# Patient Record
Sex: FEMALE | Race: BLACK OR AFRICAN AMERICAN | NOT HISPANIC OR LATINO | Employment: OTHER | URBAN - METROPOLITAN AREA
[De-identification: names, ages, dates, MRNs, and addresses within clinical notes are randomized per-mention and may not be internally consistent; named-entity substitution may affect disease eponyms.]

---

## 2018-10-08 ENCOUNTER — TELEPHONE (OUTPATIENT)
Dept: CARDIOLOGY | Facility: CLINIC | Age: 63
End: 2018-10-08

## 2018-10-09 ENCOUNTER — TELEPHONE (OUTPATIENT)
Dept: CARDIOLOGY | Facility: CLINIC | Age: 63
End: 2018-10-09

## 2018-10-09 NOTE — TELEPHONE ENCOUNTER
Spoke with patient reference to referral jennifer with Dr Redding. Made appointment for 01/17/19 with Dr Redding. No other issues were discussed.

## 2018-10-18 ENCOUNTER — TELEPHONE (OUTPATIENT)
Dept: ORTHOPEDICS | Facility: CLINIC | Age: 63
End: 2018-10-18

## 2018-10-18 DIAGNOSIS — M25.552 PAIN OF BOTH HIP JOINTS: Primary | ICD-10-CM

## 2018-10-18 DIAGNOSIS — M25.551 PAIN OF BOTH HIP JOINTS: Primary | ICD-10-CM

## 2018-10-18 NOTE — TELEPHONE ENCOUNTER
Referral discussed with MD due to concerns January may be to long to wait. Per MD patient needs to be fit it sooner. Appointment made for 10/25 @10.15. Advised patient to come for about 9 to complete imaging prior. Asked patient if she could obtain  The MRI disk too. Patient agreeable and no other issues were discussed.

## 2018-10-25 ENCOUNTER — INITIAL CONSULT (OUTPATIENT)
Dept: ORTHOPEDICS | Facility: CLINIC | Age: 63
End: 2018-10-25
Payer: MEDICAID

## 2018-10-25 VITALS
HEIGHT: 62 IN | HEART RATE: 71 BPM | SYSTOLIC BLOOD PRESSURE: 123 MMHG | DIASTOLIC BLOOD PRESSURE: 70 MMHG | BODY MASS INDEX: 36.99 KG/M2 | WEIGHT: 201 LBS

## 2018-10-25 DIAGNOSIS — M87.051 AVASCULAR NECROSIS OF RIGHT FEMUR: Primary | ICD-10-CM

## 2018-10-25 PROCEDURE — 99204 OFFICE O/P NEW MOD 45 MIN: CPT | Mod: S$PBB,,, | Performed by: ORTHOPAEDIC SURGERY

## 2018-10-25 PROCEDURE — 99214 OFFICE O/P EST MOD 30 MIN: CPT | Mod: PBBFAC,PN | Performed by: ORTHOPAEDIC SURGERY

## 2018-10-25 PROCEDURE — 99999 PR PBB SHADOW E&M-EST. PATIENT-LVL IV: CPT | Mod: PBBFAC,,, | Performed by: ORTHOPAEDIC SURGERY

## 2018-10-25 RX ORDER — LEVOTHYROXINE SODIUM 112 UG/1
112 TABLET ORAL DAILY
Refills: 0 | COMMUNITY
Start: 2018-08-28 | End: 2021-10-26 | Stop reason: SDUPTHER

## 2018-10-25 NOTE — LETTER
October 25, 2018      Merna Lofton, JOSEPH  [address] [address]           [facility]  Orthopedics  [address], [facility] [address]  [facility] [address]  Phone: [phone]  Fax: [phone]          Patient: Carolee Bartlett   MR Number: [id number]   YOB: 1955   Date of Visit: 10/25/2018       Dear Merna Lofton:    Thank you for referring Carolee Bartlett to me for evaluation. Attached you will find relevant portions of my assessment and plan of care.    If you have questions, please do not hesitate to call me. I look forward to following Carolee Bartlett along with you.    Sincerely,    Olayinka Redding MD    Enclosure  CC:  No Recipients    If you would like to receive this communication electronically, please contact [email or web address] or [phone] to request more information on [employer] access.    For providers and/or their staff who would like to refer a patient to [facility], please contact us through our one-stop-shop provider referral line, [facility], at [phone].    If you feel you have received this communication in error or would no longer like to receive these types of communications, please e-mail [email or web address]

## 2018-10-25 NOTE — PROGRESS NOTES
Subjective:      Patient ID: Carolee Bartlett is a 63 y.o. female.    Chief Complaint: Pain and Other of the Right Hip    Carolee Bartlett is a 63 year old female here with a 1 year history of right hip pain. The patient is a  individual, not currently working. There was not a history of trauma.  The pain is severe The pain is located in the groin.  There is is radiation.  The pain radaites to the knee.  The pain is described as sharp. The patient has not had prior surgery. It is aggravated by sitting, standing, lying, at rest, walking, upon awakening and with moderate activity.  It  is not alleviated by rest. There is not numbness or tingling of the lower extremity.  There is not back pain.  She  has tried medication. They have not helped.  She does have difficulty getting in or out of a car, getting dressed, or going up or down stairs.  The patient does use an assistive device.          Review of Systems   Constitution: Negative for chills and fever.   Cardiovascular: Negative for chest pain and syncope.   Respiratory: Negative for cough and shortness of breath.    Musculoskeletal: Positive for joint pain.   Gastrointestinal: Negative for nausea and vomiting.   Neurological: Negative for brief paralysis and seizures.   Psychiatric/Behavioral: Negative for altered mental status and hallucinations.         Objective:            General    Constitutional: She is oriented to person, place, and time. She appears well-developed and well-nourished.   HENT:   Head: Normocephalic and atraumatic.   Eyes: Conjunctivae are normal.   Neck: Normal range of motion.   Cardiovascular: Intact distal pulses.    Pulmonary/Chest: Effort normal.   Neurological: She is alert and oriented to person, place, and time.   Psychiatric: She has a normal mood and affect. Her behavior is normal. Judgment and thought content normal.     General Musculoskeletal Exam   Gait: antalgic   Pelvic Obliquity: none        Right Hip Exam     Inspection    Scars: absent  Swelling: absent  Bruising: absent  No deformity of hip.  Quadriceps Atrophy:  Negative  Erythema: absent    Range of Motion   Abduction: 30   Adduction: 20   Extension: 0   Flexion: 90   External rotation: 30   Internal rotation: 10     Tests   Pain w/ forced internal rotation (ADILENE): present  Pain w/ forced external rotation (FADIR): present  Stinchfield test: positive  Log Roll: positive    Other   Sensation: normal  Back (L-Spine & T-Spine) / Neck (C-Spine) Exam     Back (L-Spine & T-Spine) Range of Motion   The patient has abnormal back ROM.      Muscle Strength   Right Lower Extremity   Hip Abduction: 5/5   Hip Adduction: 5/5   Hip Flexion: 5/5   Ankle Dorsiflexion:  5/5     Vascular Exam     Right Pulses  Dorsalis Pedis:      2+  Posterior Tibial:      2+        Capillary Refill  Right Hand: normal capillary refill    Radiographs of the Right hip demonstrate no fracture/dislocation. There is collapse of the femoral head consistent with AVN.               Assessment:       Encounter Diagnosis   Name Primary?    Avascular necrosis of right femur Yes          Plan:       Carolee was seen today for pain and other.    Diagnoses and all orders for this visit:    Avascular necrosis of right femur  -     Case Request Operating Room: ARTHROPLASTY, HIP, [place]iel, peg board, first assist      64yo F with RIGHT hip AVN    Will proceed to OR for RIGHT BILLY  Consent  Medical clearance

## 2018-11-19 ENCOUNTER — TELEPHONE (OUTPATIENT)
Dept: CARDIOLOGY | Facility: CLINIC | Age: 63
End: 2018-11-19

## 2018-11-19 NOTE — TELEPHONE ENCOUNTER
Placed return call to patient to answer question about surgery. Patient wanted to know what time is her surgery for. Patient was advised she will be getting a call from the surgery department to inform on the time and give Pre - Op  Instructions for surgery. No further issues were discussed.

## 2018-11-19 NOTE — TELEPHONE ENCOUNTER
----- Message from Jil Collins sent at 11/19/2018 11:28 AM CST -----  Contact: self   Patient has some questions regarding her upcoming surgery. Please call back at [phone] (home)

## 2018-11-26 PROBLEM — Z96.649 S/P TOTAL HIP ARTHROPLASTY: Status: ACTIVE | Noted: 2018-11-26

## 2018-11-26 PROBLEM — E03.9 HYPOTHYROIDISM: Status: ACTIVE | Noted: 2018-11-26

## 2018-11-26 PROBLEM — M87.051 AVASCULAR NECROSIS OF RIGHT FEMUR: Status: ACTIVE | Noted: 2018-11-26

## 2018-12-03 ENCOUNTER — TELEPHONE (OUTPATIENT)
Dept: ORTHOPEDICS | Facility: CLINIC | Age: 63
End: 2018-12-03

## 2018-12-03 NOTE — TELEPHONE ENCOUNTER
Spoke with patient regarding her symptoms. Patient stated she is experiencing swelling to both lower extremities. Denies redness, warmth, nausea/vomiting, fever, SOB. Informed patient that swelling is to be expected but she should be observant of the aforementioned symptoms and call if she should notice any changes. Patient indicated understanding of the information provided to her. No further issues discussed.

## 2018-12-03 NOTE — TELEPHONE ENCOUNTER
----- Message from Camden Lucero sent at 12/3/2018  8:58 AM CST -----  Contact: self   Patient want to inform office she have swelling on lower body need some medical advice please call back at [phone] (home)

## 2018-12-07 ENCOUNTER — TELEPHONE (OUTPATIENT)
Dept: SURGERY | Facility: CLINIC | Age: 63
End: 2018-12-07

## 2018-12-07 NOTE — TELEPHONE ENCOUNTER
Received call from patient inquiring as to which  is responsible is responsible for changing the post surgical dressing.Patient stated there is an appointment with the Primary Care Physician before the appointment with the Postop  physician.Patient Acknowledge the Postop dressings remains clean and intact.Patient was asked was Post-Op dressing change instructions give and discussed upon discharge. Patient acknowledges understanding of Postop discharge dressing change instructions. Patient was advised to follow Postop discharge dressing instructions as meticulously as reasonably possible. Patient was further advised to go the emergency room as expeditiously as possible, upon a problematic concern about the Postop dressings.Patient was further advised upon visitation with the Primary Care Physician,should there be concerns about the status of the Postop surgical dressing, it should be addressed with the Primary Care Physician. Patient acknowledges understanding of instructions, . No further issues were addressed.

## 2018-12-20 ENCOUNTER — OFFICE VISIT (OUTPATIENT)
Dept: ORTHOPEDICS | Facility: CLINIC | Age: 63
End: 2018-12-20
Payer: MEDICAID

## 2018-12-20 VITALS
SYSTOLIC BLOOD PRESSURE: 133 MMHG | DIASTOLIC BLOOD PRESSURE: 70 MMHG | HEART RATE: 98 BPM | WEIGHT: 206.38 LBS | HEIGHT: 62 IN | BODY MASS INDEX: 37.98 KG/M2

## 2018-12-20 DIAGNOSIS — Z51.89 AFTERCARE: Primary | ICD-10-CM

## 2018-12-20 PROCEDURE — 99213 OFFICE O/P EST LOW 20 MIN: CPT | Mod: PBBFAC,PN | Performed by: ORTHOPAEDIC SURGERY

## 2018-12-20 PROCEDURE — 99024 POSTOP FOLLOW-UP VISIT: CPT | Mod: ,,, | Performed by: ORTHOPAEDIC SURGERY

## 2018-12-20 PROCEDURE — 99999 PR PBB SHADOW E&M-EST. PATIENT-LVL III: CPT | Mod: PBBFAC,,, | Performed by: ORTHOPAEDIC SURGERY

## 2018-12-20 RX ORDER — OXYCODONE AND ACETAMINOPHEN 5; 325 MG/1; MG/1
1-2 TABLET ORAL EVERY 6 HOURS PRN
Qty: 91 TABLET | Refills: 0 | Status: SHIPPED | OUTPATIENT
Start: 2018-12-20 | End: 2019-03-14

## 2018-12-20 NOTE — PROGRESS NOTES
Patient presents 2 weeks s/p Right BILLY  Pain controlled on current regimen  No fever/chills  Compliant with restrictions      Sutures intact  Wound c/d/i  No si/sx of infection  NVI distally      2 weeks s/p Right BILLY  Pain rx  PT referral  Activity as tolerated  RTC 4 weeks

## 2019-01-09 ENCOUNTER — TELEPHONE (OUTPATIENT)
Dept: ORTHOPEDICS | Facility: CLINIC | Age: 64
End: 2019-01-09

## 2019-01-09 DIAGNOSIS — M25.551 HIP PAIN, RIGHT: Primary | ICD-10-CM

## 2019-01-09 NOTE — TELEPHONE ENCOUNTER
Patient coming for 6 week post-op appointment. Patient advised that new images would need to be completed prior. Patient verbalized understanding and no other issues were discussed.

## 2019-01-11 ENCOUNTER — APPOINTMENT (OUTPATIENT)
Dept: RADIOLOGY | Facility: HOSPITAL | Age: 64
End: 2019-01-11
Attending: ORTHOPAEDIC SURGERY
Payer: MEDICAID

## 2019-01-11 DIAGNOSIS — M25.551 HIP PAIN, RIGHT: ICD-10-CM

## 2019-01-11 PROCEDURE — 73502 X-RAY EXAM HIP UNI 2-3 VIEWS: CPT | Mod: 26,RT,, | Performed by: RADIOLOGY

## 2019-01-11 PROCEDURE — 73502 XR HIP 2 VIEW RIGHT: ICD-10-PCS | Mod: 26,RT,, | Performed by: RADIOLOGY

## 2019-01-11 PROCEDURE — 73502 X-RAY EXAM HIP UNI 2-3 VIEWS: CPT | Mod: TC,FY,PN,RT

## 2019-01-17 ENCOUNTER — OFFICE VISIT (OUTPATIENT)
Dept: ORTHOPEDICS | Facility: CLINIC | Age: 64
End: 2019-01-17
Payer: MEDICAID

## 2019-01-17 VITALS
BODY MASS INDEX: 36.78 KG/M2 | HEIGHT: 63 IN | DIASTOLIC BLOOD PRESSURE: 77 MMHG | SYSTOLIC BLOOD PRESSURE: 152 MMHG | HEART RATE: 105 BPM | WEIGHT: 207.56 LBS

## 2019-01-17 DIAGNOSIS — Z96.641 STATUS POST TOTAL REPLACEMENT OF RIGHT HIP: Primary | ICD-10-CM

## 2019-01-17 PROCEDURE — 99024 PR POST-OP FOLLOW-UP VISIT: ICD-10-PCS | Mod: ,,, | Performed by: ORTHOPAEDIC SURGERY

## 2019-01-17 PROCEDURE — 99213 OFFICE O/P EST LOW 20 MIN: CPT | Mod: PBBFAC,PN | Performed by: ORTHOPAEDIC SURGERY

## 2019-01-17 PROCEDURE — 99999 PR PBB SHADOW E&M-EST. PATIENT-LVL III: CPT | Mod: PBBFAC,,, | Performed by: ORTHOPAEDIC SURGERY

## 2019-01-17 PROCEDURE — 99999 PR PBB SHADOW E&M-EST. PATIENT-LVL III: ICD-10-PCS | Mod: PBBFAC,,, | Performed by: ORTHOPAEDIC SURGERY

## 2019-01-17 PROCEDURE — 99024 POSTOP FOLLOW-UP VISIT: CPT | Mod: ,,, | Performed by: ORTHOPAEDIC SURGERY

## 2019-01-17 RX ORDER — HYDROCODONE BITARTRATE AND ACETAMINOPHEN 5; 325 MG/1; MG/1
1-2 TABLET ORAL EVERY 8 HOURS PRN
Qty: 71 TABLET | Refills: 0 | Status: SHIPPED | OUTPATIENT
Start: 2019-01-17 | End: 2021-10-26

## 2019-01-17 RX ORDER — HYDROCODONE BITARTRATE AND ACETAMINOPHEN 5; 325 MG/1; MG/1
1-2 TABLET ORAL EVERY 8 HOURS PRN
Qty: 71 TABLET | Refills: 0 | Status: SHIPPED | OUTPATIENT
Start: 2019-01-17 | End: 2019-01-17 | Stop reason: CLARIF

## 2019-01-17 NOTE — PROGRESS NOTES
Patient presents 6 weeks s/p Right BILLY  Pain controlled on current regimen  No fever/chills  Compliant with physical therapy    Wound c/d/i  No si/sx of infection  NVI distally    Radiographs of the RIGHT hip demonstrate well-positioned prosthesis with no signs of loosening.     6 weeks s/p Right BILLY  Pain rx  PT until surgery  Activity as tolerated  Posterior hip precautions  RTC 6 weeks

## 2019-01-23 ENCOUNTER — TELEPHONE (OUTPATIENT)
Dept: ORTHOPEDICS | Facility: CLINIC | Age: 64
End: 2019-01-23

## 2019-01-23 NOTE — TELEPHONE ENCOUNTER
----- Message from Carolee Patti sent at 1/23/2019  9:41 AM CST -----  Contact: Patient  Type: Needs Medical Advice    Who Called:  Carolee, patient  Symptoms (please be specific):  N?A  How long has patient had these symptoms:  N/A  Pharmacy name and phone #:  N/A  Best Call Back Number: [phone]  Additional Information: Calling because she wants her physical therapy orders transferred to [facility]. Please advise. Thanks.

## 2019-01-23 NOTE — TELEPHONE ENCOUNTER
----- Message from Celeste Najera sent at 1/23/2019  9:39 AM CST -----  Good Morning can you all  have the Dr to place an internal order place for PT for this pt  Thanks

## 2019-02-28 ENCOUNTER — OFFICE VISIT (OUTPATIENT)
Dept: ORTHOPEDICS | Facility: CLINIC | Age: 64
End: 2019-02-28
Payer: MEDICAID

## 2019-02-28 VITALS
WEIGHT: 217.5 LBS | HEART RATE: 84 BPM | DIASTOLIC BLOOD PRESSURE: 69 MMHG | BODY MASS INDEX: 38.54 KG/M2 | SYSTOLIC BLOOD PRESSURE: 132 MMHG | HEIGHT: 63 IN

## 2019-02-28 DIAGNOSIS — G89.29 CHRONIC PAIN OF RIGHT KNEE: ICD-10-CM

## 2019-02-28 DIAGNOSIS — M25.561 CHRONIC PAIN OF RIGHT KNEE: ICD-10-CM

## 2019-02-28 DIAGNOSIS — Z96.641 STATUS POST TOTAL REPLACEMENT OF RIGHT HIP: Primary | ICD-10-CM

## 2019-02-28 PROCEDURE — 99999 PR PBB SHADOW E&M-EST. PATIENT-LVL III: ICD-10-PCS | Mod: PBBFAC,,, | Performed by: ORTHOPAEDIC SURGERY

## 2019-02-28 PROCEDURE — 99024 PR POST-OP FOLLOW-UP VISIT: ICD-10-PCS | Mod: ,,, | Performed by: ORTHOPAEDIC SURGERY

## 2019-02-28 PROCEDURE — 99999 PR PBB SHADOW E&M-EST. PATIENT-LVL III: CPT | Mod: PBBFAC,,, | Performed by: ORTHOPAEDIC SURGERY

## 2019-02-28 PROCEDURE — 99213 OFFICE O/P EST LOW 20 MIN: CPT | Mod: PBBFAC,PN | Performed by: ORTHOPAEDIC SURGERY

## 2019-02-28 PROCEDURE — 99024 POSTOP FOLLOW-UP VISIT: CPT | Mod: ,,, | Performed by: ORTHOPAEDIC SURGERY

## 2019-02-28 RX ORDER — MELOXICAM 15 MG/1
15 TABLET ORAL DAILY
Qty: 30 TABLET | Refills: 2 | Status: SHIPPED | OUTPATIENT
Start: 2019-02-28 | End: 2021-10-26

## 2019-03-09 ENCOUNTER — HOSPITAL ENCOUNTER (EMERGENCY)
Facility: HOSPITAL | Age: 64
Discharge: HOME | End: 2019-03-10
Attending: EMERGENCY MEDICINE
Payer: MEDICAID

## 2019-03-09 DIAGNOSIS — M79.89 LEG SWELLING: ICD-10-CM

## 2019-03-09 DIAGNOSIS — Z96.641 S/P HIP REPLACEMENT, RIGHT: ICD-10-CM

## 2019-03-09 DIAGNOSIS — R60.0 LEG EDEMA, RIGHT: ICD-10-CM

## 2019-03-09 DIAGNOSIS — M79.89 RIGHT LEG SWELLING: Primary | ICD-10-CM

## 2019-03-09 DIAGNOSIS — M79.604 RIGHT LEG PAIN: ICD-10-CM

## 2019-03-09 LAB
ALBUMIN SERPL-MCNC: 3.8 G/DL (ref 3.3–5.5)
ALP SERPL-CCNC: 130 U/L (ref 42–141)
BILIRUB SERPL-MCNC: 0.6 MG/DL (ref 0.2–1.6)
BUN SERPL-MCNC: 12 MG/DL (ref 7–22)
CALCIUM SERPL-MCNC: 9.4 MG/DL (ref 8–10.3)
CHLORIDE SERPL-SCNC: 105 MMOL/L (ref 98–108)
CREAT SERPL-MCNC: 0.4 MG/DL (ref 0.6–1.2)
GLUCOSE SERPL-MCNC: 104 MG/DL (ref 73–118)
POC ALT (SGPT): 27 U/L (ref 10–47)
POC AST (SGOT): 24 U/L (ref 11–38)
POC B-TYPE NATRIURETIC PEPTIDE: 11.7 PG/ML (ref 0–100)
POC CARDIAC TROPONIN I: 0 NG/ML
POC D-DI: 416 NG/ML (ref 0–450)
POC TCO2: 26 MMOL/L (ref 18–33)
POTASSIUM BLD-SCNC: 3.6 MMOL/L (ref 3.6–5.1)
PROTEIN, POC: 7.9 G/DL (ref 6.4–8.1)
SAMPLE: NORMAL
SODIUM BLD-SCNC: 144 MMOL/L (ref 128–145)

## 2019-03-09 PROCEDURE — 85379 FIBRIN DEGRADATION QUANT: CPT | Mod: ER

## 2019-03-09 PROCEDURE — 85025 COMPLETE CBC W/AUTO DIFF WBC: CPT | Mod: ER

## 2019-03-09 PROCEDURE — 84484 ASSAY OF TROPONIN QUANT: CPT | Mod: ER

## 2019-03-09 PROCEDURE — 83880 ASSAY OF NATRIURETIC PEPTIDE: CPT | Mod: ER

## 2019-03-09 PROCEDURE — 99284 EMERGENCY DEPT VISIT MOD MDM: CPT | Mod: ER

## 2019-03-09 PROCEDURE — 82803 BLOOD GASES ANY COMBINATION: CPT | Mod: ER

## 2019-03-09 PROCEDURE — 80053 COMPREHEN METABOLIC PANEL: CPT | Mod: ER

## 2019-03-09 PROCEDURE — 96374 THER/PROPH/DIAG INJ IV PUSH: CPT | Mod: ER

## 2019-03-10 VITALS
DIASTOLIC BLOOD PRESSURE: 67 MMHG | SYSTOLIC BLOOD PRESSURE: 144 MMHG | TEMPERATURE: 98 F | HEART RATE: 80 BPM | RESPIRATION RATE: 19 BRPM | OXYGEN SATURATION: 100 % | HEIGHT: 62 IN | BODY MASS INDEX: 36.8 KG/M2 | WEIGHT: 200 LBS

## 2019-03-10 PROCEDURE — S0077 INJECTION, CLINDAMYCIN PHOSP: HCPCS | Mod: ER | Performed by: EMERGENCY MEDICINE

## 2019-03-10 PROCEDURE — 25000003 PHARM REV CODE 250: Mod: ER | Performed by: EMERGENCY MEDICINE

## 2019-03-10 RX ORDER — CLINDAMYCIN PHOSPHATE 150 MG/ML
600 INJECTION, SOLUTION INTRAVENOUS
Status: COMPLETED | OUTPATIENT
Start: 2019-03-10 | End: 2019-03-10

## 2019-03-10 RX ORDER — CLINDAMYCIN HYDROCHLORIDE 150 MG/1
300 CAPSULE ORAL 4 TIMES DAILY
Qty: 80 CAPSULE | Refills: 0 | Status: SHIPPED | OUTPATIENT
Start: 2019-03-10 | End: 2019-03-20

## 2019-03-10 RX ORDER — CLINDAMYCIN PHOSPHATE 150 MG/ML
600 INJECTION, SOLUTION INTRAVENOUS
Status: DISCONTINUED | OUTPATIENT
Start: 2019-03-10 | End: 2019-03-10

## 2019-03-10 RX ADMIN — CLINDAMYCIN PHOSPHATE 600 MG: 150 INJECTION, SOLUTION INTRAVENOUS at 12:03

## 2019-03-10 NOTE — ED PROVIDER NOTES
Encounter Date: 3/9/2019    SCRIBE #1 NOTE: I, Jm Watson, am scribing for, and in the presence of,  Dr. Maria. I have scribed the following portions of the note - Other sections scribed: HPI, ROS, PE, CBC Results.       History     Chief Complaint   Patient presents with    Leg Problem     PT REPORTS RLE SWELLING SINCE HIP SURGERY MONTHS AGO AND LLE SWELLING STARTING TODAY     64 y.o. female, with Lupus, Grave's, and anemia, who presents to the ED with a complaint of pain and swelling of the RLE and LLE from the right foot to the knee with numbness x three months and SOB with walking  x one month.  Her swelling began one week after a left hip surgery at [facility] and stayed for three days and was dc 'd with 325 mg aspirin, nausea, and pain medication.  Her LLE swelling began one week after surgery, gradually improved, and worsened again yesterday.  Pt reports falling two weeks ago.  No CP, blood in stool, bloody emesis, dizziness, lightheadedness, and HA      The history is provided by the patient.     Review of patient's allergies indicates:  No Known Allergies  Past Medical History:   Diagnosis Date    Avascular necrosis     Encounter for blood transfusion     Graves disease     Other abnormal glucose     Pre-Diabetic    Sciatica      Past Surgical History:   Procedure Laterality Date    ARTHROPLASTY, HIP, [place]cock, peg board, first assist Right 11/26/2018    Performed by Olayinka Redding MD at [facility]    HIP ARTHROPLASTY Right 11/26/2018    TUBAL LIGATION       Family History   Problem Relation Age of Onset    Kidney failure Mother     Hypertension Mother         was on list to have kidney, lung, and heart transplant    Stomach cancer Father     Breast cancer Sister     Heart attack Brother      Social History     Tobacco Use    Smoking status: Current Some Day Smoker     Packs/day: 0.25     Types: Cigarettes   Substance Use Topics    Alcohol use: No     Frequency: Never     Drug use: No     Review of Systems   Respiratory: Positive for shortness of breath.    Cardiovascular: Positive for leg swelling. Negative for chest pain.   Gastrointestinal: Negative for blood in stool, nausea and vomiting.   Neurological: Positive for numbness. Negative for dizziness, light-headedness and headaches.   All other systems reviewed and are negative.      Physical Exam     Initial Vitals [03/09/19 2134]   BP Pulse Resp Temp SpO2   (!) 189/86 87 (!) 22 98 °F (36.7 °C) 100 %      MAP       --         Physical Exam    Nursing note and vitals reviewed.  Constitutional: She appears well-developed and well-nourished.   HENT:   Head: Normocephalic and atraumatic.   Eyes: Conjunctivae are normal.   Neck: Normal range of motion and phonation normal. Neck supple.   Cardiovascular: Normal rate, regular rhythm, normal heart sounds and intact distal pulses. Exam reveals no gallop and no friction rub.    No murmur heard.  Pulses:       Radial pulses are 2+ on the right side, and 2+ on the left side.        Dorsalis pedis pulses are 2+ on the right side, and 2+ on the left side.   Pulmonary/Chest: Effort normal and breath sounds normal. No stridor. No respiratory distress. She has no wheezes. She has no rhonchi. She has no rales. She exhibits no tenderness.   Musculoskeletal: Normal range of motion.        Right hip: She exhibits normal range of motion, normal strength, no tenderness, no bony tenderness, no swelling, no crepitus, no deformity and no laceration.        Left hip: She exhibits normal range of motion, normal strength, no tenderness, no bony tenderness, no swelling, no crepitus, no deformity and no laceration.        Right lower leg: She exhibits tenderness, swelling and edema. She exhibits no bony tenderness, no deformity and no laceration.        Left lower leg: She exhibits swelling and edema. She exhibits no tenderness, no bony tenderness, no deformity and no laceration.   1+ pitting edema to the  bilateral lower legs with TTP to the RLE.    Neurological: She is alert and oriented to person, place, and time.   Skin: Skin is warm, dry and intact. Capillary refill takes less than 2 seconds. No rash noted. No erythema.   Psychiatric: She has a normal mood and affect. Her behavior is normal.         ED Course   Procedures  Labs Reviewed   POCT CMP - Abnormal; Notable for the following components:       Result Value    POC Creatinine 0.4 (*)     All other components within normal limits   TROPONIN ISTAT   POCT CBC   POCT CMP   POCT B-TYPE NATRIURETIC PEPTIDE (BNP)   POCT TROPONIN   POCT D DIMER   POCT B-TYPE NATRIURETIC PEPTIDE (BNP)   POCT D DIMER          Imaging Results          US Lower Extremity Veins Bilateral (Final result)  Result time 03/10/19 00:01:11    Final result by Skye Franklin MD (03/10/19 00:01:11)                 Impression:      Limited examination.  No evidence of deep venous thrombosis in either lower extremity.      Electronically signed by: Skye Franklin  Date:    03/10/2019  Time:    00:01             Narrative:    EXAMINATION:  ULTRASOUND LOWER EXTREMITY VEINS BILATERAL    CLINICAL HISTORY:  Other specified soft tissue disorders    TECHNIQUE:  Duplex and color flow Doppler and dynamic compression was performed of the bilateral lower extremity veins was performed.    COMPARISON:  None    FINDINGS:  Examination is suboptimal secondary to body habitus and tissue attenuation.    Right thigh veins: The common femoral, femoral, popliteal, upper greater saphenous, and deep femoral veins are patent and free of thrombus. The veins are normally compressible and have normal phasic flow and augmentation response.    Right calf veins: The visualized calf veins are patent.    Left thigh veins: The common femoral, femoral, popliteal, upper greater saphenous, and deep femoral veins are patent and free of thrombus. The veins are normally compressible and have normal phasic flow and augmentation  response.    Left calf veins: The visualized calf veins are patent.    Miscellaneous: None                                 Medical Decision Making:   Clinical Tests:   Lab Tests: Ordered and Reviewed  The following lab test(s) were unremarkable: CBC       <> Summary of Lab: WBC-7.4  H&H- 11.1, 34.8  MCV-77.0  RDW%-15.2  PLT-290  Radiological Study: Ordered and Reviewed  ED Management:  Since ultrasound negative, treatment empirically for cellulitis of RLE.     Labs Reviewed  Admission on 03/09/2019, Discharged on 03/10/2019   Component Date Value Ref Range Status    Albumin, POC 03/09/2019 3.8  3.3 - 5.5 g/dL Final    Alkaline Phosphatase, POC 03/09/2019 130  42 - 141 U/L Final    ALT (SGPT), POC 03/09/2019 27  10 - 47 U/L Final    AST (SGOT), POC 03/09/2019 24  11 - 38 U/L Final    POC BUN 03/09/2019 12  7 - 22 mg/dL Final    Calcium, POC 03/09/2019 9.4  8.0 - 10.3 mg/dL Final    POC Chloride 03/09/2019 105  98 - 108 mmol/L Final    POC Creatinine 03/09/2019 0.4* 0.6 - 1.2 mg/dL Final    POC Glucose 03/09/2019 104  73 - 118 mg/dL Final    POC Potassium 03/09/2019 3.6  3.6 - 5.1 mmol/L Final    POC Sodium 03/09/2019 144  128 - 145 mmol/L Final    Bilirubin 03/09/2019 0.6  0.2 - 1.6 mg/dL Final    POC TCO2 03/09/2019 26  18 - 33 mmol/L Final    Protein 03/09/2019 7.9  6.4 - 8.1 g/dL Final    POC Cardiac Troponin I 03/09/2019 0.00  <0.09 ng/mL Final    Sample 03/09/2019 UNK   Final    POC B-Type Natriuretic Peptide 03/09/2019 11.7  0.0 - 100.0 pg/mL Final    POC D-DI 03/09/2019 416  0.0 - 450.0 ng/mL Final        Imaging Reviewed    Imaging Results          US Lower Extremity Veins Bilateral (Final result)  Result time 03/10/19 00:01:11    Final result by Skye Franklin MD (03/10/19 00:01:11)                 Impression:      Limited examination.  No evidence of deep venous thrombosis in either lower extremity.      Electronically signed by: Skye Franklin  Date:    03/10/2019  Time:    00:01              Narrative:    EXAMINATION:  ULTRASOUND LOWER EXTREMITY VEINS BILATERAL    CLINICAL HISTORY:  Other specified soft tissue disorders    TECHNIQUE:  Duplex and color flow Doppler and dynamic compression was performed of the bilateral lower extremity veins was performed.    COMPARISON:  None    FINDINGS:  Examination is suboptimal secondary to body habitus and tissue attenuation.    Right thigh veins: The common femoral, femoral, popliteal, upper greater saphenous, and deep femoral veins are patent and free of thrombus. The veins are normally compressible and have normal phasic flow and augmentation response.    Right calf veins: The visualized calf veins are patent.    Left thigh veins: The common femoral, femoral, popliteal, upper greater saphenous, and deep femoral veins are patent and free of thrombus. The veins are normally compressible and have normal phasic flow and augmentation response.    Left calf veins: The visualized calf veins are patent.    Miscellaneous: None                                Medications given in ED    Medications   clindamycin injection 600 mg (600 mg Intravenous Given 3/10/19 0057)       This document was produced by a scribe under my direction and in my presence. I agree with the content of the note and have made any necessary edits.     Piedad Maria MD         Note was created using voice recognition software. Note may have occasional typographical errors that may not have been identified and edited despite good rudi initial review prior to signing.             Scribe Attestation:   Scribe #1: I performed the above scribed service and the documentation accurately describes the services I performed. I attest to the accuracy of the note.      Discharge Medications     Discharge Medication List as of 3/10/2019  1:27 AM      START taking these medications    Details   clindamycin (CLEOCIN) 150 MG capsule Take 2 capsules (300 mg total) by mouth 4 (four) times daily. for 10 days,  Starting Sun 3/10/2019, Until Wed 3/20/2019, Print         CONTINUE these medications which have NOT CHANGED    Details   HYDROcodone-acetaminophen (NORCO) 5-325 mg per tablet Take 1-2 tablets by mouth every 8 (eight) hours as needed for Pain., Starting Thu 1/17/2019, Print      levothyroxine (SYNTHROID) 112 MCG tablet Take 112 mcg by mouth once daily., Starting Tue 8/28/2018, Historical Med      meloxicam (MOBIC) 15 MG tablet Take 1 tablet (15 mg total) by mouth once daily. Do not take with any other NSAIDs  Take with a meal, Starting Thu 2/28/2019, Print      aspirin (ECOTRIN) 325 MG EC tablet Take 1 tablet (325 mg total) by mouth 2 (two) times daily., Starting Tue 11/27/2018, Until Thu 12/27/2018, Print      !! oxyCODONE-acetaminophen (PERCOCET) 5-325 mg per tablet Take 1-2 tablets by mouth every 6 (six) hours as needed for Pain., Starting Tue 11/27/2018, Print      !! oxyCODONE-acetaminophen (PERCOCET) 5-325 mg per tablet Take 1-2 tablets by mouth every 6 (six) hours as needed for Pain., Starting Thu 12/20/2018, Print      promethazine (PHENERGAN) 25 MG tablet Take 1 tablet (25 mg total) by mouth every 12 (twelve) hours as needed for Nausea., Starting Tue 11/27/2018, Print       !! - Potential duplicate medications found. Please discuss with provider.                Patient discharged to home in stable condition with instructions to:   1. Please take all meds as prescribed.  2. Follow-up with your primary care doctor   3. Return precautions discussed and patient and/or family/caretaker understands to return to the emergency room for any concerns including worsening of your current symptoms, fever, chills, night sweats, worsening pain, chest pain, shortness of breath, nausea, vomiting, diarrhea, bleeding, headache, difficulty talking, visual disturbances, weakness, numbness or any other acute concerns             Clinical Impression:     1. Right leg swelling    2. Leg swelling    3. Right leg pain    4. Leg  edema, right    5. S/P hip replacement, right           Disposition:   Disposition: Discharged  Condition: Stable                        Piedad Maria MD  03/19/19 5927

## 2019-03-11 ENCOUNTER — TELEPHONE (OUTPATIENT)
Dept: SURGERY | Facility: CLINIC | Age: 64
End: 2019-03-11

## 2019-03-11 ENCOUNTER — TELEPHONE (OUTPATIENT)
Dept: ORTHOPEDICS | Facility: CLINIC | Age: 64
End: 2019-03-11

## 2019-03-11 DIAGNOSIS — M25.551 PAIN OF RIGHT HIP JOINT: Primary | ICD-10-CM

## 2019-03-11 NOTE — TELEPHONE ENCOUNTER
----- Message from Yanni Arellano sent at 3/11/2019 10:23 AM CDT -----  Contact: sofiya [phone]  Patient called she went to the ER this weekend she had a US done on her Right Leg the leg she had surgery on her big toe is cold to touch and tingling and feels numb. She was told by the ER she had Colitis they gave her some antibiotics Clindamycin 500 mg takes 2 q 4 hrs . She is asking if she can still continue her Therapy she was given a fax number to say yes she can continue therapy the fax is  [phone] [facility] Patient is asking for a call back as soon as possible to make sure she can keep her appt today for 1 p.m

## 2019-03-13 ENCOUNTER — APPOINTMENT (OUTPATIENT)
Dept: RADIOLOGY | Facility: HOSPITAL | Age: 64
End: 2019-03-13
Attending: ORTHOPAEDIC SURGERY
Payer: MEDICAID

## 2019-03-13 DIAGNOSIS — G89.29 CHRONIC PAIN OF RIGHT KNEE: ICD-10-CM

## 2019-03-13 DIAGNOSIS — M25.551 PAIN OF RIGHT HIP JOINT: ICD-10-CM

## 2019-03-13 DIAGNOSIS — M25.561 CHRONIC PAIN OF RIGHT KNEE: ICD-10-CM

## 2019-03-13 PROCEDURE — 73562 X-RAY EXAM OF KNEE 3: CPT | Mod: 26,RT,, | Performed by: RADIOLOGY

## 2019-03-13 PROCEDURE — 73502 XR HIP 2 VIEW RIGHT: ICD-10-PCS | Mod: 26,RT,, | Performed by: RADIOLOGY

## 2019-03-13 PROCEDURE — 73562 XR KNEE 3 VIEW RIGHT: ICD-10-PCS | Mod: 26,RT,, | Performed by: RADIOLOGY

## 2019-03-13 PROCEDURE — 73562 X-RAY EXAM OF KNEE 3: CPT | Mod: TC,FY,PN,RT

## 2019-03-13 PROCEDURE — 73502 X-RAY EXAM HIP UNI 2-3 VIEWS: CPT | Mod: TC,FY,PN,RT

## 2019-03-13 PROCEDURE — 73502 X-RAY EXAM HIP UNI 2-3 VIEWS: CPT | Mod: 26,RT,, | Performed by: RADIOLOGY

## 2019-03-14 ENCOUNTER — OFFICE VISIT (OUTPATIENT)
Dept: ORTHOPEDICS | Facility: CLINIC | Age: 64
End: 2019-03-14
Payer: MEDICAID

## 2019-03-14 VITALS
WEIGHT: 218.5 LBS | HEIGHT: 62 IN | HEART RATE: 75 BPM | SYSTOLIC BLOOD PRESSURE: 169 MMHG | DIASTOLIC BLOOD PRESSURE: 76 MMHG | BODY MASS INDEX: 40.21 KG/M2

## 2019-03-14 DIAGNOSIS — Z96.641 STATUS POST TOTAL REPLACEMENT OF RIGHT HIP: Primary | ICD-10-CM

## 2019-03-14 PROCEDURE — 99999 PR PBB SHADOW E&M-EST. PATIENT-LVL III: CPT | Mod: PBBFAC,,, | Performed by: ORTHOPAEDIC SURGERY

## 2019-03-14 PROCEDURE — 99999 PR PBB SHADOW E&M-EST. PATIENT-LVL III: ICD-10-PCS | Mod: PBBFAC,,, | Performed by: ORTHOPAEDIC SURGERY

## 2019-03-14 PROCEDURE — 99214 PR OFFICE/OUTPT VISIT, EST, LEVL IV, 30-39 MIN: ICD-10-PCS | Mod: S$PBB,,, | Performed by: ORTHOPAEDIC SURGERY

## 2019-03-14 PROCEDURE — 99214 OFFICE O/P EST MOD 30 MIN: CPT | Mod: S$PBB,,, | Performed by: ORTHOPAEDIC SURGERY

## 2019-03-14 PROCEDURE — 99213 OFFICE O/P EST LOW 20 MIN: CPT | Mod: PBBFAC,PN | Performed by: ORTHOPAEDIC SURGERY

## 2019-03-15 ENCOUNTER — TELEPHONE (OUTPATIENT)
Dept: ORTHOPEDICS | Facility: CLINIC | Age: 64
End: 2019-03-15

## 2019-03-15 NOTE — TELEPHONE ENCOUNTER
----- Message from Hanna Winters sent at 3/14/2019  4:03 PM CDT -----  Type: Needs Medical Advice    Who Called:  Patient  Best Call Back Number: [phone]  Additional Information: Wants to know if or when doctor wants her to return to physical therapy. Please call to advise. If so, office needs to send an order.

## 2019-06-06 NOTE — PROGRESS NOTES
"Orthopaedic Surgery History and Physical     History of present illness:   Carolee Bartlett is a 64 y.o. female who presents 6 months s/p RIGHT BILLY    Allergies:   Review of patient's allergies indicates:  No Known Allergies    Past medical history:   Past Medical History:   Diagnosis Date    Avascular necrosis     Encounter for blood transfusion     Graves disease     Other abnormal glucose     Pre-Diabetic    Sciatica        Past surgical history:  Past Surgical History:   Procedure Laterality Date    ARTHROPLASTY, HIP, [place], peg board, first assist Right 11/26/2018    Performed by Olayinka Redding MD at [facility] OR    HIP ARTHROPLASTY Right 11/26/2018    TUBAL LIGATION         Social history:   Reviewed per EPIC history for tobacco or alcohol use     Medications:    Current Outpatient Medications:     HYDROcodone-acetaminophen (NORCO) 5-325 mg per tablet, Take 1-2 tablets by mouth every 8 (eight) hours as needed for Pain., Disp: 71 tablet, Rfl: 0    levothyroxine (SYNTHROID) 112 MCG tablet, Take 112 mcg by mouth once daily., Disp: , Rfl: 0    meloxicam (MOBIC) 15 MG tablet, Take 1 tablet (15 mg total) by mouth once daily. Do not take with any other NSAIDs Take with a meal, Disp: 30 tablet, Rfl: 2    Review of systems:  Denies chest pain, palpitations, shortness of breath.   Denies excessive thirst, urination or heat or cold intolerance.   Denies nausea, vomiting, melena or hematochezia.    Denies fever, chills, night sweats, weight loss.    Denies dysuria or hematuria.   Denies history of anxiety or depression.   Denies any skin abnormalities or rash.   Denies upper or lower extremity paresthesias or lightheadedness.    Denies cough, shortness of breath or hemoptysis.     Physical Exam:   Vitals:    03/14/19 1339   BP: (!) 169/76   Pulse: 75   Weight: 99.1 kg (218 lb 7.6 oz)   Height: 5' 2" (1.575 m)     Awake/alert/oriented x3, No acute distress, Afebrile, Vital signs stable  Normocephalic, " Atraumatic  Heart is beating at normal rate  Good inspiratory effort with unlaboured breathing  Abdomen soft/nondistended/nontender    Right lower extremity  Motor intact L2-S1  Sensation intact L2-S2  2+ popliteal/dorsalis pedis/posterior tibial pulses  <2s CR refill to digits        Assessment:   64 y.o. female 6 months s/p RIGHT BILLY    Plan:   Activity as tolerated  RTC prn    Olayinka Redding MD  [facility]

## 2019-06-20 ENCOUNTER — OFFICE VISIT (OUTPATIENT)
Dept: ORTHOPEDICS | Facility: CLINIC | Age: 64
End: 2019-06-20
Payer: MEDICAID

## 2019-06-20 VITALS
HEART RATE: 74 BPM | DIASTOLIC BLOOD PRESSURE: 61 MMHG | SYSTOLIC BLOOD PRESSURE: 118 MMHG | BODY MASS INDEX: 40.26 KG/M2 | WEIGHT: 220.13 LBS

## 2019-06-20 DIAGNOSIS — Z96.641 STATUS POST TOTAL REPLACEMENT OF RIGHT HIP: Primary | ICD-10-CM

## 2019-06-20 PROCEDURE — 99214 OFFICE O/P EST MOD 30 MIN: CPT | Mod: S$PBB,,, | Performed by: ORTHOPAEDIC SURGERY

## 2019-06-20 PROCEDURE — 99214 PR OFFICE/OUTPT VISIT, EST, LEVL IV, 30-39 MIN: ICD-10-PCS | Mod: S$PBB,,, | Performed by: ORTHOPAEDIC SURGERY

## 2019-06-20 PROCEDURE — 99999 PR PBB SHADOW E&M-EST. PATIENT-LVL III: ICD-10-PCS | Mod: PBBFAC,,, | Performed by: ORTHOPAEDIC SURGERY

## 2019-06-20 PROCEDURE — 99213 OFFICE O/P EST LOW 20 MIN: CPT | Mod: PBBFAC,PN | Performed by: ORTHOPAEDIC SURGERY

## 2019-06-20 PROCEDURE — 99999 PR PBB SHADOW E&M-EST. PATIENT-LVL III: CPT | Mod: PBBFAC,,, | Performed by: ORTHOPAEDIC SURGERY

## 2019-06-20 NOTE — PROGRESS NOTES
Orthopaedic Surgery History and Physical     History of present illness:   Carolee Bartlett is a 64 y.o. female who presents 6 months s/p RIGHT BILLY    Allergies:   Review of patient's allergies indicates:  No Known Allergies    Past medical history:   Past Medical History:   Diagnosis Date    Avascular necrosis     Encounter for blood transfusion     Graves disease     Other abnormal glucose     Pre-Diabetic    Sciatica        Past surgical history:  Past Surgical History:   Procedure Laterality Date    ARTHROPLASTY, HIP, [place], peg board, first assist Right 11/26/2018    Performed by Olayinka Redding MD at [facility] OR    HIP ARTHROPLASTY Right 11/26/2018    TUBAL LIGATION         Social history:   Reviewed per EPIC history for tobacco or alcohol use     Medications:    Current Outpatient Medications:     HYDROcodone-acetaminophen (NORCO) 5-325 mg per tablet, Take 1-2 tablets by mouth every 8 (eight) hours as needed for Pain., Disp: 71 tablet, Rfl: 0    levothyroxine (SYNTHROID) 112 MCG tablet, Take 112 mcg by mouth once daily., Disp: , Rfl: 0    meloxicam (MOBIC) 15 MG tablet, Take 1 tablet (15 mg total) by mouth once daily. Do not take with any other NSAIDs Take with a meal, Disp: 30 tablet, Rfl: 2    Review of systems:  Denies chest pain, palpitations, shortness of breath.   Denies excessive thirst, urination or heat or cold intolerance.   Denies nausea, vomiting, melena or hematochezia.    Denies fever, chills, night sweats, weight loss.    Denies dysuria or hematuria.   Denies history of anxiety or depression.   Denies any skin abnormalities or rash.   Denies upper or lower extremity paresthesias or lightheadedness.    Denies cough, shortness of breath or hemoptysis.     Physical Exam:   Vitals:    06/20/19 0923   BP: 118/61   Pulse: 74   Weight: 99.9 kg (220 lb 2.1 oz)     Awake/alert/oriented x3, No acute distress, Afebrile, Vital signs stable  Normocephalic, Atraumatic  Heart is beating at  normal rate  Good inspiratory effort with unlaboured breathing  Abdomen soft/nondistended/nontender    Right lower extremity  Motor intact L2-S1  Sensation intact L2-S2  2+ popliteal/dorsalis pedis/posterior tibial pulses  <2s CR refill to digits        Assessment:   64 y.o. female s/p RIGHT BILLY    Plan:   Activity as tolerated  RTC prn    Olayinka Redding MD  [facility]

## 2020-08-05 ENCOUNTER — TELEPHONE (OUTPATIENT)
Dept: ORTHOPEDICS | Facility: CLINIC | Age: 65
End: 2020-08-05

## 2020-08-05 NOTE — TELEPHONE ENCOUNTER
Spoke with pt. Advised pt that Dr Redding no longer practices here in [place]. Pt wanted to know where he does practice. Office name, number and address given. Pt verbalized understanding.

## 2020-08-05 NOTE — TELEPHONE ENCOUNTER
----- Message from Ruth Hope sent at 8/5/2020  1:50 PM CDT -----  Regarding: Dr Redding  Contact: pt  Pt would like to be called back regarding  Dr Redding    She would like very much to find out where he went to practice.   He did her surgery  Is familiar with her case       Please call and advise if you do or don't know he's whereabouts    Pt can be reached at [phone]  Thanks

## 2021-10-12 ENCOUNTER — TELEPHONE (OUTPATIENT)
Dept: FAMILY MEDICINE | Facility: CLINIC | Age: 66
End: 2021-10-12

## 2021-10-26 ENCOUNTER — OFFICE VISIT (OUTPATIENT)
Dept: FAMILY MEDICINE | Facility: CLINIC | Age: 66
End: 2021-10-26
Payer: MEDICARE

## 2021-10-26 ENCOUNTER — IMMUNIZATION (OUTPATIENT)
Dept: OBSTETRICS AND GYNECOLOGY | Facility: CLINIC | Age: 66
End: 2021-10-26
Payer: MEDICARE

## 2021-10-26 VITALS
BODY MASS INDEX: 39.8 KG/M2 | OXYGEN SATURATION: 98 % | DIASTOLIC BLOOD PRESSURE: 62 MMHG | TEMPERATURE: 99 F | HEIGHT: 63 IN | HEART RATE: 70 BPM | WEIGHT: 224.63 LBS | SYSTOLIC BLOOD PRESSURE: 138 MMHG

## 2021-10-26 DIAGNOSIS — E03.9 HYPOTHYROIDISM, UNSPECIFIED TYPE: Primary | ICD-10-CM

## 2021-10-26 DIAGNOSIS — Z12.31 ENCOUNTER FOR SCREENING MAMMOGRAM FOR BREAST CANCER: ICD-10-CM

## 2021-10-26 DIAGNOSIS — Z23 NEED FOR VACCINATION: Primary | ICD-10-CM

## 2021-10-26 DIAGNOSIS — E78.5 HYPERLIPIDEMIA, UNSPECIFIED HYPERLIPIDEMIA TYPE: ICD-10-CM

## 2021-10-26 DIAGNOSIS — H10.89 OTHER CONJUNCTIVITIS OF RIGHT EYE: ICD-10-CM

## 2021-10-26 PROCEDURE — 3075F PR MOST RECENT SYSTOLIC BLOOD PRESS GE 130-139MM HG: ICD-10-PCS | Mod: CPTII,S$GLB,, | Performed by: FAMILY MEDICINE

## 2021-10-26 PROCEDURE — 1101F PR PT FALLS ASSESS DOC 0-1 FALLS W/OUT INJ PAST YR: ICD-10-PCS | Mod: CPTII,S$GLB,, | Performed by: FAMILY MEDICINE

## 2021-10-26 PROCEDURE — 3078F DIAST BP <80 MM HG: CPT | Mod: CPTII,S$GLB,, | Performed by: FAMILY MEDICINE

## 2021-10-26 PROCEDURE — 0003A COVID-19, MRNA, LNP-S, PF, 30 MCG/0.3 ML DOSE VACCINE: CPT | Mod: PBBFAC | Performed by: FAMILY MEDICINE

## 2021-10-26 PROCEDURE — 3008F PR BODY MASS INDEX (BMI) DOCUMENTED: ICD-10-PCS | Mod: CPTII,S$GLB,, | Performed by: FAMILY MEDICINE

## 2021-10-26 PROCEDURE — 3008F BODY MASS INDEX DOCD: CPT | Mod: CPTII,S$GLB,, | Performed by: FAMILY MEDICINE

## 2021-10-26 PROCEDURE — 99999 PR PBB SHADOW E&M-EST. PATIENT-LVL III: ICD-10-PCS | Mod: PBBFAC,,, | Performed by: FAMILY MEDICINE

## 2021-10-26 PROCEDURE — 1159F PR MEDICATION LIST DOCUMENTED IN MEDICAL RECORD: ICD-10-PCS | Mod: CPTII,S$GLB,, | Performed by: FAMILY MEDICINE

## 2021-10-26 PROCEDURE — 3078F PR MOST RECENT DIASTOLIC BLOOD PRESSURE < 80 MM HG: ICD-10-PCS | Mod: CPTII,S$GLB,, | Performed by: FAMILY MEDICINE

## 2021-10-26 PROCEDURE — 99999 PR PBB SHADOW E&M-EST. PATIENT-LVL III: CPT | Mod: PBBFAC,,, | Performed by: FAMILY MEDICINE

## 2021-10-26 PROCEDURE — 99499 RISK ADDL DX/OHS AUDIT: ICD-10-PCS | Mod: S$GLB,,, | Performed by: FAMILY MEDICINE

## 2021-10-26 PROCEDURE — 3075F SYST BP GE 130 - 139MM HG: CPT | Mod: CPTII,S$GLB,, | Performed by: FAMILY MEDICINE

## 2021-10-26 PROCEDURE — 3288F PR FALLS RISK ASSESSMENT DOCUMENTED: ICD-10-PCS | Mod: CPTII,S$GLB,, | Performed by: FAMILY MEDICINE

## 2021-10-26 PROCEDURE — 99499 UNLISTED E&M SERVICE: CPT | Mod: S$GLB,,, | Performed by: FAMILY MEDICINE

## 2021-10-26 PROCEDURE — 91300 COVID-19, MRNA, LNP-S, PF, 30 MCG/0.3 ML DOSE VACCINE: CPT | Mod: PBBFAC | Performed by: FAMILY MEDICINE

## 2021-10-26 PROCEDURE — 1160F PR REVIEW ALL MEDS BY PRESCRIBER/CLIN PHARMACIST DOCUMENTED: ICD-10-PCS | Mod: CPTII,S$GLB,, | Performed by: FAMILY MEDICINE

## 2021-10-26 PROCEDURE — 3288F FALL RISK ASSESSMENT DOCD: CPT | Mod: CPTII,S$GLB,, | Performed by: FAMILY MEDICINE

## 2021-10-26 PROCEDURE — 1101F PT FALLS ASSESS-DOCD LE1/YR: CPT | Mod: CPTII,S$GLB,, | Performed by: FAMILY MEDICINE

## 2021-10-26 PROCEDURE — 99214 PR OFFICE/OUTPT VISIT, EST, LEVL IV, 30-39 MIN: ICD-10-PCS | Mod: S$GLB,,, | Performed by: FAMILY MEDICINE

## 2021-10-26 PROCEDURE — 1159F MED LIST DOCD IN RCRD: CPT | Mod: CPTII,S$GLB,, | Performed by: FAMILY MEDICINE

## 2021-10-26 PROCEDURE — 1160F RVW MEDS BY RX/DR IN RCRD: CPT | Mod: CPTII,S$GLB,, | Performed by: FAMILY MEDICINE

## 2021-10-26 PROCEDURE — 99214 OFFICE O/P EST MOD 30 MIN: CPT | Mod: S$GLB,,, | Performed by: FAMILY MEDICINE

## 2021-10-26 RX ORDER — LEVOTHYROXINE SODIUM 112 UG/1
112 TABLET ORAL DAILY
Qty: 90 TABLET | Refills: 1 | Status: SHIPPED | OUTPATIENT
Start: 2021-10-26 | End: 2022-01-05

## 2021-10-26 RX ORDER — CIPROFLOXACIN HYDROCHLORIDE 3 MG/ML
1 SOLUTION/ DROPS OPHTHALMIC
Qty: 2.5 ML | Refills: 0 | Status: SHIPPED | OUTPATIENT
Start: 2021-10-26 | End: 2022-03-02

## 2021-10-26 RX ORDER — ATORVASTATIN CALCIUM 10 MG/1
10 TABLET, FILM COATED ORAL DAILY
Qty: 90 TABLET | Refills: 3 | Status: SHIPPED | OUTPATIENT
Start: 2021-10-26 | End: 2022-01-20 | Stop reason: SDUPTHER

## 2021-11-03 ENCOUNTER — HOSPITAL ENCOUNTER (OUTPATIENT)
Dept: RADIOLOGY | Facility: OTHER | Age: 66
Discharge: HOME | End: 2021-11-03
Attending: FAMILY MEDICINE
Payer: MEDICARE

## 2021-11-03 DIAGNOSIS — Z12.31 ENCOUNTER FOR SCREENING MAMMOGRAM FOR BREAST CANCER: ICD-10-CM

## 2021-11-03 PROCEDURE — 77067 SCR MAMMO BI INCL CAD: CPT | Mod: 26,,, | Performed by: RADIOLOGY

## 2021-11-03 PROCEDURE — 77063 BREAST TOMOSYNTHESIS BI: CPT | Mod: 26,,, | Performed by: RADIOLOGY

## 2021-11-03 PROCEDURE — 77067 MAMMO DIGITAL SCREENING BILAT WITH TOMO: ICD-10-PCS | Mod: 26,,, | Performed by: RADIOLOGY

## 2021-11-03 PROCEDURE — 77063 MAMMO DIGITAL SCREENING BILAT WITH TOMO: ICD-10-PCS | Mod: 26,,, | Performed by: RADIOLOGY

## 2021-11-03 PROCEDURE — 77067 SCR MAMMO BI INCL CAD: CPT | Mod: TC

## 2021-11-11 ENCOUNTER — TELEPHONE (OUTPATIENT)
Dept: FAMILY MEDICINE | Facility: CLINIC | Age: 66
End: 2021-11-11
Payer: MEDICARE

## 2021-11-11 DIAGNOSIS — H57.11 EYE PAIN, RIGHT: Primary | ICD-10-CM

## 2021-12-27 ENCOUNTER — LAB VISIT (OUTPATIENT)
Dept: LAB | Facility: HOSPITAL | Age: 66
End: 2021-12-27
Attending: FAMILY MEDICINE
Payer: MEDICARE

## 2021-12-27 DIAGNOSIS — E03.9 HYPOTHYROIDISM, UNSPECIFIED TYPE: ICD-10-CM

## 2021-12-27 LAB
ALBUMIN SERPL BCP-MCNC: 3.9 G/DL (ref 3.5–5.2)
ALP SERPL-CCNC: 149 U/L (ref 55–135)
ALT SERPL W/O P-5'-P-CCNC: 26 U/L (ref 10–44)
ANION GAP SERPL CALC-SCNC: 8 MMOL/L (ref 8–16)
AST SERPL-CCNC: 20 U/L (ref 10–40)
BASOPHILS # BLD AUTO: 0.04 K/UL (ref 0–0.2)
BASOPHILS NFR BLD: 0.5 % (ref 0–1.9)
BILIRUB SERPL-MCNC: 0.4 MG/DL (ref 0.1–1)
BUN SERPL-MCNC: 17 MG/DL (ref 8–23)
CALCIUM SERPL-MCNC: 10.1 MG/DL (ref 8.7–10.5)
CHLORIDE SERPL-SCNC: 103 MMOL/L (ref 95–110)
CHOLEST SERPL-MCNC: 163 MG/DL (ref 120–199)
CHOLEST/HDLC SERPL: 3.4 {RATIO} (ref 2–5)
CO2 SERPL-SCNC: 25 MMOL/L (ref 23–29)
CREAT SERPL-MCNC: 0.8 MG/DL (ref 0.5–1.4)
DIFFERENTIAL METHOD: ABNORMAL
EOSINOPHIL # BLD AUTO: 0.2 K/UL (ref 0–0.5)
EOSINOPHIL NFR BLD: 2.9 % (ref 0–8)
ERYTHROCYTE [DISTWIDTH] IN BLOOD BY AUTOMATED COUNT: 15.2 % (ref 11.5–14.5)
EST. GFR  (AFRICAN AMERICAN): >60 ML/MIN/1.73 M^2
EST. GFR  (NON AFRICAN AMERICAN): >60 ML/MIN/1.73 M^2
GLUCOSE SERPL-MCNC: 132 MG/DL (ref 70–110)
HCT VFR BLD AUTO: 40.4 % (ref 37–48.5)
HDLC SERPL-MCNC: 48 MG/DL (ref 40–75)
HDLC SERPL: 29.4 % (ref 20–50)
HGB BLD-MCNC: 12.1 G/DL (ref 12–16)
IMM GRANULOCYTES # BLD AUTO: 0.02 K/UL (ref 0–0.04)
IMM GRANULOCYTES NFR BLD AUTO: 0.3 % (ref 0–0.5)
LDLC SERPL CALC-MCNC: 97 MG/DL (ref 63–159)
LYMPHOCYTES # BLD AUTO: 2.7 K/UL (ref 1–4.8)
LYMPHOCYTES NFR BLD: 36 % (ref 18–48)
MCH RBC QN AUTO: 24.8 PG (ref 27–31)
MCHC RBC AUTO-ENTMCNC: 30 G/DL (ref 32–36)
MCV RBC AUTO: 83 FL (ref 82–98)
MONOCYTES # BLD AUTO: 0.9 K/UL (ref 0.3–1)
MONOCYTES NFR BLD: 12.2 % (ref 4–15)
NEUTROPHILS # BLD AUTO: 3.7 K/UL (ref 1.8–7.7)
NEUTROPHILS NFR BLD: 48.1 % (ref 38–73)
NONHDLC SERPL-MCNC: 115 MG/DL
NRBC BLD-RTO: 0 /100 WBC
PLATELET # BLD AUTO: 415 K/UL (ref 150–450)
PMV BLD AUTO: 10.6 FL (ref 9.2–12.9)
POTASSIUM SERPL-SCNC: 4.3 MMOL/L (ref 3.5–5.1)
PROT SERPL-MCNC: 7.8 G/DL (ref 6–8.4)
RBC # BLD AUTO: 4.88 M/UL (ref 4–5.4)
SODIUM SERPL-SCNC: 136 MMOL/L (ref 136–145)
T4 FREE SERPL-MCNC: 1.19 NG/DL (ref 0.71–1.51)
TRIGL SERPL-MCNC: 90 MG/DL (ref 30–150)
TSH SERPL DL<=0.005 MIU/L-ACNC: 0.03 UIU/ML (ref 0.4–4)
WBC # BLD AUTO: 7.62 K/UL (ref 3.9–12.7)

## 2021-12-27 PROCEDURE — 85025 COMPLETE CBC W/AUTO DIFF WBC: CPT | Performed by: FAMILY MEDICINE

## 2021-12-27 PROCEDURE — 36415 COLL VENOUS BLD VENIPUNCTURE: CPT | Mod: PO | Performed by: FAMILY MEDICINE

## 2021-12-27 PROCEDURE — 84443 ASSAY THYROID STIM HORMONE: CPT | Performed by: FAMILY MEDICINE

## 2021-12-27 PROCEDURE — 84439 ASSAY OF FREE THYROXINE: CPT | Performed by: FAMILY MEDICINE

## 2021-12-27 PROCEDURE — 80053 COMPREHEN METABOLIC PANEL: CPT | Performed by: FAMILY MEDICINE

## 2021-12-27 PROCEDURE — 80061 LIPID PANEL: CPT | Performed by: FAMILY MEDICINE

## 2022-01-03 ENCOUNTER — TELEPHONE (OUTPATIENT)
Dept: OPTOMETRY | Facility: CLINIC | Age: 67
End: 2022-01-03
Payer: MEDICARE

## 2022-01-03 ENCOUNTER — TELEPHONE (OUTPATIENT)
Dept: OPHTHALMOLOGY | Facility: CLINIC | Age: 67
End: 2022-01-03
Payer: MEDICARE

## 2022-01-03 ENCOUNTER — OFFICE VISIT (OUTPATIENT)
Dept: OPTOMETRY | Facility: CLINIC | Age: 67
End: 2022-01-03
Payer: MEDICARE

## 2022-01-03 DIAGNOSIS — H01.001 BLEPHARITIS OF UPPER EYELIDS OF BOTH EYES, UNSPECIFIED TYPE: ICD-10-CM

## 2022-01-03 DIAGNOSIS — H02.401 PTOSIS OF EYELID, RIGHT: ICD-10-CM

## 2022-01-03 DIAGNOSIS — H04.123 DRY EYE SYNDROME OF BOTH EYES: Primary | ICD-10-CM

## 2022-01-03 DIAGNOSIS — H01.004 BLEPHARITIS OF UPPER EYELIDS OF BOTH EYES, UNSPECIFIED TYPE: ICD-10-CM

## 2022-01-03 PROCEDURE — 3288F PR FALLS RISK ASSESSMENT DOCUMENTED: ICD-10-PCS | Mod: CPTII,S$GLB,, | Performed by: OPTOMETRIST

## 2022-01-03 PROCEDURE — 1160F PR REVIEW ALL MEDS BY PRESCRIBER/CLIN PHARMACIST DOCUMENTED: ICD-10-PCS | Mod: CPTII,S$GLB,, | Performed by: OPTOMETRIST

## 2022-01-03 PROCEDURE — 1160F RVW MEDS BY RX/DR IN RCRD: CPT | Mod: CPTII,S$GLB,, | Performed by: OPTOMETRIST

## 2022-01-03 PROCEDURE — 1101F PT FALLS ASSESS-DOCD LE1/YR: CPT | Mod: CPTII,S$GLB,, | Performed by: OPTOMETRIST

## 2022-01-03 PROCEDURE — 1125F AMNT PAIN NOTED PAIN PRSNT: CPT | Mod: CPTII,S$GLB,, | Performed by: OPTOMETRIST

## 2022-01-03 PROCEDURE — 92004 COMPRE OPH EXAM NEW PT 1/>: CPT | Mod: S$GLB,,, | Performed by: OPTOMETRIST

## 2022-01-03 PROCEDURE — 92004 PR EYE EXAM, NEW PATIENT,COMPREHESV: ICD-10-PCS | Mod: S$GLB,,, | Performed by: OPTOMETRIST

## 2022-01-03 PROCEDURE — 99999 PR PBB SHADOW E&M-EST. PATIENT-LVL II: CPT | Mod: PBBFAC,,, | Performed by: OPTOMETRIST

## 2022-01-03 PROCEDURE — 1159F PR MEDICATION LIST DOCUMENTED IN MEDICAL RECORD: ICD-10-PCS | Mod: CPTII,S$GLB,, | Performed by: OPTOMETRIST

## 2022-01-03 PROCEDURE — 1125F PR PAIN SEVERITY QUANTIFIED, PAIN PRESENT: ICD-10-PCS | Mod: CPTII,S$GLB,, | Performed by: OPTOMETRIST

## 2022-01-03 PROCEDURE — 99999 PR PBB SHADOW E&M-EST. PATIENT-LVL II: ICD-10-PCS | Mod: PBBFAC,,, | Performed by: OPTOMETRIST

## 2022-01-03 PROCEDURE — 1159F MED LIST DOCD IN RCRD: CPT | Mod: CPTII,S$GLB,, | Performed by: OPTOMETRIST

## 2022-01-03 PROCEDURE — 3288F FALL RISK ASSESSMENT DOCD: CPT | Mod: CPTII,S$GLB,, | Performed by: OPTOMETRIST

## 2022-01-03 PROCEDURE — 1101F PR PT FALLS ASSESS DOC 0-1 FALLS W/OUT INJ PAST YR: ICD-10-PCS | Mod: CPTII,S$GLB,, | Performed by: OPTOMETRIST

## 2022-01-03 NOTE — TELEPHONE ENCOUNTER
Spoke to pt to schedule 1 month PRASHANTH follow up + DFE per Dr. Martinez. Pt scheduled 02/21/2022 at 9:30 am (pt requested late A.M. appt time). Confirmed appt date and time with pt.     Msg sent to Dr. Burden's team per Dr. Martinez to schedule appt for ptosis ema LUN[place]ORD.

## 2022-01-03 NOTE — PROGRESS NOTES
HPI     CC: Pt here for new patient urgent visit. Pt states that she went to her   PCP Dr. Portillo 2021 and was diagnosed with pinkeye with FBS,   redness, and eye pain OD>OS. Pt was told to use OTC Systane gtts and   Ciprofloxacin Rx gtts. Pt states that she was told to use Ciprofloaxin   gtts BID OU until gone; pt states that she ran out of gtts a while ago. Pt   states that she has discharge in eyes OU every morning. Pt was diagnosed   with Graves Disease and has low levels of thyroid hormone. Pt states that   she is light sensitive and is concerned about OD not opening as much as   OS.     NAT: 4 years    Which eye: OD>OS  How lon month  Improvement: minimal  (+)redness  (-)itching   (-)pain  (+)irritation  (+)light sensitivity   (+)changes in vision   (+)discharge, AM only   (-)tearing   (-)CL wear   (+)gtt use, systane     PMHx: (+)Graves Disease (+)HTN (+)cholesterol             Last edited by Juju Martinez, OD on 1/3/2022  1:15 PM. (History)            Assessment /Plan     For exam results, see Encounter Report.    Dry eye syndrome of both eyes    Blepharitis of upper eyelids of both eyes, unspecified type    Ptosis of eyelid, right  -     Ambulatory referral/consult to Ophthalmology; Future; Expected date: 2022      1. Educated pt on findings. Recommend ATs QID + clive/gel QHS for added lubrication and comfort.  If symptoms worsen or dont improve, RTC. Monitor 1-2 months.     2. Educated pt on findings. Recommend J&J baby shampoo or Ocusoft lid scrubs along eyelid margin Qday. Monitor.     3. Educated pt on findings. Ptosis OD. Patient symptomatic. Will refer to Dr. Burden for further eval. Message sent to his team in order to get patient scheduled for eval. Monitor.       RTC in 1-2 months for PRASHANTH f/u + annual DFE or sooner if needed.

## 2022-01-03 NOTE — TELEPHONE ENCOUNTER
----- Message from Macie Javed sent at 1/3/2022  1:17 PM CST -----  Regarding: Appointment Request - Ptosis Eval RUL  Good Afternoon,    Patient Ms. Carolee Bartlett saw Dr. Martinez today for an urgent care visit. During her visit today, Dr. Martinez stated that she wanted the patient to be seen by Dr. Burden for a ptosis evaluation RUL. Ms. Bartlett also stated that she has Graves disease and wanted to know if her condition is a contributing factor to her eye trouble. Can you please reach out to Ms. Bartlett to schedule an appointment? Her contact number is [phone].    Thanks,    Macie

## 2022-01-05 ENCOUNTER — PATIENT MESSAGE (OUTPATIENT)
Dept: FAMILY MEDICINE | Facility: CLINIC | Age: 67
End: 2022-01-05
Payer: MEDICARE

## 2022-01-05 DIAGNOSIS — E03.9 HYPOTHYROIDISM, UNSPECIFIED TYPE: Primary | ICD-10-CM

## 2022-01-05 DIAGNOSIS — R73.01 IMPAIRED FASTING GLUCOSE: ICD-10-CM

## 2022-01-05 RX ORDER — LEVOTHYROXINE SODIUM 100 UG/1
100 TABLET ORAL
Qty: 30 TABLET | Refills: 11 | Status: SHIPPED | OUTPATIENT
Start: 2022-01-05 | End: 2022-01-20

## 2022-01-20 ENCOUNTER — OFFICE VISIT (OUTPATIENT)
Dept: INTERNAL MEDICINE | Facility: CLINIC | Age: 67
End: 2022-01-20
Payer: MEDICARE

## 2022-01-20 ENCOUNTER — LAB VISIT (OUTPATIENT)
Dept: LAB | Facility: HOSPITAL | Age: 67
End: 2022-01-20
Attending: NURSE PRACTITIONER
Payer: MEDICARE

## 2022-01-20 VITALS
WEIGHT: 224.13 LBS | HEIGHT: 63 IN | SYSTOLIC BLOOD PRESSURE: 136 MMHG | RESPIRATION RATE: 16 BRPM | DIASTOLIC BLOOD PRESSURE: 60 MMHG | HEART RATE: 80 BPM | OXYGEN SATURATION: 99 % | BODY MASS INDEX: 39.71 KG/M2

## 2022-01-20 DIAGNOSIS — E78.5 HYPERLIPIDEMIA, UNSPECIFIED HYPERLIPIDEMIA TYPE: ICD-10-CM

## 2022-01-20 DIAGNOSIS — R73.01 IFG (IMPAIRED FASTING GLUCOSE): ICD-10-CM

## 2022-01-20 DIAGNOSIS — E03.9 HYPOTHYROIDISM, UNSPECIFIED TYPE: Primary | ICD-10-CM

## 2022-01-20 DIAGNOSIS — I10 PRIMARY HYPERTENSION: ICD-10-CM

## 2022-01-20 DIAGNOSIS — Z76.0 MEDICATION REFILL: ICD-10-CM

## 2022-01-20 LAB
ESTIMATED AVG GLUCOSE: 157 MG/DL (ref 68–131)
HBA1C MFR BLD: 7.1 % (ref 4–5.6)

## 2022-01-20 PROCEDURE — 99499 UNLISTED E&M SERVICE: CPT | Mod: S$GLB,,, | Performed by: NURSE PRACTITIONER

## 2022-01-20 PROCEDURE — 3051F PR MOST RECENT HEMOGLOBIN A1C LEVEL 7.0 - < 8.0%: ICD-10-PCS | Mod: CPTII,S$GLB,, | Performed by: NURSE PRACTITIONER

## 2022-01-20 PROCEDURE — 99214 PR OFFICE/OUTPT VISIT, EST, LEVL IV, 30-39 MIN: ICD-10-PCS | Mod: S$GLB,,, | Performed by: NURSE PRACTITIONER

## 2022-01-20 PROCEDURE — 3075F PR MOST RECENT SYSTOLIC BLOOD PRESS GE 130-139MM HG: ICD-10-PCS | Mod: CPTII,S$GLB,, | Performed by: NURSE PRACTITIONER

## 2022-01-20 PROCEDURE — 3051F HG A1C>EQUAL 7.0%<8.0%: CPT | Mod: CPTII,S$GLB,, | Performed by: NURSE PRACTITIONER

## 2022-01-20 PROCEDURE — 3008F BODY MASS INDEX DOCD: CPT | Mod: CPTII,S$GLB,, | Performed by: NURSE PRACTITIONER

## 2022-01-20 PROCEDURE — 36415 COLL VENOUS BLD VENIPUNCTURE: CPT | Performed by: NURSE PRACTITIONER

## 2022-01-20 PROCEDURE — 83036 HEMOGLOBIN GLYCOSYLATED A1C: CPT | Performed by: NURSE PRACTITIONER

## 2022-01-20 PROCEDURE — 3078F PR MOST RECENT DIASTOLIC BLOOD PRESSURE < 80 MM HG: ICD-10-PCS | Mod: CPTII,S$GLB,, | Performed by: NURSE PRACTITIONER

## 2022-01-20 PROCEDURE — 3078F DIAST BP <80 MM HG: CPT | Mod: CPTII,S$GLB,, | Performed by: NURSE PRACTITIONER

## 2022-01-20 PROCEDURE — 99214 OFFICE O/P EST MOD 30 MIN: CPT | Mod: S$GLB,,, | Performed by: NURSE PRACTITIONER

## 2022-01-20 PROCEDURE — 99999 PR PBB SHADOW E&M-EST. PATIENT-LVL III: CPT | Mod: PBBFAC,,, | Performed by: NURSE PRACTITIONER

## 2022-01-20 PROCEDURE — 99499 RISK ADDL DX/OHS AUDIT: ICD-10-PCS | Mod: S$GLB,,, | Performed by: NURSE PRACTITIONER

## 2022-01-20 PROCEDURE — 3075F SYST BP GE 130 - 139MM HG: CPT | Mod: CPTII,S$GLB,, | Performed by: NURSE PRACTITIONER

## 2022-01-20 PROCEDURE — 3008F PR BODY MASS INDEX (BMI) DOCUMENTED: ICD-10-PCS | Mod: CPTII,S$GLB,, | Performed by: NURSE PRACTITIONER

## 2022-01-20 PROCEDURE — 1159F PR MEDICATION LIST DOCUMENTED IN MEDICAL RECORD: ICD-10-PCS | Mod: CPTII,S$GLB,, | Performed by: NURSE PRACTITIONER

## 2022-01-20 PROCEDURE — 99999 PR PBB SHADOW E&M-EST. PATIENT-LVL III: ICD-10-PCS | Mod: PBBFAC,,, | Performed by: NURSE PRACTITIONER

## 2022-01-20 PROCEDURE — 1159F MED LIST DOCD IN RCRD: CPT | Mod: CPTII,S$GLB,, | Performed by: NURSE PRACTITIONER

## 2022-01-20 RX ORDER — LOSARTAN POTASSIUM AND HYDROCHLOROTHIAZIDE 12.5; 5 MG/1; MG/1
1 TABLET ORAL DAILY
COMMUNITY
Start: 2021-12-15 | End: 2022-01-20 | Stop reason: SDUPTHER

## 2022-01-20 RX ORDER — LOSARTAN POTASSIUM AND HYDROCHLOROTHIAZIDE 12.5; 5 MG/1; MG/1
1 TABLET ORAL DAILY
Qty: 90 TABLET | Refills: 3 | Status: SHIPPED | OUTPATIENT
Start: 2022-01-20 | End: 2023-04-09 | Stop reason: SDUPTHER

## 2022-01-20 RX ORDER — ATORVASTATIN CALCIUM 10 MG/1
10 TABLET, FILM COATED ORAL DAILY
Qty: 90 TABLET | Refills: 3 | Status: SHIPPED | OUTPATIENT
Start: 2022-01-20 | End: 2023-01-22

## 2022-01-20 RX ORDER — LEVOTHYROXINE SODIUM 100 UG/1
100 TABLET ORAL
Qty: 90 TABLET | Refills: 3 | Status: SHIPPED | OUTPATIENT
Start: 2022-01-20 | End: 2023-07-02

## 2022-01-20 NOTE — PROGRESS NOTES
[facility] Note    Chief Complaint      Chief Complaint   Patient presents with    Medication Refill     History of Present Illness      Carolee Bartlett is a 66 y.o. female patient of Dr. Ibarra who is new to me and presents today for medication refills.  Pt has new insurance and has to get a new PCP, she has also been out of her meds.   A1c ordered-pt has been prediabetic in past  meds refilled    Health Maintenance   Topic Date Due    Hepatitis C Screening  Never done    TETANUS VACCINE  Never done    DEXA SCAN  Never done    Mammogram  11/03/2022    Lipid Panel  12/27/2026       Past Medical History:   Diagnosis Date    Avascular necrosis     Encounter for blood transfusion     Graves disease     Other abnormal glucose     Pre-Diabetic    Sciatica        Past Surgical History:   Procedure Laterality Date    HIP ARTHROPLASTY Right 11/26/2018    Procedure: ARTHROPLASTY, HIP, [place], peg board, first assist;  Surgeon: Olayinka Redding MD;  [facility];  Service: Orthopedics;  Laterality: Right;  NOE ORTHO CONFIRMED 11/20/DME FIRST ASSISTANT CONFIRMED 11/21/DME    TUBAL LIGATION         family history includes Breast cancer (age of onset: 33) in her sister; Heart attack (age of onset: 40) in her brother; Hypertension in her mother; Kidney failure in her mother; Stomach cancer in her father.    Social History     Tobacco Use    Smoking status: Current Some Day Smoker     Packs/day: 0.25     Types: Cigarettes    Smokeless tobacco: Never Used   Substance Use Topics    Alcohol use: No    Drug use: No       Review of Systems   Constitutional: Negative for chills, fever and malaise/fatigue.   Eyes: Negative for blurred vision.   Respiratory: Negative for shortness of breath.    Cardiovascular: Negative for chest pain.   Gastrointestinal: Negative for nausea.   Neurological: Negative for dizziness and headaches.        Outpatient Encounter Medications as of 1/20/2022  "  Medication Sig Dispense Refill    [DISCONTINUED] atorvastatin (LIPITOR) 10 MG tablet Take 1 tablet (10 mg total) by mouth once daily. 90 tablet 3    [DISCONTINUED] levothyroxine (SYNTHROID) 100 MCG tablet Take 1 tablet (100 mcg total) by mouth before breakfast. 30 tablet 11    [DISCONTINUED] losartan-hydrochlorothiazide 50-12.5 mg (HYZAAR) 50-12.5 mg per tablet Take 1 tablet by mouth once daily.      atorvastatin (LIPITOR) 10 MG tablet Take 1 tablet (10 mg total) by mouth once daily. 90 tablet 3    ciprofloxacin HCl (CILOXAN) 0.3 % ophthalmic solution Place 1 drop into the left eye every 2 (two) hours. (Patient not taking: No sig reported) 2.5 mL 0    levothyroxine (SYNTHROID) 100 MCG tablet Take 1 tablet (100 mcg total) by mouth before breakfast. 90 tablet 3    losartan-hydrochlorothiazide 50-12.5 mg (HYZAAR) 50-12.5 mg per tablet Take 1 tablet by mouth once daily. 90 tablet 3     No facility-administered encounter medications on file as of 1/20/2022.        Review of patient's allergies indicates:  No Known Allergies    Physical Exam      Vital Signs  Pulse: 80  Resp: 16  SpO2: 99 %  BP: 136/60  BP Location: Right arm  Patient Position: Sitting  Height and Weight  Height: 5' 3" (160 cm)  Weight: 101.6 kg (224 lb 1.6 oz)  BSA (Calculated - sq m): 2.13 sq meters  BMI (Calculated): 39.7  Weight in (lb) to have BMI = 25: 140.8    Physical Exam  Vitals and nursing note reviewed.   Constitutional:       General: She is not in acute distress.     Appearance: Normal appearance. She is not ill-appearing.   HENT:      Head: Normocephalic and atraumatic.   Cardiovascular:      Rate and Rhythm: Normal rate and regular rhythm.      Heart sounds: Normal heart sounds.   Pulmonary:      Effort: Pulmonary effort is normal. No respiratory distress.   Skin:     General: Skin is warm and dry.   Neurological:      Mental Status: She is alert and oriented to person, place, and time.   Psychiatric:         Mood and Affect: Mood " normal.         Behavior: Behavior normal.         Thought Content: Thought content normal.         Judgment: Judgment normal.          Laboratory:  CBC:  Lab Results   Component Value Date    WBC 7.62 12/27/2021    RBC 4.88 12/27/2021    HGB 12.1 12/27/2021    HCT 40.4 12/27/2021     12/27/2021    MCV 83 12/27/2021    MCH 24.8 (L) 12/27/2021    MCHC 30.0 (L) 12/27/2021    MCHC 32.0 11/28/2018    MCHC 32.8 11/26/2018     CMP:  Lab Results   Component Value Date     (H) 12/27/2021    CALCIUM 10.1 12/27/2021    ALBUMIN 3.9 12/27/2021    PROT 7.8 12/27/2021     12/27/2021    K 4.3 12/27/2021    CO2 25 12/27/2021     12/27/2021    BUN 17 12/27/2021    ALKPHOS 149 (H) 12/27/2021    ALT 26 12/27/2021    AST 20 12/27/2021    BILITOT 0.4 12/27/2021     URINALYSIS:  No results found for: COLORU, CLARITYU, SPECGRAV, PHUR, PROTEINUA, GLUCOSEU, BILIRUBINCON, BLOODU, WBCU, RBCU, BACTERIA, MUCUS, NITRITE, LEUKOCYTESUR, UROBILINOGEN, HYALINECASTS   LIPIDS:  Lab Results   Component Value Date    TSH 0.025 (L) 12/27/2021    TSH 1.6 03/15/2008    TSH 32.1 (H) 01/08/2008    HDL 48 12/27/2021    CHOL 163 12/27/2021    TRIG 90 12/27/2021    LDLCALC 97.0 12/27/2021    CHOLHDL 29.4 12/27/2021    NONHDLCHOL 115 12/27/2021    TOTALCHOLEST 3.4 12/27/2021     TSH:  Lab Results   Component Value Date    TSH 0.025 (L) 12/27/2021    TSH 1.6 03/15/2008    TSH 32.1 (H) 01/08/2008     A1C:  Lab Results   Component Value Date    HGBA1C 7.1 (H) 01/20/2022         Assessment/Plan     Carolee Bartlett is a 66 y.o.female with:    Hypothyroidism, unspecified type  -     levothyroxine (SYNTHROID) 100 MCG tablet; Take 1 tablet (100 mcg total) by mouth before breakfast.  Dispense: 90 tablet; Refill: 3    Hyperlipidemia, unspecified hyperlipidemia type  -     atorvastatin (LIPITOR) 10 MG tablet; Take 1 tablet (10 mg total) by mouth once daily.  Dispense: 90 tablet; Refill: 3    Primary hypertension  -      losartan-hydrochlorothiazide 50-12.5 mg (HYZAAR) 50-12.5 mg per tablet; Take 1 tablet by mouth once daily.  Dispense: 90 tablet; Refill: 3    IFG (impaired fasting glucose)  -     Hemoglobin A1C; Future; Expected date: 01/20/2022    Medication refill          I spent 30 minutes on the day of this encounter for preparing for, evaluating, treating, and managing this patient.      -Continue current medications and maintain follow up with specialists.  Return to clinic in 3 months or sooner for any concerns   No follow-ups on file.       Erin Jiminez, NP-C [facility]

## 2022-01-21 ENCOUNTER — PATIENT MESSAGE (OUTPATIENT)
Dept: INTERNAL MEDICINE | Facility: CLINIC | Age: 67
End: 2022-01-21
Payer: MEDICARE

## 2022-01-21 DIAGNOSIS — E11.65 TYPE 2 DIABETES MELLITUS WITH HYPERGLYCEMIA, WITHOUT LONG-TERM CURRENT USE OF INSULIN: Primary | ICD-10-CM

## 2022-01-21 RX ORDER — METFORMIN HYDROCHLORIDE 500 MG/1
TABLET ORAL
Qty: 180 TABLET | Refills: 0 | Status: SHIPPED | OUTPATIENT
Start: 2022-01-21 | End: 2022-04-20 | Stop reason: SDUPTHER

## 2022-01-27 NOTE — PROGRESS NOTES
Patient, Carolee Bartlett (MRN #[id number]), presented with a recorded BMI of 39.7 kg/m^2 and a documented comorbidity(s):  - Hypertension  - Hyperlipidemia  to which the severe obesity is a contributing factor. This is consistent with the definition of severe obesity (BMI 35.0-39.9) with comorbidity (ICD-10 E66.01, Z68.35). The patient's severe obesity was monitored, evaluated, addressed and/or treated. This addendum to the medical record is made on 01/27/2022.

## 2022-02-21 ENCOUNTER — OFFICE VISIT (OUTPATIENT)
Dept: OPTOMETRY | Facility: CLINIC | Age: 67
End: 2022-02-21
Payer: MEDICARE

## 2022-02-21 ENCOUNTER — CLINICAL SUPPORT (OUTPATIENT)
Dept: OPHTHALMOLOGY | Facility: CLINIC | Age: 67
End: 2022-02-21
Payer: MEDICARE

## 2022-02-21 DIAGNOSIS — H25.813 COMBINED FORM OF AGE-RELATED CATARACT, BOTH EYES: ICD-10-CM

## 2022-02-21 DIAGNOSIS — H52.7 REFRACTIVE ERROR: ICD-10-CM

## 2022-02-21 DIAGNOSIS — H40.1111 PRIMARY OPEN ANGLE GLAUCOMA (POAG) OF RIGHT EYE, MILD STAGE: ICD-10-CM

## 2022-02-21 DIAGNOSIS — H16.223 KERATOCONJUNCTIVITIS SICCA NOT SPECIFIED AS SJOGREN'S, BILATERAL: Primary | ICD-10-CM

## 2022-02-21 DIAGNOSIS — H02.401 PTOSIS OF EYELID, RIGHT: ICD-10-CM

## 2022-02-21 DIAGNOSIS — H40.1122 PRIMARY OPEN ANGLE GLAUCOMA (POAG) OF LEFT EYE, MODERATE STAGE: ICD-10-CM

## 2022-02-21 DIAGNOSIS — E11.9 TYPE 2 DIABETES MELLITUS WITHOUT RETINOPATHY: ICD-10-CM

## 2022-02-21 PROCEDURE — 92014 PR EYE EXAM, EST PATIENT,COMPREHESV: ICD-10-PCS | Mod: S$GLB,,, | Performed by: OPTOMETRIST

## 2022-02-21 PROCEDURE — 92015 PR REFRACTION: ICD-10-PCS | Mod: S$GLB,,, | Performed by: OPTOMETRIST

## 2022-02-21 PROCEDURE — 92014 COMPRE OPH EXAM EST PT 1/>: CPT | Mod: S$GLB,,, | Performed by: OPTOMETRIST

## 2022-02-21 PROCEDURE — 1101F PT FALLS ASSESS-DOCD LE1/YR: CPT | Mod: CPTII,S$GLB,, | Performed by: OPTOMETRIST

## 2022-02-21 PROCEDURE — 1159F MED LIST DOCD IN RCRD: CPT | Mod: CPTII,S$GLB,, | Performed by: OPTOMETRIST

## 2022-02-21 PROCEDURE — 3288F FALL RISK ASSESSMENT DOCD: CPT | Mod: CPTII,S$GLB,, | Performed by: OPTOMETRIST

## 2022-02-21 PROCEDURE — 1126F PR PAIN SEVERITY QUANTIFIED, NO PAIN PRESENT: ICD-10-PCS | Mod: CPTII,S$GLB,, | Performed by: OPTOMETRIST

## 2022-02-21 PROCEDURE — 1159F PR MEDICATION LIST DOCUMENTED IN MEDICAL RECORD: ICD-10-PCS | Mod: CPTII,S$GLB,, | Performed by: OPTOMETRIST

## 2022-02-21 PROCEDURE — 3051F HG A1C>EQUAL 7.0%<8.0%: CPT | Mod: CPTII,S$GLB,, | Performed by: OPTOMETRIST

## 2022-02-21 PROCEDURE — 99999 PR PBB SHADOW E&M-EST. PATIENT-LVL II: ICD-10-PCS | Mod: PBBFAC,,, | Performed by: OPTOMETRIST

## 2022-02-21 PROCEDURE — 3288F PR FALLS RISK ASSESSMENT DOCUMENTED: ICD-10-PCS | Mod: CPTII,S$GLB,, | Performed by: OPTOMETRIST

## 2022-02-21 PROCEDURE — 1101F PR PT FALLS ASSESS DOC 0-1 FALLS W/OUT INJ PAST YR: ICD-10-PCS | Mod: CPTII,S$GLB,, | Performed by: OPTOMETRIST

## 2022-02-21 PROCEDURE — 99999 PR PBB SHADOW E&M-EST. PATIENT-LVL II: CPT | Mod: PBBFAC,,, | Performed by: OPTOMETRIST

## 2022-02-21 PROCEDURE — 3051F PR MOST RECENT HEMOGLOBIN A1C LEVEL 7.0 - < 8.0%: ICD-10-PCS | Mod: CPTII,S$GLB,, | Performed by: OPTOMETRIST

## 2022-02-21 PROCEDURE — 92015 DETERMINE REFRACTIVE STATE: CPT | Mod: S$GLB,,, | Performed by: OPTOMETRIST

## 2022-02-21 PROCEDURE — 1126F AMNT PAIN NOTED NONE PRSNT: CPT | Mod: CPTII,S$GLB,, | Performed by: OPTOMETRIST

## 2022-02-21 RX ORDER — LATANOPROST 50 UG/ML
1 SOLUTION/ DROPS OPHTHALMIC NIGHTLY
Qty: 2.5 ML | Refills: 3 | Status: SHIPPED | OUTPATIENT
Start: 2022-02-21 | End: 2022-06-06 | Stop reason: SDUPTHER

## 2022-02-21 NOTE — PROGRESS NOTES
"HPI     Concerns About Ocular Health      Additional comments: Patient Carolee Bartlett is a 67 year old female.              Comments     CC: Pt here for 1 month PRASHANTH follow up + diabetic DFE. Pt states that dry   eyes have improved but she still has some discomfort. Pt states that she   has blurry VA OD>OS for both distance and near. Pt denies any eye pain. Pt   states light sensitivity, irritation, and red eyes OU; pt believes due to   Graves disease. Pt states that she was told that she had glaucoma 7-8   years ago. Pt states that she may have been using Timolol qhs OU but has   not used the medication in a while. Pt states that she was followed yearly   for glaucoma. Pt states that she was contacted by Dr. Burden's team for   possible July 2022 appointment regarding eyelid ptosis.    DLS: 01/03/2022 with Dr. Martinez    (+) Changes in vision   (-) Pain  (+) Irritation   (-) Itching   (-) Flashes  (-) Floaters  (+) Glasses wearer  (-) CL wearer  (+) Uses eye gtts :(+)Systane AT's gtts 8-9 times daily - "constantly"   (+)Systane nighttime gel qhs OU    Does patient want a refraction today? Yes.     (-) Eye injury  (-) Eye surgery   (+)POHx: (+)PRASHANTH OU (+)ptosis RUL (+)cataracts OU (+)blepharitis OU  (+)FOHx: (+)glaucoma - brother (+)cataracts - mother, sister. brother    (+)HTN  (+)Graves Disease  (+) this morning  Hemoglobin A1C       Date                     Value               Ref Range             Status                01/20/2022               7.1 (H)             4.0 - 5.6 %           Final                            Last edited by Juju Martinez, OD on 2/21/2022 10:55 AM. (History)            Assessment /Plan     For exam results, see Encounter Report.    Keratoconjunctivitis sicca not specified as Sjogren's, bilateral  -     lifitegrast (XIIDRA) 5 % Dpet; Place 1 drop into both eyes 2 (two) times a day.  Dispense: 60 each; Refill: 11    Primary open angle glaucoma (POAG) of right eye, mild stage  -     George " Visual Field - OU - Extended - Both Eyes  -     OCT - Optic Nerve  -     latanoprost 0.005 % ophthalmic solution; Place 1 drop into both eyes every evening.  Dispense: 2.5 mL; Refill: 3    Primary open angle glaucoma (POAG) of left eye, moderate stage  -     latanoprost 0.005 % ophthalmic solution; Place 1 drop into both eyes every evening.  Dispense: 2.5 mL; Refill: 3    Type 2 diabetes mellitus without retinopathy    Combined form of age-related cataract, both eyes    Ptosis of eyelid, right    Refractive error      1. Educated pt on findings. Recommend switching to preservative free ATs and using at least 4x a day. Continue clive QHS. Will also Rx xiidra 1 gtt OU BID --- discussed if xiidra has poor insurance coverage, we will switch to restasis. Discussed delay in drop effectiveness. Pt notes understanding. If symptoms worsen or dont improve, RTC. Monitor.     2-3. Educated pt on findings. H/o POAG in the past, but patient d/c gtt use and was lost to f/u. Cupping OS>OD. IOP raised OU. (+)FHx, brother. Will refer to Dr. Macias due to significant changes noted OS. Able to get patient scheduled for testing this week and f/u next week. Will go ahead and call in latanoprost 1 gtt OU QHS until patient can be seen by Dr. Macias. Defer future treatment/managment to Dr. Macias. Monitor.     4. No retinopathy noted, OU. Continue proper BS control and annual diabetic eye exams. Monitor yearly.     5. Educated pt on findings. Mildly visually significant. Discussed with patient that Dr. Macias can also remove cataracts. Patient would like to talk with specalist. May be a MIGs candidate. Ronald.     6. Patient has appointment summer 2022 with Dr. Burden for eval. Monitor.     7. Updated SRx. Mild change from habitual. Monitor yearly.       RTC with Dr. Macias and Dr. Burden as directed, me prn.

## 2022-02-22 NOTE — PROGRESS NOTES
Hvf done ou. Rel/fix coop. Good ou. Chart checked for latex allergy./plano/od plano/os-[facility]

## 2022-02-23 NOTE — PROGRESS NOTES
Subjective:       Patient ID: Carolee Bartlett is a 67 y.o. female.    Chief Complaint: Glaucoma     HPI     DLS: 2/21/2022 With Dr. Martinez    PT here for Glaucoma Eval per Dr. Martinez; Pt reports previous glaucoma   diagnosis 4-5 years ago. Brother and mother both with glaucoma. Dry eye   bothering her the most. Notices dark blind spots in her vision.     Meds;  Latanoprost QHS OU  Xiidra BID OU    Last edited by Soniya Macias MD on 3/2/2022  9:31 AM. (History)            Assessment & Plan   Chronic angle-closure glaucoma of both eyes, severe stage    Keratoconjunctivitis sicca of both eyes    Combined forms of age-related cataract of both eyes       Referral from Dr. Martinez    CAC severe OU  Phacomorphic narrowing - angle quite narrow OU  -Fhx (+Mother, +Brother), Steroids (),Trauma()  -Drops: Latanoprost qHS OU   -Drop intolerance/contraindication: -  -Laser:-  -Surgeries:-  -CCT: 605 // 640 thick  -Gonio: TM OU    Tm 21//23    Performed independent review of outside records including notes and tests  Discussed imaging and interpretation with patient.   Discussed eyedrops and if more than one, recommend 5 minutes between all drops.     Dr. Martinez started Latanoprost 2/21/22 --> IOP improved 15//18 --> OK for now    2/22 HVF SAD/IAD OU with high FN   2/22 RNFL Dec TI OD // Dec G/TS/TI B T OS     Continue present management with drops for now    Discussed may need cataract extraction to relieve angle closure and decrease further glaucoma risk      Combined forms of age-related cataract OU  Visually significant  Causing angle closure  Cataract eval next visit      PRASHANTH OU  Just started Xiidra 2/21/22 --> continue, advised will take some time to start working  Change AT to PFAT --> using full-preserved AT many times per day  Provided handout and education    Graves disease  No overt E/o ELMER on exam   Monitor     Eyelid ptosis  Monitor    PLAN  Cont Latanoprost  Change AT to PFAT - use ad zaki    RTC 3 months with IOL  calculations, BAT, cataract eval     Soniya Macias M.D., M.S.  Department of Ophthalmology   Division of Glaucoma Surgery  [facility]

## 2022-03-02 ENCOUNTER — OFFICE VISIT (OUTPATIENT)
Dept: OPHTHALMOLOGY | Facility: CLINIC | Age: 67
End: 2022-03-02
Payer: MEDICARE

## 2022-03-02 DIAGNOSIS — H25.813 COMBINED FORMS OF AGE-RELATED CATARACT OF BOTH EYES: ICD-10-CM

## 2022-03-02 DIAGNOSIS — H40.2233 CHRONIC ANGLE-CLOSURE GLAUCOMA OF BOTH EYES, SEVERE STAGE: Primary | ICD-10-CM

## 2022-03-02 DIAGNOSIS — M35.01 KERATOCONJUNCTIVITIS SICCA OF BOTH EYES: ICD-10-CM

## 2022-03-02 PROCEDURE — 1126F AMNT PAIN NOTED NONE PRSNT: CPT | Mod: CPTII,S$GLB,, | Performed by: STUDENT IN AN ORGANIZED HEALTH CARE EDUCATION/TRAINING PROGRAM

## 2022-03-02 PROCEDURE — 99204 OFFICE O/P NEW MOD 45 MIN: CPT | Mod: S$GLB,,, | Performed by: STUDENT IN AN ORGANIZED HEALTH CARE EDUCATION/TRAINING PROGRAM

## 2022-03-02 PROCEDURE — 99999 PR PBB SHADOW E&M-EST. PATIENT-LVL II: ICD-10-PCS | Mod: PBBFAC,,, | Performed by: STUDENT IN AN ORGANIZED HEALTH CARE EDUCATION/TRAINING PROGRAM

## 2022-03-02 PROCEDURE — 1126F PR PAIN SEVERITY QUANTIFIED, NO PAIN PRESENT: ICD-10-PCS | Mod: CPTII,S$GLB,, | Performed by: STUDENT IN AN ORGANIZED HEALTH CARE EDUCATION/TRAINING PROGRAM

## 2022-03-02 PROCEDURE — 92020 GONIOSCOPY: CPT | Mod: S$GLB,,, | Performed by: STUDENT IN AN ORGANIZED HEALTH CARE EDUCATION/TRAINING PROGRAM

## 2022-03-02 PROCEDURE — 1159F PR MEDICATION LIST DOCUMENTED IN MEDICAL RECORD: ICD-10-PCS | Mod: CPTII,S$GLB,, | Performed by: STUDENT IN AN ORGANIZED HEALTH CARE EDUCATION/TRAINING PROGRAM

## 2022-03-02 PROCEDURE — 76514 ECHO EXAM OF EYE THICKNESS: CPT | Mod: S$GLB,,, | Performed by: STUDENT IN AN ORGANIZED HEALTH CARE EDUCATION/TRAINING PROGRAM

## 2022-03-02 PROCEDURE — 1101F PR PT FALLS ASSESS DOC 0-1 FALLS W/OUT INJ PAST YR: ICD-10-PCS | Mod: CPTII,S$GLB,, | Performed by: STUDENT IN AN ORGANIZED HEALTH CARE EDUCATION/TRAINING PROGRAM

## 2022-03-02 PROCEDURE — 76514 PR  US, EYE, FOR CORNEAL THICKNESS: ICD-10-PCS | Mod: S$GLB,,, | Performed by: STUDENT IN AN ORGANIZED HEALTH CARE EDUCATION/TRAINING PROGRAM

## 2022-03-02 PROCEDURE — 3288F PR FALLS RISK ASSESSMENT DOCUMENTED: ICD-10-PCS | Mod: CPTII,S$GLB,, | Performed by: STUDENT IN AN ORGANIZED HEALTH CARE EDUCATION/TRAINING PROGRAM

## 2022-03-02 PROCEDURE — 92020 PR SPECIAL EYE EVAL,GONIOSCOPY: ICD-10-PCS | Mod: S$GLB,,, | Performed by: STUDENT IN AN ORGANIZED HEALTH CARE EDUCATION/TRAINING PROGRAM

## 2022-03-02 PROCEDURE — 99204 PR OFFICE/OUTPT VISIT, NEW, LEVL IV, 45-59 MIN: ICD-10-PCS | Mod: S$GLB,,, | Performed by: STUDENT IN AN ORGANIZED HEALTH CARE EDUCATION/TRAINING PROGRAM

## 2022-03-02 PROCEDURE — 1160F RVW MEDS BY RX/DR IN RCRD: CPT | Mod: CPTII,S$GLB,, | Performed by: STUDENT IN AN ORGANIZED HEALTH CARE EDUCATION/TRAINING PROGRAM

## 2022-03-02 PROCEDURE — 1159F MED LIST DOCD IN RCRD: CPT | Mod: CPTII,S$GLB,, | Performed by: STUDENT IN AN ORGANIZED HEALTH CARE EDUCATION/TRAINING PROGRAM

## 2022-03-02 PROCEDURE — 1160F PR REVIEW ALL MEDS BY PRESCRIBER/CLIN PHARMACIST DOCUMENTED: ICD-10-PCS | Mod: CPTII,S$GLB,, | Performed by: STUDENT IN AN ORGANIZED HEALTH CARE EDUCATION/TRAINING PROGRAM

## 2022-03-02 PROCEDURE — 3051F PR MOST RECENT HEMOGLOBIN A1C LEVEL 7.0 - < 8.0%: ICD-10-PCS | Mod: CPTII,S$GLB,, | Performed by: STUDENT IN AN ORGANIZED HEALTH CARE EDUCATION/TRAINING PROGRAM

## 2022-03-02 PROCEDURE — 3051F HG A1C>EQUAL 7.0%<8.0%: CPT | Mod: CPTII,S$GLB,, | Performed by: STUDENT IN AN ORGANIZED HEALTH CARE EDUCATION/TRAINING PROGRAM

## 2022-03-02 PROCEDURE — 3288F FALL RISK ASSESSMENT DOCD: CPT | Mod: CPTII,S$GLB,, | Performed by: STUDENT IN AN ORGANIZED HEALTH CARE EDUCATION/TRAINING PROGRAM

## 2022-03-02 PROCEDURE — 1101F PT FALLS ASSESS-DOCD LE1/YR: CPT | Mod: CPTII,S$GLB,, | Performed by: STUDENT IN AN ORGANIZED HEALTH CARE EDUCATION/TRAINING PROGRAM

## 2022-03-02 PROCEDURE — 99999 PR PBB SHADOW E&M-EST. PATIENT-LVL II: CPT | Mod: PBBFAC,,, | Performed by: STUDENT IN AN ORGANIZED HEALTH CARE EDUCATION/TRAINING PROGRAM

## 2022-03-21 ENCOUNTER — PATIENT MESSAGE (OUTPATIENT)
Dept: ADMINISTRATIVE | Facility: HOSPITAL | Age: 67
End: 2022-03-21
Payer: MEDICARE

## 2022-03-21 DIAGNOSIS — Z12.11 SCREENING FOR COLON CANCER: ICD-10-CM

## 2022-04-13 DIAGNOSIS — Z12.11 COLON CANCER SCREENING: ICD-10-CM

## 2022-04-20 ENCOUNTER — LAB VISIT (OUTPATIENT)
Dept: LAB | Facility: HOSPITAL | Age: 67
End: 2022-04-20
Attending: INTERNAL MEDICINE
Payer: MEDICARE

## 2022-04-20 ENCOUNTER — OFFICE VISIT (OUTPATIENT)
Dept: INTERNAL MEDICINE | Facility: CLINIC | Age: 67
End: 2022-04-20
Payer: MEDICARE

## 2022-04-20 VITALS
WEIGHT: 222.69 LBS | OXYGEN SATURATION: 99 % | DIASTOLIC BLOOD PRESSURE: 70 MMHG | TEMPERATURE: 99 F | SYSTOLIC BLOOD PRESSURE: 132 MMHG | BODY MASS INDEX: 39.46 KG/M2 | HEART RATE: 70 BPM | HEIGHT: 63 IN

## 2022-04-20 DIAGNOSIS — E89.0 POSTABLATIVE HYPOTHYROIDISM: ICD-10-CM

## 2022-04-20 DIAGNOSIS — R73.02 IMPAIRED GLUCOSE TOLERANCE: Primary | ICD-10-CM

## 2022-04-20 DIAGNOSIS — Z12.12 SCREENING FOR COLORECTAL CANCER: ICD-10-CM

## 2022-04-20 DIAGNOSIS — E78.5 HYPERLIPIDEMIA, UNSPECIFIED HYPERLIPIDEMIA TYPE: ICD-10-CM

## 2022-04-20 DIAGNOSIS — Z11.59 ENCOUNTER FOR HEPATITIS C SCREENING TEST FOR LOW RISK PATIENT: ICD-10-CM

## 2022-04-20 DIAGNOSIS — R73.02 IMPAIRED GLUCOSE TOLERANCE: ICD-10-CM

## 2022-04-20 DIAGNOSIS — Z12.11 SCREENING FOR COLORECTAL CANCER: ICD-10-CM

## 2022-04-20 DIAGNOSIS — I10 HYPERTENSION, UNSPECIFIED TYPE: ICD-10-CM

## 2022-04-20 DIAGNOSIS — E11.65 TYPE 2 DIABETES MELLITUS WITH HYPERGLYCEMIA, WITHOUT LONG-TERM CURRENT USE OF INSULIN: ICD-10-CM

## 2022-04-20 DIAGNOSIS — R25.2 MUSCLE CRAMPS: ICD-10-CM

## 2022-04-20 LAB
ESTIMATED AVG GLUCOSE: 148 MG/DL (ref 68–131)
HBA1C MFR BLD: 6.8 % (ref 4–5.6)
T4 FREE SERPL-MCNC: 1.08 NG/DL (ref 0.71–1.51)
TSH SERPL DL<=0.005 MIU/L-ACNC: 0.01 UIU/ML (ref 0.4–4)

## 2022-04-20 PROCEDURE — 3008F BODY MASS INDEX DOCD: CPT | Mod: CPTII,S$GLB,, | Performed by: INTERNAL MEDICINE

## 2022-04-20 PROCEDURE — 1125F AMNT PAIN NOTED PAIN PRSNT: CPT | Mod: CPTII,S$GLB,, | Performed by: INTERNAL MEDICINE

## 2022-04-20 PROCEDURE — 3075F SYST BP GE 130 - 139MM HG: CPT | Mod: CPTII,S$GLB,, | Performed by: INTERNAL MEDICINE

## 2022-04-20 PROCEDURE — 1160F PR REVIEW ALL MEDS BY PRESCRIBER/CLIN PHARMACIST DOCUMENTED: ICD-10-PCS | Mod: CPTII,S$GLB,, | Performed by: INTERNAL MEDICINE

## 2022-04-20 PROCEDURE — 36415 COLL VENOUS BLD VENIPUNCTURE: CPT | Performed by: INTERNAL MEDICINE

## 2022-04-20 PROCEDURE — 99999 PR PBB SHADOW E&M-EST. PATIENT-LVL IV: CPT | Mod: PBBFAC,,, | Performed by: INTERNAL MEDICINE

## 2022-04-20 PROCEDURE — 3078F DIAST BP <80 MM HG: CPT | Mod: CPTII,S$GLB,, | Performed by: INTERNAL MEDICINE

## 2022-04-20 PROCEDURE — 83036 HEMOGLOBIN GLYCOSYLATED A1C: CPT | Performed by: INTERNAL MEDICINE

## 2022-04-20 PROCEDURE — 3008F PR BODY MASS INDEX (BMI) DOCUMENTED: ICD-10-PCS | Mod: CPTII,S$GLB,, | Performed by: INTERNAL MEDICINE

## 2022-04-20 PROCEDURE — 3051F PR MOST RECENT HEMOGLOBIN A1C LEVEL 7.0 - < 8.0%: ICD-10-PCS | Mod: CPTII,S$GLB,, | Performed by: INTERNAL MEDICINE

## 2022-04-20 PROCEDURE — 3075F PR MOST RECENT SYSTOLIC BLOOD PRESS GE 130-139MM HG: ICD-10-PCS | Mod: CPTII,S$GLB,, | Performed by: INTERNAL MEDICINE

## 2022-04-20 PROCEDURE — 1101F PR PT FALLS ASSESS DOC 0-1 FALLS W/OUT INJ PAST YR: ICD-10-PCS | Mod: CPTII,S$GLB,, | Performed by: INTERNAL MEDICINE

## 2022-04-20 PROCEDURE — 3288F PR FALLS RISK ASSESSMENT DOCUMENTED: ICD-10-PCS | Mod: CPTII,S$GLB,, | Performed by: INTERNAL MEDICINE

## 2022-04-20 PROCEDURE — 3288F FALL RISK ASSESSMENT DOCD: CPT | Mod: CPTII,S$GLB,, | Performed by: INTERNAL MEDICINE

## 2022-04-20 PROCEDURE — 99214 PR OFFICE/OUTPT VISIT, EST, LEVL IV, 30-39 MIN: ICD-10-PCS | Mod: S$GLB,,, | Performed by: INTERNAL MEDICINE

## 2022-04-20 PROCEDURE — 3078F PR MOST RECENT DIASTOLIC BLOOD PRESSURE < 80 MM HG: ICD-10-PCS | Mod: CPTII,S$GLB,, | Performed by: INTERNAL MEDICINE

## 2022-04-20 PROCEDURE — 1125F PR PAIN SEVERITY QUANTIFIED, PAIN PRESENT: ICD-10-PCS | Mod: CPTII,S$GLB,, | Performed by: INTERNAL MEDICINE

## 2022-04-20 PROCEDURE — 1160F RVW MEDS BY RX/DR IN RCRD: CPT | Mod: CPTII,S$GLB,, | Performed by: INTERNAL MEDICINE

## 2022-04-20 PROCEDURE — 1101F PT FALLS ASSESS-DOCD LE1/YR: CPT | Mod: CPTII,S$GLB,, | Performed by: INTERNAL MEDICINE

## 2022-04-20 PROCEDURE — 84439 ASSAY OF FREE THYROXINE: CPT | Performed by: INTERNAL MEDICINE

## 2022-04-20 PROCEDURE — 84443 ASSAY THYROID STIM HORMONE: CPT | Performed by: INTERNAL MEDICINE

## 2022-04-20 PROCEDURE — 99214 OFFICE O/P EST MOD 30 MIN: CPT | Mod: S$GLB,,, | Performed by: INTERNAL MEDICINE

## 2022-04-20 PROCEDURE — 3051F HG A1C>EQUAL 7.0%<8.0%: CPT | Mod: CPTII,S$GLB,, | Performed by: INTERNAL MEDICINE

## 2022-04-20 PROCEDURE — 1159F MED LIST DOCD IN RCRD: CPT | Mod: CPTII,S$GLB,, | Performed by: INTERNAL MEDICINE

## 2022-04-20 PROCEDURE — 99999 PR PBB SHADOW E&M-EST. PATIENT-LVL IV: ICD-10-PCS | Mod: PBBFAC,,, | Performed by: INTERNAL MEDICINE

## 2022-04-20 PROCEDURE — 1159F PR MEDICATION LIST DOCUMENTED IN MEDICAL RECORD: ICD-10-PCS | Mod: CPTII,S$GLB,, | Performed by: INTERNAL MEDICINE

## 2022-04-20 RX ORDER — SOFT LENS ADJUNCTIVE SOLUTIONS
DROPS MISCELLANEOUS
COMMUNITY
End: 2022-06-13 | Stop reason: CLARIF

## 2022-04-20 RX ORDER — METFORMIN HYDROCHLORIDE 500 MG/1
500 TABLET ORAL
Qty: 90 TABLET | Refills: 1
Start: 2022-04-20 | End: 2022-04-22

## 2022-04-20 NOTE — PATIENT INSTRUCTIONS
Normal A1c <5.6  Prediabetes 5.7-6.4  Diabetes = 6.5 and above  You are 7.1    Disregard Fit Kit, will send amee from [employer] in the mail for colon cancer screening    Labs today      Magnesium supplememnt over the counter for muscle pain

## 2022-04-20 NOTE — PROGRESS NOTES
[facility] Note    Chief Complaint      Chief Complaint   Patient presents with    Establish Care    Follow-up     3 mth    Hand Pain     Bilateral hand pain for last few months    Hip Pain     Hx of total hip replacement 3 years ago     History of Present Illness      Carolee Bartlett is a 67 y.o. female who presents today for chief complaint follow up chronic issues . Patient is new to me.  Former PCP Naya Portillo MD  PCP: Renata Gomez MD  Patient comes to appointment alone.     HPI   preDM on metformin once daily was started at visit with NP in Jan, no prior a1c, is on arb and statin, tolerating  well, mild gi discomfort   Sees ophthalomology DrGilberto, no LE neuropathy  Lab Results   Component Value Date    HGBA1C 7.1 (H) 01/20/2022     htn at goal on losaratn hctz  hld doing well on lipitor   Lab Results   Component Value Date    LDLCALC 97.0 12/27/2021     Hypothyroid on levothyroxine 100, normal reflex t4, has gland, had radioactiver iodine ablation at [facility], has been on synthroid nearly 15 years, dose recently changed from 112 to 100   Lab Results   Component Value Date    TSH 0.025 (L) 12/27/2021       mmg 11.21, fit ordered 4.22  dexa not had   Gyn does not have  Needs hcv screen      Endorses muscle cramps in hands in feet, not nocturnal, conner horse, this has been going on a few months    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   Active Problem List with Overview Notes    Diagnosis Date Noted    Impaired glucose tolerance 04/20/2022    Hyperlipidemia 04/20/2022    Hypertension 04/20/2022    Muscle cramps 04/20/2022    Avascular necrosis of right femur 11/26/2018    S/P total hip arthroplasty 11/26/2018    Postablative hypothyroidism 11/26/2018       Health Maintenance   Topic Date Due    Hepatitis C Screening  Never done    TETANUS VACCINE  Never done    DEXA Scan  Never done    Mammogram  11/03/2022    Hemoglobin A1c  01/20/2023    Lipid Panel   12/27/2026       Past Medical History:   Diagnosis Date    Avascular necrosis     Encounter for blood transfusion     Graves disease     Other abnormal glucose     Pre-Diabetic    Sciatica        Past Surgical History:   Procedure Laterality Date    HIP ARTHROPLASTY Right 11/26/2018    Procedure: ARTHROPLASTY, HIP, [place], peg board, first assist;  Surgeon: Olayinka Reddign MD;  Location: [facility];  Service: Orthopedics;  Laterality: Right;  NOE ORTHO CONFIRMED 11/20/DME FIRST ASSISTANT CONFIRMED 11/21/DME    TUBAL LIGATION         family history includes Breast cancer (age of onset: 33) in her sister; Heart attack (age of onset: 40) in her brother; Hypertension in her mother; Kidney failure in her mother; Stomach cancer in her father.    Social History     Tobacco Use    Smoking status: Current Some Day Smoker     Packs/day: 0.25     Types: Cigarettes    Smokeless tobacco: Never Used   Substance Use Topics    Alcohol use: No    Drug use: No       ROS     Outpatient Encounter Medications as of 4/20/2022   Medication Sig Dispense Refill    atorvastatin (LIPITOR) 10 MG tablet Take 1 tablet (10 mg total) by mouth once daily. 90 tablet 3    hard/soft/gas permeable prods (SYSTANE CONTACTS) Drop Apply to eye.      latanoprost 0.005 % ophthalmic solution Place 1 drop into both eyes every evening. 2.5 mL 3    levothyroxine (SYNTHROID) 100 MCG tablet Take 1 tablet (100 mcg total) by mouth before breakfast. 90 tablet 3    lifitegrast (XIIDRA) 5 % Dpet Place 1 drop into both eyes 2 (two) times a day. 60 each 11    losartan-hydrochlorothiazide 50-12.5 mg (HYZAAR) 50-12.5 mg per tablet Take 1 tablet by mouth once daily. 90 tablet 3    [DISCONTINUED] metFORMIN (GLUCOPHAGE) 500 MG tablet Take 1 tab by mouth with breakfast daily x 5 days, then increase to 1 tab by mouth twice daily with meals (Patient taking differently: Take 500 mg by mouth daily with breakfast.) 180 tablet 0    metFORMIN (GLUCOPHAGE)  "500 MG tablet Take 1 tablet (500 mg total) by mouth daily with breakfast. 90 tablet 1     No facility-administered encounter medications on file as of 4/20/2022.        Review of patient's allergies indicates:  No Known Allergies        Physical Exam      Vital Signs  Temp: 98.6 °F (37 °C)  Temp src: Oral  Pulse: 70  SpO2: 99 %  BP: 132/70  BP Location: Left arm  Patient Position: Sitting  Pain Score:   2  Height and Weight  Height: 5' 3" (160 cm)  Weight: 101 kg (222 lb 10.6 oz)  BSA (Calculated - sq m): 2.12 sq meters  BMI (Calculated): 39.5  Weight in (lb) to have BMI = 25: 140.8]    Physical Exam  Vitals reviewed.   Constitutional:       General: She is not in acute distress.     Appearance: She is well-developed. She is not diaphoretic.   HENT:      Head: Normocephalic and atraumatic.      Right Ear: External ear normal.      Left Ear: External ear normal.      Nose: Nose normal.   Eyes:      General:         Right eye: No discharge.         Left eye: No discharge.      Conjunctiva/sclera: Conjunctivae normal.   Cardiovascular:      Rate and Rhythm: Normal rate and regular rhythm.      Heart sounds: Normal heart sounds.   Pulmonary:      Effort: Pulmonary effort is normal. No respiratory distress.      Breath sounds: Normal breath sounds. No wheezing.   Musculoskeletal:         General: Normal range of motion.      Cervical back: Normal range of motion.   Skin:     Coloration: Skin is not pale.      Findings: No rash.   Neurological:      Mental Status: She is alert and oriented to person, place, and time.   Psychiatric:         Behavior: Behavior normal.         Thought Content: Thought content normal.         Judgment: Judgment normal.          Laboratory:  CBC:  No results for input(s): WBC, RBC, HGB, HCT, PLT, MCV, MCH, MCHC in the last 2160 hours.  CMP:  No results for input(s): GLU, CALCIUM, ALBUMIN, PROT, NA, K, CO2, CL, BUN, ALKPHOS, ALT, AST, BILITOT in the last 2160 hours.    Invalid input(s): " CREATININ  URINALYSIS:  No results for input(s): COLORU, CLARITYU, SPECGRAV, PHUR, PROTEINUA, GLUCOSEU, BILIRUBINCON, BLOODU, WBCU, RBCU, BACTERIA, MUCUS, NITRITE, LEUKOCYTESUR, UROBILINOGEN, HYALINECASTS in the last 2160 hours.   LIPIDS:  No results for input(s): TSH, HDL, CHOL, TRIG, LDLCALC, CHOLHDL, NONHDLCHOL, TOTALCHOLEST in the last 2160 hours.  TSH:  No results for input(s): TSH in the last 2160 hours.  A1C:  Recent Labs   Lab Result Units 01/20/22  1352   Hemoglobin A1C % 7.1*       Radiology:      Assessment/Plan     Carolee Bartlett is a 67 y.o.female with:    Postablative hypothyroidism  meds adjusted 12 weeks bal, juventino TSH today, refills not needed    Impaired glucose tolerance  Juventino a1c on metformin     Hypertension  At goal no change     Hyperlipidemia  LDL<100    Muscle cramps  Electrolyte and magnesium supplementation discussed     A1c, TFTs today  cologuard order  Follow up in 6 mos for annual w labs prior   order dexa with mmg at follow up   Orders Placed This Encounter   Procedures    Cologuard Screening (Multitarget Stool DNA)     Standing Status:   Future     Number of Occurrences:   1     Standing Expiration Date:   6/19/2023    T4, Free     Standing Status:   Future     Standing Expiration Date:   6/20/2023    TSH     Standing Status:   Future     Standing Expiration Date:   6/20/2023    Hemoglobin A1C     Standing Status:   Future     Standing Expiration Date:   6/19/2023    Comprehensive Metabolic Panel     Standing Status:   Future     Standing Expiration Date:   6/19/2023    Lipid Panel     Standing Status:   Future     Standing Expiration Date:   6/19/2023    Microalbumin/Creatinine Ratio, Urine     Standing Status:   Future     Standing Expiration Date:   4/20/2023     Order Specific Question:   Specimen Source     Answer:   Urine    Hemoglobin A1C     Standing Status:   Future     Standing Expiration Date:   6/19/2023    CBC Without Differential     Standing Status:   Future      Standing Expiration Date:   6/19/2023    Hepatitis C Antibody     Standing Status:   Future     Standing Expiration Date:   6/19/2023     Order Specific Question:   Release to patient     Answer:   Immediate       Use of the [employer] Patient Portal discussed and encouraged during today's visit  -Continue current medications and maintain follow up with specialists.  Return to clinic in 6 months.for annual   Future Appointments   Date Time Provider Department Center   6/22/2022  9:15 AM Soniya Macias MD [facility]   7/14/2022  9:15 AM CARMEN PLAZA [facility]   7/14/2022 10:00 AM Cheyanne Burden MD [facility]   10/20/2022  8:00 AM [facility], [facility] [place]   10/20/2022  8:15 AM LAB, [facility] [place]       Renata Gomez MD  4/20/2022 11:39 AM    Primary Care Internal Medicine

## 2022-04-21 ENCOUNTER — PATIENT MESSAGE (OUTPATIENT)
Dept: INTERNAL MEDICINE | Facility: CLINIC | Age: 67
End: 2022-04-21
Payer: MEDICARE

## 2022-04-28 ENCOUNTER — TELEPHONE (OUTPATIENT)
Dept: ADMINISTRATIVE | Facility: HOSPITAL | Age: 67
End: 2022-04-28
Payer: MEDICARE

## 2022-04-28 ENCOUNTER — PATIENT OUTREACH (OUTPATIENT)
Dept: ADMINISTRATIVE | Facility: HOSPITAL | Age: 67
End: 2022-04-28
Payer: MEDICARE

## 2022-04-28 DIAGNOSIS — E89.0 POSTABLATIVE HYPOTHYROIDISM: Primary | ICD-10-CM

## 2022-04-28 DIAGNOSIS — R73.02 IMPAIRED GLUCOSE TOLERANCE: ICD-10-CM

## 2022-05-17 ENCOUNTER — PES CALL (OUTPATIENT)
Dept: ADMINISTRATIVE | Facility: CLINIC | Age: 67
End: 2022-05-17
Payer: MEDICARE

## 2022-05-27 ENCOUNTER — PATIENT MESSAGE (OUTPATIENT)
Dept: ADMINISTRATIVE | Facility: HOSPITAL | Age: 67
End: 2022-05-27
Payer: MEDICARE

## 2022-05-30 ENCOUNTER — PATIENT MESSAGE (OUTPATIENT)
Dept: ADMINISTRATIVE | Facility: HOSPITAL | Age: 67
End: 2022-05-30
Payer: MEDICARE

## 2022-06-09 ENCOUNTER — HOSPITAL ENCOUNTER (EMERGENCY)
Facility: HOSPITAL | Age: 67
Discharge: HOME | End: 2022-06-09
Attending: INTERNAL MEDICINE
Payer: MEDICARE

## 2022-06-09 ENCOUNTER — NURSE TRIAGE (OUTPATIENT)
Dept: ADMINISTRATIVE | Facility: CLINIC | Age: 67
End: 2022-06-09
Payer: MEDICARE

## 2022-06-09 VITALS
DIASTOLIC BLOOD PRESSURE: 54 MMHG | WEIGHT: 217 LBS | OXYGEN SATURATION: 98 % | HEIGHT: 62 IN | RESPIRATION RATE: 20 BRPM | HEART RATE: 69 BPM | BODY MASS INDEX: 39.93 KG/M2 | SYSTOLIC BLOOD PRESSURE: 152 MMHG | TEMPERATURE: 99 F

## 2022-06-09 DIAGNOSIS — H10.9 CONJUNCTIVITIS OF BOTH EYES, UNSPECIFIED CONJUNCTIVITIS TYPE: ICD-10-CM

## 2022-06-09 DIAGNOSIS — H40.9 GLAUCOMA, UNSPECIFIED GLAUCOMA TYPE, UNSPECIFIED LATERALITY: Primary | ICD-10-CM

## 2022-06-09 LAB — POCT GLUCOSE: 93 MG/DL (ref 70–110)

## 2022-06-09 PROCEDURE — 82962 GLUCOSE BLOOD TEST: CPT | Mod: ER

## 2022-06-09 PROCEDURE — 99283 EMERGENCY DEPT VISIT LOW MDM: CPT | Mod: 25,ER

## 2022-06-09 PROCEDURE — 25000003 PHARM REV CODE 250: Mod: ER | Performed by: NURSE PRACTITIONER

## 2022-06-09 RX ORDER — PROPARACAINE HYDROCHLORIDE 5 MG/ML
1 SOLUTION/ DROPS OPHTHALMIC
Status: COMPLETED | OUTPATIENT
Start: 2022-06-09 | End: 2022-06-09

## 2022-06-09 RX ADMIN — PROPARACAINE HYDROCHLORIDE 1 DROP: 5 SOLUTION/ DROPS OPHTHALMIC at 08:06

## 2022-06-09 RX ADMIN — FLUORESCEIN SODIUM 1 EACH: 1 STRIP OPHTHALMIC at 08:06

## 2022-06-09 NOTE — TELEPHONE ENCOUNTER
[place]    PCP:  Dr. Renata Gomez    C/O redness, itchy, burning, watery, drainage, swollen, blurry vision, and dry eyes.  H/O glaucoma, Thyroid disease.  Denies fever, runny nose, and cough.  Per protocol, care advised is go to the ED now.  Instructed to call for questions/concerns.  VU.    Reason for Disposition   SEVERE eye pain (e.g., excruciating)    Protocols used: EYE - PUS OR [id number]

## 2022-06-10 ENCOUNTER — TELEPHONE (OUTPATIENT)
Dept: OPHTHALMOLOGY | Facility: CLINIC | Age: 67
End: 2022-06-10
Payer: MEDICARE

## 2022-06-10 NOTE — ED PROVIDER NOTES
"Encounter Date: 6/9/2022    SCRIBE #1 NOTE: I, Oral George, am scribing for, and in the presence of,  Dr. Gay. I have scribed the following portions of the note - Other sections scribed: HPI, ROS, PE.       History     Chief Complaint   Patient presents with    Eye Problem     PT C/O BILAT EYE REDNESS, DRAINAGE, ITCHING AND PHOTOPHOBIA SINCE THIS AM, HX OF GLAUCOMA     Carolee Bartlett is a 67 y.o. female with Hx of glaucoma and dry eye disease who presents to the ED for evaluation of bilateral eye itching, redness, drainage, and photophobia onset this morning. Patient states, "it feels like rocks are in my eyes." She attempted to treat symptoms with her prescribed ophthalmic solutions with no relief. Denies all other complaints at the present time.    The history is provided by the patient. No  was used.     Review of patient's allergies indicates:  No Known Allergies  Past Medical History:   Diagnosis Date    Avascular necrosis     Encounter for blood transfusion     Graves disease     Other abnormal glucose     Pre-Diabetic    Sciatica      Past Surgical History:   Procedure Laterality Date    HIP ARTHROPLASTY Right 11/26/2018    Procedure: ARTHROPLASTY, HIP, [place], peg board, first assist;  Surgeon: Olayinka Redding MD;  Location: [facility] OR;  Service: Orthopedics;  Laterality: Right;  NOE ORTHO CONFIRMED 11/20/DME FIRST ASSISTANT CONFIRMED 11/21/DME    TUBAL LIGATION       Family History   Problem Relation Age of Onset    Kidney failure Mother     Hypertension Mother         was on list to have kidney, lung, and heart transplant    Stomach cancer Father     Breast cancer Sister 33        double mastectomy    Heart attack Brother 40     Social History     Tobacco Use    Smoking status: Current Some Day Smoker     Packs/day: 0.25     Types: Cigarettes    Smokeless tobacco: Never Used   Substance Use Topics    Alcohol use: No    Drug use: No     Review of " Systems   Constitutional: Negative for fever.   HENT: Negative for sore throat.    Eyes: Positive for photophobia, discharge, redness and itching.   Respiratory: Negative for shortness of breath.    Cardiovascular: Negative for chest pain.   Gastrointestinal: Negative for nausea.   Genitourinary: Negative for dysuria.   Musculoskeletal: Negative for back pain.   Skin: Negative for rash.   Neurological: Negative for weakness.   Hematological: Does not bruise/bleed easily.   All other systems reviewed and are negative.      Physical Exam     Initial Vitals [06/09/22 1911]   BP Pulse Resp Temp SpO2   (!) 167/79 90 20 98.7 °F (37.1 °C) 97 %      MAP       --         Physical Exam    Nursing note and vitals reviewed.  Constitutional: She appears well-developed and well-nourished.   HENT:   Head: Normocephalic and atraumatic.   Eyes:   (+) Bilateral conjunctival erythema. No discharge   Neck: Neck supple.   Normal range of motion.  Cardiovascular: Normal rate, regular rhythm and normal heart sounds. Exam reveals no gallop and no friction rub.    No murmur heard.  Pulmonary/Chest: Breath sounds normal. No respiratory distress. She has no wheezes. She has no rhonchi. She has no rales.   Abdominal: Abdomen is soft. There is no abdominal tenderness.   Musculoskeletal:         General: No edema. Normal range of motion.      Cervical back: Normal range of motion and neck supple.     Neurological: She is alert and oriented to person, place, and time. GCS score is 15. GCS eye subscore is 4. GCS verbal subscore is 5. GCS motor subscore is 6.   Skin: Skin is warm and dry.   Psychiatric: She has a normal mood and affect.         ED Course   Procedures  Labs Reviewed   POCT GLUCOSE          Imaging Results    None          Medications   fluorescein ophthalmic strip 1 each (1 each Both Eyes Given 6/9/22 2043)   proparacaine 0.5 % ophthalmic solution 1 drop (1 drop Both Eyes Given 6/9/22 2043)     Medical Decision Making:   History:  "  Old Medical Records: I decided to obtain old medical records.  Initial Assessment:   Carolee Bartlett is a 67 y.o. female with Hx of glaucoma and dry eye disease who presents to the ED for evaluation of bilateral eye itching, redness, drainage, and photophobia onset this morning. Patient states, "it feels like rocks are in my eyes." She attempted to treat symptoms with her prescribed ophthalmic solutions with no relief. Denies all other complaints at the present time.  Clinical Tests:   Lab Tests: Ordered and Reviewed  ED Management:  Douglas-Pen measurement for left eye was 47. Patient refused attempts for right eye measurements and stated that she has to get home right now.  She states she  will follow up with Ophthalmology tomorrow for further evaluation/treatment.  She was counseled the need to see Ophthalmology as soon as possible/return to the emergency department if condition worsens.  Instructions for glaucoma were given prior to discharge.          Scribe Attestation:   Scribe #1: I performed the above scribed service and the documentation accurately describes the services I performed. I attest to the accuracy of the note.               This document was produced by a scribe under my direction and in my presence. I agree with the content of the note and have made any necessary edits.     Dr. Gay    06/10/2022 9:50 PM    Clinical Impression:   Final diagnoses:  [H10.9] Conjunctivitis of both eyes, unspecified conjunctivitis type  [H40.9] Glaucoma, unspecified glaucoma type, unspecified laterality (Primary)          ED Disposition Condition    Discharge Stable        ED Prescriptions     None        Follow-up Information     Follow up With Specialties Details Why Contact Info    Renata Gomez MD Internal Medicine Schedule an appointment as soon as possible for a visit in 1 week For reevaluation [address]  [address], [address]  [facility] [address]  [phone]             Trent Gay, " MD  06/10/22 0179

## 2022-06-10 NOTE — ED NOTES
Assumed care of pt at this time with c/o eyes feeling hot and blurry since this morning, called physician and was told to come here, states light bothers eyes, light dimmed in room

## 2022-06-10 NOTE — TELEPHONE ENCOUNTER
----- Message from Blanca Hair sent at 6/10/2022  8:07 AM CDT -----  Regarding: Schedule  Patient called having to go to Er yesterday due having swollen eyes and sensitive to light. Pt also has questions about her eye drops.       Requesting Call back number:[phone]

## 2022-06-10 NOTE — FIRST PROVIDER EVALUATION
"Medical screening exam completed.  I have conducted a focused provider triage encounter, findings are as follows:    Brief history of present illness:  Eye pain (burning), photophobia, change in vision, itching    Vitals:    06/09/22 1911   BP: (!) 167/79   BP Location: Right arm   Patient Position: Sitting   Pulse: 90   Resp: 20   Temp: 98.7 °F (37.1 °C)   TempSrc: Oral   SpO2: 97%   Weight: 98.4 kg (217 lb)   Height: 5' 2" (1.575 m)       Pertinent physical exam:  Bilateral conjunctival erythema    Brief workup plan:  VA, staining    Preliminary workup initiated; this workup will be continued and followed by the physician or advanced practice provider that is assigned to the patient when roomed.  "

## 2022-06-13 ENCOUNTER — OFFICE VISIT (OUTPATIENT)
Dept: OPHTHALMOLOGY | Facility: CLINIC | Age: 67
End: 2022-06-13
Payer: MEDICARE

## 2022-06-13 DIAGNOSIS — H40.2233 CHRONIC ANGLE-CLOSURE GLAUCOMA OF BOTH EYES, SEVERE STAGE: ICD-10-CM

## 2022-06-13 DIAGNOSIS — M35.01 KERATOCONJUNCTIVITIS SICCA OF BOTH EYES: Primary | ICD-10-CM

## 2022-06-13 DIAGNOSIS — H25.813 COMBINED FORMS OF AGE-RELATED CATARACT OF BOTH EYES: ICD-10-CM

## 2022-06-13 PROCEDURE — 1160F PR REVIEW ALL MEDS BY PRESCRIBER/CLIN PHARMACIST DOCUMENTED: ICD-10-PCS | Mod: CPTII,S$GLB,, | Performed by: OPHTHALMOLOGY

## 2022-06-13 PROCEDURE — 1160F RVW MEDS BY RX/DR IN RCRD: CPT | Mod: CPTII,S$GLB,, | Performed by: OPHTHALMOLOGY

## 2022-06-13 PROCEDURE — 3288F PR FALLS RISK ASSESSMENT DOCUMENTED: ICD-10-PCS | Mod: CPTII,S$GLB,, | Performed by: OPHTHALMOLOGY

## 2022-06-13 PROCEDURE — 1101F PR PT FALLS ASSESS DOC 0-1 FALLS W/OUT INJ PAST YR: ICD-10-PCS | Mod: CPTII,S$GLB,, | Performed by: OPHTHALMOLOGY

## 2022-06-13 PROCEDURE — 99999 PR PBB SHADOW E&M-EST. PATIENT-LVL II: ICD-10-PCS | Mod: PBBFAC,,, | Performed by: OPHTHALMOLOGY

## 2022-06-13 PROCEDURE — 3044F PR MOST RECENT HEMOGLOBIN A1C LEVEL <7.0%: ICD-10-PCS | Mod: CPTII,S$GLB,, | Performed by: OPHTHALMOLOGY

## 2022-06-13 PROCEDURE — 92012 INTRM OPH EXAM EST PATIENT: CPT | Mod: S$GLB,,, | Performed by: OPHTHALMOLOGY

## 2022-06-13 PROCEDURE — 1125F PR PAIN SEVERITY QUANTIFIED, PAIN PRESENT: ICD-10-PCS | Mod: CPTII,S$GLB,, | Performed by: OPHTHALMOLOGY

## 2022-06-13 PROCEDURE — 92012 PR EYE EXAM, EST PATIENT,INTERMED: ICD-10-PCS | Mod: S$GLB,,, | Performed by: OPHTHALMOLOGY

## 2022-06-13 PROCEDURE — 1125F AMNT PAIN NOTED PAIN PRSNT: CPT | Mod: CPTII,S$GLB,, | Performed by: OPHTHALMOLOGY

## 2022-06-13 PROCEDURE — 1159F MED LIST DOCD IN RCRD: CPT | Mod: CPTII,S$GLB,, | Performed by: OPHTHALMOLOGY

## 2022-06-13 PROCEDURE — 1101F PT FALLS ASSESS-DOCD LE1/YR: CPT | Mod: CPTII,S$GLB,, | Performed by: OPHTHALMOLOGY

## 2022-06-13 PROCEDURE — 3044F HG A1C LEVEL LT 7.0%: CPT | Mod: CPTII,S$GLB,, | Performed by: OPHTHALMOLOGY

## 2022-06-13 PROCEDURE — 1159F PR MEDICATION LIST DOCUMENTED IN MEDICAL RECORD: ICD-10-PCS | Mod: CPTII,S$GLB,, | Performed by: OPHTHALMOLOGY

## 2022-06-13 PROCEDURE — 3288F FALL RISK ASSESSMENT DOCD: CPT | Mod: CPTII,S$GLB,, | Performed by: OPHTHALMOLOGY

## 2022-06-13 PROCEDURE — 99999 PR PBB SHADOW E&M-EST. PATIENT-LVL II: CPT | Mod: PBBFAC,,, | Performed by: OPHTHALMOLOGY

## 2022-06-13 NOTE — PROGRESS NOTES
"HPI     Eye Problem      Additional comments: Pt here for ED follow up for red itchy eyes with   light sensitivity              Comments     URGENT TODAY  -Pt here for ED follow up. Pt went to the ED on 6/9 for red itchy eyes   with FB sensation, tearing and discharge, and light sensitivity. Pt states   it felt like she had "rocks" in her eyes and also has to wear sunglasses    due to light. Pt states she uses AT's all day long with no relief.    1. Severe CACG OU  2. Phacomorphic Narrow Angles OU  3. NS OU  4. PRASHANTH  5. Graves Dz  6. Ptosis    MEDS:  Latanoprost QHS OU  Xiidra BID OU  Systane PRN OU          Last edited by Arabella Hirsch MA on 6/13/2022  1:13 PM. (History)            Assessment /Plan     For exam results, see Encounter Report.    Keratoconjunctivitis sicca of both eyes    Chronic angle-closure glaucoma of both eyes, severe stage    Combined forms of age-related cataract of both eyes      UCARE    PT C/O EYE PAIN - WENT TO ED OR URGENT CARE  They found high IOP at the urgent care - but this was likely an erroneous reading.    On exam today   Pt with extensive SPK OS > OD   IOP low 20's    rec   Pres free AT's about 6 x day   Gel drops or ointment at night   Cont latanoprost q hs (she does NOT think this is affecting her eye discomfort)     Consider punctal plugs   Consider doxy   Consider cornea consult     Has adv. ON changes OS > OD     Keep F/U in 2 weeks with Dr Macias - for consideration of phaco/IOL - should help open up angles //          "

## 2022-06-14 ENCOUNTER — TELEPHONE (OUTPATIENT)
Dept: ADMINISTRATIVE | Facility: CLINIC | Age: 67
End: 2022-06-14
Payer: MEDICARE

## 2022-06-15 ENCOUNTER — PATIENT OUTREACH (OUTPATIENT)
Dept: ADMINISTRATIVE | Facility: HOSPITAL | Age: 67
End: 2022-06-15
Payer: MEDICARE

## 2022-06-15 ENCOUNTER — TELEPHONE (OUTPATIENT)
Dept: ADMINISTRATIVE | Facility: HOSPITAL | Age: 67
End: 2022-06-15
Payer: MEDICARE

## 2022-07-14 ENCOUNTER — OFFICE VISIT (OUTPATIENT)
Dept: OPHTHALMOLOGY | Facility: CLINIC | Age: 67
End: 2022-07-14
Payer: MEDICARE

## 2022-07-14 ENCOUNTER — TELEPHONE (OUTPATIENT)
Dept: OPHTHALMOLOGY | Facility: CLINIC | Age: 67
End: 2022-07-14
Payer: MEDICARE

## 2022-07-14 DIAGNOSIS — G51.31 HEMIFACIAL SPASM OF RIGHT SIDE OF FACE: Primary | ICD-10-CM

## 2022-07-14 DIAGNOSIS — H40.2233 CHRONIC ANGLE-CLOSURE GLAUCOMA OF BOTH EYES, SEVERE STAGE: ICD-10-CM

## 2022-07-14 DIAGNOSIS — R25.8 SYNKINESIS OF RIGHT UPPER EYELID: ICD-10-CM

## 2022-07-14 DIAGNOSIS — R25.8: ICD-10-CM

## 2022-07-14 DIAGNOSIS — M35.01 KERATOCONJUNCTIVITIS SICCA OF BOTH EYES: ICD-10-CM

## 2022-07-14 DIAGNOSIS — E89.0 POSTABLATIVE HYPOTHYROIDISM: ICD-10-CM

## 2022-07-14 DIAGNOSIS — H02.403 PTOSIS, BILATERAL: ICD-10-CM

## 2022-07-14 PROCEDURE — 92285 EXTERNAL PHOTOGRAPHY - OU - BOTH EYES: ICD-10-PCS | Mod: S$GLB,,, | Performed by: OPHTHALMOLOGY

## 2022-07-14 PROCEDURE — 92083 EXTENDED VISUAL FIELD XM: CPT | Mod: S$GLB,,, | Performed by: OPHTHALMOLOGY

## 2022-07-14 PROCEDURE — 3288F FALL RISK ASSESSMENT DOCD: CPT | Mod: CPTII,S$GLB,, | Performed by: OPHTHALMOLOGY

## 2022-07-14 PROCEDURE — 3044F HG A1C LEVEL LT 7.0%: CPT | Mod: CPTII,S$GLB,, | Performed by: OPHTHALMOLOGY

## 2022-07-14 PROCEDURE — 1159F PR MEDICATION LIST DOCUMENTED IN MEDICAL RECORD: ICD-10-PCS | Mod: CPTII,S$GLB,, | Performed by: OPHTHALMOLOGY

## 2022-07-14 PROCEDURE — 99214 OFFICE O/P EST MOD 30 MIN: CPT | Mod: S$GLB,,, | Performed by: OPHTHALMOLOGY

## 2022-07-14 PROCEDURE — 1101F PR PT FALLS ASSESS DOC 0-1 FALLS W/OUT INJ PAST YR: ICD-10-PCS | Mod: CPTII,S$GLB,, | Performed by: OPHTHALMOLOGY

## 2022-07-14 PROCEDURE — 92083 GOLDMANN PERIMETRY - OU - EXTENDED - BOTH EYES: ICD-10-PCS | Mod: S$GLB,,, | Performed by: OPHTHALMOLOGY

## 2022-07-14 PROCEDURE — 99999 PR PBB SHADOW E&M-EST. PATIENT-LVL II: ICD-10-PCS | Mod: PBBFAC,,, | Performed by: OPHTHALMOLOGY

## 2022-07-14 PROCEDURE — 3044F PR MOST RECENT HEMOGLOBIN A1C LEVEL <7.0%: ICD-10-PCS | Mod: CPTII,S$GLB,, | Performed by: OPHTHALMOLOGY

## 2022-07-14 PROCEDURE — 2023F DILAT RTA XM W/O RTNOPTHY: CPT | Mod: CPTII,S$GLB,, | Performed by: OPHTHALMOLOGY

## 2022-07-14 PROCEDURE — 1159F MED LIST DOCD IN RCRD: CPT | Mod: CPTII,S$GLB,, | Performed by: OPHTHALMOLOGY

## 2022-07-14 PROCEDURE — 99999 PR PBB SHADOW E&M-EST. PATIENT-LVL II: CPT | Mod: PBBFAC,,, | Performed by: OPHTHALMOLOGY

## 2022-07-14 PROCEDURE — 2023F PR DILATED RETINAL EXAM W/O EVID OF RETINOPATHY: ICD-10-PCS | Mod: CPTII,S$GLB,, | Performed by: OPHTHALMOLOGY

## 2022-07-14 PROCEDURE — 1126F PR PAIN SEVERITY QUANTIFIED, NO PAIN PRESENT: ICD-10-PCS | Mod: CPTII,S$GLB,, | Performed by: OPHTHALMOLOGY

## 2022-07-14 PROCEDURE — 1160F PR REVIEW ALL MEDS BY PRESCRIBER/CLIN PHARMACIST DOCUMENTED: ICD-10-PCS | Mod: CPTII,S$GLB,, | Performed by: OPHTHALMOLOGY

## 2022-07-14 PROCEDURE — 1101F PT FALLS ASSESS-DOCD LE1/YR: CPT | Mod: CPTII,S$GLB,, | Performed by: OPHTHALMOLOGY

## 2022-07-14 PROCEDURE — 99214 PR OFFICE/OUTPT VISIT, EST, LEVL IV, 30-39 MIN: ICD-10-PCS | Mod: S$GLB,,, | Performed by: OPHTHALMOLOGY

## 2022-07-14 PROCEDURE — 92285 EXTERNAL OCULAR PHOTOGRAPHY: CPT | Mod: S$GLB,,, | Performed by: OPHTHALMOLOGY

## 2022-07-14 PROCEDURE — 1126F AMNT PAIN NOTED NONE PRSNT: CPT | Mod: CPTII,S$GLB,, | Performed by: OPHTHALMOLOGY

## 2022-07-14 PROCEDURE — 1160F RVW MEDS BY RX/DR IN RCRD: CPT | Mod: CPTII,S$GLB,, | Performed by: OPHTHALMOLOGY

## 2022-07-14 PROCEDURE — 3288F PR FALLS RISK ASSESSMENT DOCUMENTED: ICD-10-PCS | Mod: CPTII,S$GLB,, | Performed by: OPHTHALMOLOGY

## 2022-07-14 NOTE — PROGRESS NOTES
CRUZITO Bartlett is a/an 67 y.o. female here for ptosis.  Referred by: Cleo   How long have eyelid(s) been droopy? 2 years   Any h/o wearing hard CLs? no  Do eyelids feel heavy? no  Does the height of the eyelid(s) fluctuate throughout the day? Right eye   Do eyelid(s) interfere with daily activities such as driving, reading,   working? yes  Spontaneous orbital pain? yes  Orbital pain w eye movement? yes  Red eyes? yes  Red eyelids? no  Eyelid swelling? yes            Last edited by COSME Cameron on 7/14/2022  9:46 AM. (History)            Assessment /Plan     For exam results, see Encounter Report.    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  The patient has a history of Graves disease with a history of radioactive iodine and subsequent hypothyroidism.  She does have a history of bilateral chronic angle closure glaucoma.  Of note, the patient has noticed that the right eye has been more closed and has been spasming for approximately 2 years.  The patient remembers that she had history of right avascular necrosis and underwent hip replacement at that time.  Since that time, the patient has had this right-sided spasm along with right-sided ptosis.  She denies any history of Bell's palsy or cerebrovascular accident.    On exam, the patient has mild right brow descent compared to the left.  She has good frontalis elevation bilaterally.  She has right upper eyelid ptosis and right lower eyelid reverse ptosis with spasm.  She has mild left upper eyelid ptosis.  She has mild bilateral lower eyelid dermatochalasis and mild to moderate lateral canthal laxity.    Pt. With  significant improvement of superior visual fields with lids and brows taped vs. untaped.    These findings were discussed with the patient.    The patient has findings are consistent with right synkinesis of the right upper eyelid and right lower eyelid which are causing the decreased palpebral fissure of the right eye.    Recommend MRI Brain w and w/o contrast to rule out CPA tumor prior to proceeding with botulinum toxin.     Discussed option of botulinum toxin to the right upper eyelid and right lower eyelid and then re-evaluate.  The other option would be ptosis repair of the right upper eyelid to open up the fissure, but this would only work for a period of time as the spasm would continue.

## 2022-08-02 NOTE — PROGRESS NOTES
Subjective:       Patient ID: Carolee Bartlett is a 67 y.o. female.    Chief Complaint: Glaucoma       HPI     PT here for cataract eval     Taking Xiidra and PFAT and PRASHANTH symptoms have improved.     Meds;  Latanoprost QHS OU  Xiidra BID OU    Last edited by Soniya Macias MD on 8/3/2022  9:31 AM. 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 Michelle SENIORG severe OU  Phacomorphic narrowing - angle quite narrow OU  -Fhx (+Mother, +Brother), Steroids (),Trauma()  -Drops: Latanoprost qHS OU   -Drop intolerance/contraindication: -  -Laser:-  -Surgeries:-  -CCT: 605 // 640 thick  -Gonio: TM OU    Tm 21//23    Performed independent review of outside records including notes and tests  Discussed imaging and interpretation with patient.   Discussed eyedrops and if more than one, recommend 5 minutes between all drops.     Dr. Martinez started Latanoprost 2/21/22 --> IOP improved 15//18 --> OK for now    2/22 HVF SAD/IAD OU with high FN   2/22 RNFL Dec TI OD // Dec G/TS/TI B T OS     Continue present management with drops for now    Discussed may need cataract extraction to relieve angle closure and decrease further glaucoma risk      Combined forms of age-related cataract OU  CHRONIC ANGLE CLOSURE  Visually significant and interfering with activities of daily living including driving/reading  Patient desires cataract surgery for visual rehabilitation.     R/B/A discussed and patient agrees to proceed with surgery.   IOL options discussed according to patient's goals and concomitant ocular pathology; and patient content with monofocal lens.  Refractive target discussed at length. Patient opts for distance    CHRONIC ANGLE CLOSURE    LEFT EYE then RIGHT EYE    LEFT EYE DIBOO +21.00 target  -0.13  RIGHT EYE DIBOO +21.50 target -0.05    Consent signed    PRASHANTH OU  Started Xiidra 2/21/22  Pres free AT's about 6 x day   Gel drops or ointment at night   Consider punctal plugs / doxy       Graves disease  No overt E/o ELMER on exam   Monitor     Eyelid ptosis  Hemifacial spasm / synkinesis  Dr. Burden    PLAN  Cont Latanoprost  Cont AT and Xiidra      LEFT EYE then RIGHT EYE    LEFT EYE DIBOO +21.00 target -0.13  RIGHT EYE DIBOO +21.50 target -0.05    Soniya Macias M.D., M.S.  Department of Ophthalmology   Division of Glaucoma Surgery  [facility]

## 2022-08-03 ENCOUNTER — LAB VISIT (OUTPATIENT)
Dept: LAB | Facility: HOSPITAL | Age: 67
End: 2022-08-03
Attending: OPHTHALMOLOGY
Payer: MEDICARE

## 2022-08-03 ENCOUNTER — OFFICE VISIT (OUTPATIENT)
Dept: OPHTHALMOLOGY | Facility: CLINIC | Age: 67
End: 2022-08-03
Payer: MEDICARE

## 2022-08-03 DIAGNOSIS — H40.2233 CHRONIC ANGLE-CLOSURE GLAUCOMA OF BOTH EYES, SEVERE STAGE: Primary | ICD-10-CM

## 2022-08-03 DIAGNOSIS — M35.01 KERATOCONJUNCTIVITIS SICCA OF BOTH EYES: ICD-10-CM

## 2022-08-03 DIAGNOSIS — H25.813 COMBINED FORMS OF AGE-RELATED CATARACT OF BOTH EYES: ICD-10-CM

## 2022-08-03 DIAGNOSIS — H02.403 PTOSIS, BILATERAL: ICD-10-CM

## 2022-08-03 DIAGNOSIS — G51.31 HEMIFACIAL SPASM OF RIGHT SIDE OF FACE: ICD-10-CM

## 2022-08-03 LAB
CREAT SERPL-MCNC: 0.8 MG/DL (ref 0.5–1.4)
EST. GFR  (NO RACE VARIABLE): >60 ML/MIN/1.73 M^2

## 2022-08-03 PROCEDURE — 3044F HG A1C LEVEL LT 7.0%: CPT | Mod: CPTII,S$GLB,, | Performed by: STUDENT IN AN ORGANIZED HEALTH CARE EDUCATION/TRAINING PROGRAM

## 2022-08-03 PROCEDURE — 99999 PR PBB SHADOW E&M-EST. PATIENT-LVL II: CPT | Mod: PBBFAC,,, | Performed by: STUDENT IN AN ORGANIZED HEALTH CARE EDUCATION/TRAINING PROGRAM

## 2022-08-03 PROCEDURE — 1101F PT FALLS ASSESS-DOCD LE1/YR: CPT | Mod: CPTII,S$GLB,, | Performed by: STUDENT IN AN ORGANIZED HEALTH CARE EDUCATION/TRAINING PROGRAM

## 2022-08-03 PROCEDURE — 1159F MED LIST DOCD IN RCRD: CPT | Mod: CPTII,S$GLB,, | Performed by: STUDENT IN AN ORGANIZED HEALTH CARE EDUCATION/TRAINING PROGRAM

## 2022-08-03 PROCEDURE — 82565 ASSAY OF CREATININE: CPT | Performed by: OPHTHALMOLOGY

## 2022-08-03 PROCEDURE — 99499 UNLISTED E&M SERVICE: CPT | Mod: S$GLB,,, | Performed by: STUDENT IN AN ORGANIZED HEALTH CARE EDUCATION/TRAINING PROGRAM

## 2022-08-03 PROCEDURE — 1160F PR REVIEW ALL MEDS BY PRESCRIBER/CLIN PHARMACIST DOCUMENTED: ICD-10-PCS | Mod: CPTII,S$GLB,, | Performed by: STUDENT IN AN ORGANIZED HEALTH CARE EDUCATION/TRAINING PROGRAM

## 2022-08-03 PROCEDURE — 36415 COLL VENOUS BLD VENIPUNCTURE: CPT | Performed by: OPHTHALMOLOGY

## 2022-08-03 PROCEDURE — 1160F RVW MEDS BY RX/DR IN RCRD: CPT | Mod: CPTII,S$GLB,, | Performed by: STUDENT IN AN ORGANIZED HEALTH CARE EDUCATION/TRAINING PROGRAM

## 2022-08-03 PROCEDURE — 99499 RISK ADDL DX/OHS AUDIT: ICD-10-PCS | Mod: S$GLB,,, | Performed by: STUDENT IN AN ORGANIZED HEALTH CARE EDUCATION/TRAINING PROGRAM

## 2022-08-03 PROCEDURE — 99214 OFFICE O/P EST MOD 30 MIN: CPT | Mod: S$GLB,,, | Performed by: STUDENT IN AN ORGANIZED HEALTH CARE EDUCATION/TRAINING PROGRAM

## 2022-08-03 PROCEDURE — 3288F PR FALLS RISK ASSESSMENT DOCUMENTED: ICD-10-PCS | Mod: CPTII,S$GLB,, | Performed by: STUDENT IN AN ORGANIZED HEALTH CARE EDUCATION/TRAINING PROGRAM

## 2022-08-03 PROCEDURE — 92136 IOL MASTER - OS - LEFT EYE: ICD-10-PCS | Mod: LT,S$GLB,, | Performed by: STUDENT IN AN ORGANIZED HEALTH CARE EDUCATION/TRAINING PROGRAM

## 2022-08-03 PROCEDURE — 92136 OPHTHALMIC BIOMETRY: CPT | Mod: LT,S$GLB,, | Performed by: STUDENT IN AN ORGANIZED HEALTH CARE EDUCATION/TRAINING PROGRAM

## 2022-08-03 PROCEDURE — 1159F PR MEDICATION LIST DOCUMENTED IN MEDICAL RECORD: ICD-10-PCS | Mod: CPTII,S$GLB,, | Performed by: STUDENT IN AN ORGANIZED HEALTH CARE EDUCATION/TRAINING PROGRAM

## 2022-08-03 PROCEDURE — 99214 PR OFFICE/OUTPT VISIT, EST, LEVL IV, 30-39 MIN: ICD-10-PCS | Mod: S$GLB,,, | Performed by: STUDENT IN AN ORGANIZED HEALTH CARE EDUCATION/TRAINING PROGRAM

## 2022-08-03 PROCEDURE — 1101F PR PT FALLS ASSESS DOC 0-1 FALLS W/OUT INJ PAST YR: ICD-10-PCS | Mod: CPTII,S$GLB,, | Performed by: STUDENT IN AN ORGANIZED HEALTH CARE EDUCATION/TRAINING PROGRAM

## 2022-08-03 PROCEDURE — 1125F PR PAIN SEVERITY QUANTIFIED, PAIN PRESENT: ICD-10-PCS | Mod: CPTII,S$GLB,, | Performed by: STUDENT IN AN ORGANIZED HEALTH CARE EDUCATION/TRAINING PROGRAM

## 2022-08-03 PROCEDURE — 3044F PR MOST RECENT HEMOGLOBIN A1C LEVEL <7.0%: ICD-10-PCS | Mod: CPTII,S$GLB,, | Performed by: STUDENT IN AN ORGANIZED HEALTH CARE EDUCATION/TRAINING PROGRAM

## 2022-08-03 PROCEDURE — 3288F FALL RISK ASSESSMENT DOCD: CPT | Mod: CPTII,S$GLB,, | Performed by: STUDENT IN AN ORGANIZED HEALTH CARE EDUCATION/TRAINING PROGRAM

## 2022-08-03 PROCEDURE — 99999 PR PBB SHADOW E&M-EST. PATIENT-LVL II: ICD-10-PCS | Mod: PBBFAC,,, | Performed by: STUDENT IN AN ORGANIZED HEALTH CARE EDUCATION/TRAINING PROGRAM

## 2022-08-03 PROCEDURE — 1125F AMNT PAIN NOTED PAIN PRSNT: CPT | Mod: CPTII,S$GLB,, | Performed by: STUDENT IN AN ORGANIZED HEALTH CARE EDUCATION/TRAINING PROGRAM

## 2022-08-05 ENCOUNTER — TELEPHONE (OUTPATIENT)
Dept: OPHTHALMOLOGY | Facility: CLINIC | Age: 67
End: 2022-08-05
Payer: MEDICARE

## 2022-08-05 DIAGNOSIS — H25.813 COMBINED FORMS OF AGE-RELATED CATARACT OF BOTH EYES: Primary | ICD-10-CM

## 2022-08-11 ENCOUNTER — HOSPITAL ENCOUNTER (OUTPATIENT)
Dept: RADIOLOGY | Facility: HOSPITAL | Age: 67
Discharge: HOME | End: 2022-08-11
Attending: OPHTHALMOLOGY
Payer: MEDICARE

## 2022-08-11 DIAGNOSIS — G51.31 HEMIFACIAL SPASM OF RIGHT SIDE OF FACE: ICD-10-CM

## 2022-08-11 PROCEDURE — 70553 MRI BRAIN STEM W/O & W/DYE: CPT | Mod: 26,,, | Performed by: RADIOLOGY

## 2022-08-11 PROCEDURE — 70553 MRI BRAIN W WO CONTRAST: ICD-10-PCS | Mod: 26,,, | Performed by: RADIOLOGY

## 2022-08-11 PROCEDURE — 25500020 PHARM REV CODE 255: Performed by: OPHTHALMOLOGY

## 2022-08-11 PROCEDURE — A9585 GADOBUTROL INJECTION: HCPCS | Performed by: OPHTHALMOLOGY

## 2022-08-11 PROCEDURE — 70553 MRI BRAIN STEM W/O & W/DYE: CPT | Mod: TC

## 2022-08-11 RX ORDER — GADOBUTROL 604.72 MG/ML
10 INJECTION INTRAVENOUS
Status: COMPLETED | OUTPATIENT
Start: 2022-08-11 | End: 2022-08-11

## 2022-08-11 RX ADMIN — GADOBUTROL 10 ML: 604.72 INJECTION INTRAVENOUS at 01:08

## 2022-08-18 ENCOUNTER — TELEPHONE (OUTPATIENT)
Dept: OPHTHALMOLOGY | Facility: CLINIC | Age: 67
End: 2022-08-18
Payer: MEDICARE

## 2022-08-31 DIAGNOSIS — Z78.0 MENOPAUSE: ICD-10-CM

## 2022-09-06 NOTE — H&P
Ophthalmology Preoperative History and Physical Exam     CC/Reason for Surgery: Visually significant combined forms of age-related cataract left eye    HPI:   Pt presents c/o progressive decrease in vision, blurred vision, difficulty reading in dim light, trouble driving at night and significant glare and halos around lights at night.      Past Medical History:  Past Medical History:   Diagnosis Date    Avascular necrosis     Cataract     Diabetes mellitus     Encounter for blood transfusion     Glaucoma     Graves disease     Hypertension     Other abnormal glucose     Pre-Diabetic    Sciatica         Past Surgical History:  Past Surgical History:   Procedure Laterality Date    HIP ARTHROPLASTY Right 11/26/2018    Procedure: ARTHROPLASTY, HIP, Noe, peg board, first assist;  Surgeon: Olayinka Redding MD;  Location: [facility];  Service: Orthopedics;  Laterality: Right;  NOE ORTHO CONFIRMED 11/20/DME FIRST ASSISTANT CONFIRMED 11/21/DME    TUBAL LIGATION          Past Ocular History:  CATARACTS OU    Allergies:  Review of patient's allergies indicates:  No Known Allergies     Social History:  Social History     Socioeconomic History    Marital status:     Number of children: 1   Occupational History     Comment: volunteer at the school    Tobacco Use    Smoking status: Some Days     Packs/day: 0.25     Types: Cigarettes    Smokeless tobacco: Never   Substance and Sexual Activity    Alcohol use: No    Drug use: No        Medications:  No current facility-administered medications for this encounter.    Current Outpatient Medications:     atorvastatin (LIPITOR) 10 MG tablet, Take 1 tablet (10 mg total) by mouth once daily., Disp: 90 tablet, Rfl: 3    latanoprost 0.005 % ophthalmic solution, INSTILL 1 DROP INTO BOTH EYES EVERY EVENING, Disp: 2.5 mL, Rfl: 3    levothyroxine (SYNTHROID) 100 MCG tablet, Take 1 tablet (100 mcg total) by mouth before breakfast., Disp: 90 tablet, Rfl: 3    lifitegrast  (XIIDRA) 5 % Dpet, Place 1 drop into both eyes 2 (two) times a day., Disp: 60 each, Rfl: 11    losartan-hydrochlorothiazide 50-12.5 mg (HYZAAR) 50-12.5 mg per tablet, Take 1 tablet by mouth once daily., Disp: 90 tablet, Rfl: 3    metFORMIN (GLUCOPHAGE) 500 MG tablet, PLEASE SEE ATTACHED FOR DETAILED DIRECTIONS, Disp: 180 tablet, Rfl: 0     Family History:  Family History   Problem Relation Age of Onset    Kidney failure Mother     Hypertension Mother         was on list to have kidney, lung, and heart transplant    Glaucoma Mother     Blindness Mother     Diabetes Mother     Stomach cancer Father     Breast cancer Sister 33        double mastectomy    Diabetes Sister     Heart attack Brother 40    Glaucoma Brother     Amblyopia Neg Hx     Cataracts Neg Hx     Macular degeneration Neg Hx     Retinal detachment Neg Hx     Strabismus Neg Hx         ROS:   Constitutional: WNL   Eyes: See HPI   Ears: WNL   CV: WNL   Resp: WNL   Gastro: WNL    Musculo: WNL   Skin: WNL   Neuro: WNL     Physical Exam:  See nursing intake for vitals    General: No acute distress  HEENT: NC/AT  CV: Pulses strong and equal bilaterally  Resp: Breathing comfortably on room air  Musculoskeletal: WNL, able to lay flat    Lab Results:  CBC w/Diff   Lab Results   Component Value Date/Time    WBC 7.62 12/27/2021 08:37 AM    RBC 4.88 12/27/2021 08:37 AM    HGB 12.1 12/27/2021 08:37 AM    HCT 40.4 12/27/2021 08:37 AM    MCV 83 12/27/2021 08:37 AM    MCH 24.8 (L) 12/27/2021 08:37 AM    MCHC 30.0 (L) 12/27/2021 08:37 AM    RDW 15.2 (H) 12/27/2021 08:37 AM    MPV 10.6 12/27/2021 08:37 AM    No components found for: NEUT, ANC, LYMA, ALC, VINEET, AMC, EOSA, AEC, BASA, ABC     Imaging:  None    Assessment:  1: Visually significant combined forms of age-related cataract LEFT eye    Plan:  To operating room for Cataract Extraction with Intraocular Lens Placement LEFT Eye    An extensive discussion took place with the patient concerning the risks, benefits and  alternatives to the above procedure. The patient was given the opportunity to have all questions answered. At the conclusion of our discussion, signed informed consent was obtained.     Soniya Macias M.D., M.S.  Department of Ophthalmology   Division of Glaucoma Surgery  [facility]

## 2022-09-07 ENCOUNTER — TELEPHONE (OUTPATIENT)
Dept: OPHTHALMOLOGY | Facility: CLINIC | Age: 67
End: 2022-09-07
Payer: MEDICARE

## 2022-09-07 NOTE — PRE-PROCEDURE INSTRUCTIONS
PreOp Instructions given:   - Verbal medication information (what to hold and what to take)   - NPO guidelines   - Arrival place directions given; time to be given the day before procedure by the   Surgeon's Office 0900  - Bathing with antibacterial soap   - Don't wear any jewelry or bring any valuables AM of surgery   - No makeup or moisturizer to face   - No perfume/cologne, powder, lotions or aftershave   Pt. verbalized understanding.   Pt denies any h/o Anesthesia/Sedation complications or side effects.  Pt aware that instructions apply to second eye surgery to be scheduled. Pt verbalized an understanding.

## 2022-09-08 ENCOUNTER — ANESTHESIA EVENT (OUTPATIENT)
Dept: SURGERY | Facility: HOSPITAL | Age: 67
End: 2022-09-08
Payer: MEDICARE

## 2022-09-08 ENCOUNTER — HOSPITAL ENCOUNTER (OUTPATIENT)
Facility: HOSPITAL | Age: 67
Discharge: HOME | End: 2022-09-08
Attending: STUDENT IN AN ORGANIZED HEALTH CARE EDUCATION/TRAINING PROGRAM | Admitting: STUDENT IN AN ORGANIZED HEALTH CARE EDUCATION/TRAINING PROGRAM
Payer: MEDICARE

## 2022-09-08 ENCOUNTER — ANESTHESIA (OUTPATIENT)
Dept: SURGERY | Facility: HOSPITAL | Age: 67
End: 2022-09-08
Payer: MEDICARE

## 2022-09-08 VITALS
TEMPERATURE: 99 F | RESPIRATION RATE: 18 BRPM | HEART RATE: 59 BPM | OXYGEN SATURATION: 98 % | DIASTOLIC BLOOD PRESSURE: 62 MMHG | HEIGHT: 62 IN | BODY MASS INDEX: 36.8 KG/M2 | WEIGHT: 200 LBS | SYSTOLIC BLOOD PRESSURE: 132 MMHG

## 2022-09-08 DIAGNOSIS — H25.812 COMBINED FORM OF AGE-RELATED CATARACT, LEFT EYE: ICD-10-CM

## 2022-09-08 DIAGNOSIS — H25.812 COMBINED FORMS OF AGE-RELATED CATARACT OF LEFT EYE: Primary | ICD-10-CM

## 2022-09-08 LAB — POCT GLUCOSE: 93 MG/DL (ref 70–110)

## 2022-09-08 PROCEDURE — 37000008 HC ANESTHESIA 1ST 15 MINUTES: Performed by: STUDENT IN AN ORGANIZED HEALTH CARE EDUCATION/TRAINING PROGRAM

## 2022-09-08 PROCEDURE — 71000044 HC DOSC ROUTINE RECOVERY FIRST HOUR: Performed by: STUDENT IN AN ORGANIZED HEALTH CARE EDUCATION/TRAINING PROGRAM

## 2022-09-08 PROCEDURE — 00142 ANES PX ON EYE LENS SURGERY: CPT | Performed by: STUDENT IN AN ORGANIZED HEALTH CARE EDUCATION/TRAINING PROGRAM

## 2022-09-08 PROCEDURE — D9220A PRA ANESTHESIA: Mod: CRNA,,, | Performed by: NURSE ANESTHETIST, CERTIFIED REGISTERED

## 2022-09-08 PROCEDURE — 66984 XCAPSL CTRC RMVL W/O ECP: CPT | Mod: LT,,, | Performed by: STUDENT IN AN ORGANIZED HEALTH CARE EDUCATION/TRAINING PROGRAM

## 2022-09-08 PROCEDURE — D9220A PRA ANESTHESIA: ICD-10-PCS | Mod: CRNA,,, | Performed by: NURSE ANESTHETIST, CERTIFIED REGISTERED

## 2022-09-08 PROCEDURE — 36000707: Performed by: STUDENT IN AN ORGANIZED HEALTH CARE EDUCATION/TRAINING PROGRAM

## 2022-09-08 PROCEDURE — 25000003 PHARM REV CODE 250: Performed by: STUDENT IN AN ORGANIZED HEALTH CARE EDUCATION/TRAINING PROGRAM

## 2022-09-08 PROCEDURE — 66984 PR REMOVAL, CATARACT, W/INSRT INTRAOC LENS, W/O ENDO CYCLO: ICD-10-PCS | Mod: LT,,, | Performed by: STUDENT IN AN ORGANIZED HEALTH CARE EDUCATION/TRAINING PROGRAM

## 2022-09-08 PROCEDURE — V2632 POST CHMBR INTRAOCULAR LENS: HCPCS | Performed by: STUDENT IN AN ORGANIZED HEALTH CARE EDUCATION/TRAINING PROGRAM

## 2022-09-08 PROCEDURE — D9220A PRA ANESTHESIA: ICD-10-PCS | Mod: ANES,,, | Performed by: STUDENT IN AN ORGANIZED HEALTH CARE EDUCATION/TRAINING PROGRAM

## 2022-09-08 PROCEDURE — D9220A PRA ANESTHESIA: Mod: ANES,,, | Performed by: STUDENT IN AN ORGANIZED HEALTH CARE EDUCATION/TRAINING PROGRAM

## 2022-09-08 PROCEDURE — 37000009 HC ANESTHESIA EA ADD 15 MINS: Performed by: STUDENT IN AN ORGANIZED HEALTH CARE EDUCATION/TRAINING PROGRAM

## 2022-09-08 PROCEDURE — 71000015 HC POSTOP RECOV 1ST HR: Performed by: STUDENT IN AN ORGANIZED HEALTH CARE EDUCATION/TRAINING PROGRAM

## 2022-09-08 PROCEDURE — 82962 GLUCOSE BLOOD TEST: CPT | Performed by: STUDENT IN AN ORGANIZED HEALTH CARE EDUCATION/TRAINING PROGRAM

## 2022-09-08 PROCEDURE — 25000003 PHARM REV CODE 250: Performed by: NURSE ANESTHETIST, CERTIFIED REGISTERED

## 2022-09-08 PROCEDURE — 36000706: Performed by: STUDENT IN AN ORGANIZED HEALTH CARE EDUCATION/TRAINING PROGRAM

## 2022-09-08 PROCEDURE — 25000003 PHARM REV CODE 250

## 2022-09-08 PROCEDURE — 63600175 PHARM REV CODE 636 W HCPCS: Performed by: NURSE ANESTHETIST, CERTIFIED REGISTERED

## 2022-09-08 DEVICE — LENS EYHANCE +21.5D: Type: IMPLANTABLE DEVICE | Site: EYE | Status: FUNCTIONAL

## 2022-09-08 RX ORDER — PROPARACAINE HYDROCHLORIDE 5 MG/ML
1 SOLUTION/ DROPS OPHTHALMIC
Status: DISCONTINUED | OUTPATIENT
Start: 2022-09-08 | End: 2023-06-07

## 2022-09-08 RX ORDER — PREDNISOLONE ACETATE 10 MG/ML
1 SUSPENSION/ DROPS OPHTHALMIC 4 TIMES DAILY
Qty: 5 ML | Refills: 0
Start: 2022-09-08 | End: 2022-09-27

## 2022-09-08 RX ORDER — LIDOCAINE HYDROCHLORIDE 20 MG/ML
INJECTION, SOLUTION EPIDURAL; INFILTRATION; INTRACAUDAL; PERINEURAL
Status: DISCONTINUED
Start: 2022-09-08 | End: 2022-09-08 | Stop reason: HOSPADM

## 2022-09-08 RX ORDER — TROPICAMIDE 10 MG/ML
1 SOLUTION/ DROPS OPHTHALMIC
Status: DISCONTINUED | OUTPATIENT
Start: 2022-09-08 | End: 2023-06-07

## 2022-09-08 RX ORDER — MOXIFLOXACIN 5 MG/ML
1 SOLUTION/ DROPS OPHTHALMIC
Status: DISCONTINUED | OUTPATIENT
Start: 2022-09-08 | End: 2023-06-07

## 2022-09-08 RX ORDER — SODIUM CHLORIDE 0.9 % (FLUSH) 0.9 %
10 SYRINGE (ML) INJECTION
Status: DISCONTINUED | OUTPATIENT
Start: 2022-09-08 | End: 2023-06-07

## 2022-09-08 RX ORDER — KETOROLAC TROMETHAMINE 5 MG/ML
1 SOLUTION OPHTHALMIC 4 TIMES DAILY
Qty: 5 ML | Refills: 0
Start: 2022-09-08 | End: 2022-09-27

## 2022-09-08 RX ORDER — SODIUM CHLORIDE 0.9 % (FLUSH) 0.9 %
3 SYRINGE (ML) INJECTION EVERY 4 HOURS PRN
Status: CANCELLED | OUTPATIENT
Start: 2022-09-08

## 2022-09-08 RX ORDER — MIDAZOLAM HYDROCHLORIDE 1 MG/ML
INJECTION, SOLUTION INTRAMUSCULAR; INTRAVENOUS
Status: DISCONTINUED | OUTPATIENT
Start: 2022-09-08 | End: 2022-09-08

## 2022-09-08 RX ORDER — KETOROLAC TROMETHAMINE 5 MG/ML
1 SOLUTION OPHTHALMIC
Status: DISCONTINUED | OUTPATIENT
Start: 2022-09-08 | End: 2023-06-07

## 2022-09-08 RX ORDER — PREDNISOLONE ACETATE 10 MG/ML
SUSPENSION/ DROPS OPHTHALMIC
Status: DISCONTINUED
Start: 2022-09-08 | End: 2022-09-08 | Stop reason: HOSPADM

## 2022-09-08 RX ORDER — MOXIFLOXACIN 5 MG/ML
SOLUTION/ DROPS OPHTHALMIC
Status: DISCONTINUED | OUTPATIENT
Start: 2022-09-08 | End: 2022-09-08 | Stop reason: HOSPADM

## 2022-09-08 RX ORDER — FENTANYL CITRATE 50 UG/ML
INJECTION, SOLUTION INTRAMUSCULAR; INTRAVENOUS
Status: DISCONTINUED | OUTPATIENT
Start: 2022-09-08 | End: 2022-09-08

## 2022-09-08 RX ORDER — MOXIFLOXACIN 5 MG/ML
1 SOLUTION/ DROPS OPHTHALMIC 4 TIMES DAILY
Qty: 3 ML | Refills: 0
Start: 2022-09-08 | End: 2022-09-18

## 2022-09-08 RX ORDER — LIDOCAINE HYDROCHLORIDE 10 MG/ML
INJECTION, SOLUTION EPIDURAL; INFILTRATION; INTRACAUDAL; PERINEURAL
Status: DISCONTINUED | OUTPATIENT
Start: 2022-09-08 | End: 2022-09-08 | Stop reason: HOSPADM

## 2022-09-08 RX ORDER — FENTANYL CITRATE 50 UG/ML
25 INJECTION, SOLUTION INTRAMUSCULAR; INTRAVENOUS EVERY 5 MIN PRN
Status: CANCELLED | OUTPATIENT
Start: 2022-09-08

## 2022-09-08 RX ORDER — LIDOCAINE HYDROCHLORIDE 20 MG/ML
INJECTION, SOLUTION EPIDURAL; INFILTRATION; INTRACAUDAL; PERINEURAL
Status: DISCONTINUED | OUTPATIENT
Start: 2022-09-08 | End: 2022-09-08 | Stop reason: HOSPADM

## 2022-09-08 RX ORDER — PHENYLEPHRINE HYDROCHLORIDE 25 MG/ML
1 SOLUTION/ DROPS OPHTHALMIC
Status: DISCONTINUED | OUTPATIENT
Start: 2022-09-08 | End: 2023-06-07

## 2022-09-08 RX ORDER — PREDNISOLONE ACETATE 10 MG/ML
SUSPENSION/ DROPS OPHTHALMIC
Status: DISCONTINUED | OUTPATIENT
Start: 2022-09-08 | End: 2022-09-08 | Stop reason: HOSPADM

## 2022-09-08 RX ORDER — LIDOCAINE HYDROCHLORIDE 10 MG/ML
INJECTION, SOLUTION EPIDURAL; INFILTRATION; INTRACAUDAL; PERINEURAL
Status: DISCONTINUED
Start: 2022-09-08 | End: 2022-09-08 | Stop reason: HOSPADM

## 2022-09-08 RX ADMIN — TROPICAMIDE 1 DROP: 10 SOLUTION/ DROPS OPHTHALMIC at 09:09

## 2022-09-08 RX ADMIN — MOXIFLOXACIN 1 DROP: 5 SOLUTION/ DROPS OPHTHALMIC at 09:09

## 2022-09-08 RX ADMIN — KETOROLAC TROMETHAMINE 1 DROP: 5 SOLUTION/ DROPS OPHTHALMIC at 09:09

## 2022-09-08 RX ADMIN — PHENYLEPHRINE HYDROCHLORIDE 1 DROP: 25 SOLUTION/ DROPS OPHTHALMIC at 09:09

## 2022-09-08 RX ADMIN — PROPARACAINE HYDROCHLORIDE 1 DROP: 5 SOLUTION/ DROPS OPHTHALMIC at 09:09

## 2022-09-08 RX ADMIN — MIDAZOLAM HYDROCHLORIDE 1 MG: 1 INJECTION, SOLUTION INTRAMUSCULAR; INTRAVENOUS at 10:09

## 2022-09-08 RX ADMIN — FENTANYL CITRATE 25 MCG: 50 INJECTION, SOLUTION INTRAMUSCULAR; INTRAVENOUS at 10:09

## 2022-09-08 RX ADMIN — SODIUM CHLORIDE: 0.9 INJECTION, SOLUTION INTRAVENOUS at 10:09

## 2022-09-08 NOTE — ANESTHESIA PREPROCEDURE EVALUATION
Pre-operative evaluation for Procedure(s) (LRB):  PHACOEMULSIFICATION, CATARACT (Left)  INSERTION, IOL PROSTHESIS (Left)    Carolee Bartlett is a 67 y.o. female with pmh of HTN, avascular necrosis of the R femur (s/p arthroplasty) who presents with a cataract on the L. Plan for the above procedure.     Patient Active Problem List   Diagnosis    Avascular necrosis of right femur    S/P total hip arthroplasty    Postablative hypothyroidism    Impaired glucose tolerance    Hyperlipidemia    Hypertension    Muscle cramps    Glaucoma        No current facility-administered medications on file prior to encounter.     Current Outpatient Medications on File Prior to Encounter   Medication Sig Dispense Refill    latanoprost 0.005 % ophthalmic solution INSTILL 1 DROP INTO BOTH EYES EVERY EVENING 2.5 mL 3    levothyroxine (SYNTHROID) 100 MCG tablet Take 1 tablet (100 mcg total) by mouth before breakfast. 90 tablet 3    losartan-hydrochlorothiazide 50-12.5 mg (HYZAAR) 50-12.5 mg per tablet Take 1 tablet by mouth once daily. 90 tablet 3    metFORMIN (GLUCOPHAGE) 500 MG tablet PLEASE SEE ATTACHED FOR DETAILED DIRECTIONS (Patient taking differently: Take 500 mg by mouth 2 (two) times daily with meals.) 180 tablet 0    atorvastatin (LIPITOR) 10 MG tablet Take 1 tablet (10 mg total) by mouth once daily. (Patient taking differently: Take 10 mg by mouth every evening.) 90 tablet 3    lifitegrast (XIIDRA) 5 % Dpet Place 1 drop into both eyes 2 (two) times a day. 60 each 11       Past Surgical History:   Procedure Laterality Date    HIP ARTHROPLASTY Right 11/26/2018    Procedure: ARTHROPLASTY, HIP, Noe, peg board, first assist;  Surgeon: Olayinka Redding MD;  Location: [facility];  Service: Orthopedics;  Laterality: Right;  NOE ORTHO CONFIRMED 11/20/DME FIRST ASSISTANT CONFIRMED 11/21/DME    TUBAL LIGATION            Pre-op Assessment    I have reviewed the Patient Summary Reports.     I have reviewed the Nursing Notes.    I have reviewed the Medications.     Review of Systems  Anesthesia Hx:  Denies Hx of Anesthetic complications  History of prior surgery of interest to airway management or planning: Denies Family Hx of Anesthesia complications.   Denies Personal Hx of Anesthesia complications.   Social:  Smoker    Hematology/Oncology:  Hematology Normal   Oncology Normal     EENT/Dental:EENT/Dental Normal   Cardiovascular:   Exercise tolerance: good Hypertension  Functional Capacity good / => 4 METS   Denies Congenital Heart Disease.    Denies Congenital Heart Disease.   Denies Deep Venous Thrombosis (DVT)    Pulmonary:  Pulmonary Normal  Denies Pulmonary Symptoms.    Renal/:  Renal/ Normal   Denies Renal Symptoms/Infections/Stones    Hepatic/GI:  Hepatic/GI Normal  Denies Liver Disease    Musculoskeletal:  Musculoskeletal Normal  Denies Musculoskeletal General/Symptoms  Denies Muscle Disorders  Denies Joint Disease   Denies Rheumatic Disease  Denies Cervical Spine Disorder    Neurological:  Denies Dx of Headaches Denies Seizure Disorder  Denies TIA - Transient Ischemic Attack    Endocrine:   Hyperthyroidism  Denies Diabetes  Denies Thyroid Disease    Psych:  Psychiatric Normal           Physical Exam  General: Well nourished, Cooperative, Alert and Oriented    Airway:  Mouth Opening: Normal  TM Distance: Normal  Tongue: Normal  Neck ROM: Normal ROM    Chest/Lungs:  Clear to auscultation, Normal Respiratory Rate    Heart:  Rate: Normal  Rhythm: Regular Rhythm  Sounds: Normal        Anesthesia Plan  Type of Anesthesia, risks & benefits discussed:    Anesthesia Type: MAC, Gen Natural Airway, Gen ETT  Intra-op Monitoring Plan: Standard ASA Monitors  Post Op Pain Control Plan: multimodal analgesia  Induction:  IV  Informed Consent: Informed consent signed with the Patient and all parties understand the risks and agree  with anesthesia plan.  All questions answered.   ASA Score: 2  Day of Surgery Review of History & Physical: H&P Update referred to the surgeon/provider.    Ready For Surgery From Anesthesia Perspective.     .

## 2022-09-08 NOTE — DISCHARGE SUMMARY
[facility] - Surgery (1st Fl)  Discharge Note  Short Stay    Procedure(s) (LRB):  PHACOEMULSIFICATION, CATARACT (Left)  INSERTION, IOL PROSTHESIS (Left)    OUTCOME: Patient tolerated treatment/procedure well without complication and is now ready for discharge.    DISPOSITION: Home or Self Care    FINAL DIAGNOSIS:  Combined form of age-related cataract, left eye    FOLLOWUP: In clinic    DISCHARGE INSTRUCTIONS:  No discharge procedures on file.     TIME SPENT ON DISCHARGE:   10 minutes

## 2022-09-08 NOTE — TRANSFER OF CARE
"Anesthesia Transfer of Care Note    Patient: Carolee Bartlett    Procedure(s) Performed: Procedure(s) (LRB):  PHACOEMULSIFICATION, CATARACT (Left)  INSERTION, IOL PROSTHESIS (Left)    Patient location: PACU    Anesthesia Type: MAC    Transport from OR: Transported from OR on room air with adequate spontaneous ventilation    Post pain: adequate analgesia    Post assessment: no apparent anesthetic complications and tolerated procedure well    Post vital signs: stable    Level of consciousness: alert and awake    Nausea/Vomiting: no nausea/vomiting    Complications: none    Transfer of care protocol was followed      Last vitals:   Visit Vitals  BP (!) 162/72 (BP Location: Left arm, Patient Position: Lying)   Pulse 66   Temp 37.1 °C (98.8 °F) (Oral)   Resp 16   Ht 5' 2" (1.575 m)   Wt 90.7 kg (200 lb)   SpO2 99%   Breastfeeding No   BMI 36.58 kg/m²     "

## 2022-09-08 NOTE — PATIENT INSTRUCTIONS
Dr. Soniya Macias MD                                   [facility]  Cataract Surgery Post-Operative Instructions                   SURGERY EYE ONLY  Prednisolone acetate (PINK TOP):   SHAKE then one drop 4 TIMES A DAY.  Get new directions next visit     Moxifloxacin (TAN TOP, YELLOW LIQUID):   One drop 4 TIMES A DAY.   Use until the bottle is empty      Ketorolac (GREY TOP):       One drop, 4 TIMES A DAY.   Use until the bottle is empty           Other drops:   ----------------------------------------------------------------------------------------------------  No bending, straining, or lifting more than 10 pounds FOR 1 WEEK  You can shower, but do not get water in the eye  Wear the plastic eye patch when you are sleeping including during naps  Call the clinic if any of the following problems: vision getting a lot worse, bad pain, green pus or discharge from the eye  [phone] or  [phone]

## 2022-09-08 NOTE — PLAN OF CARE
D/C instructions given to pt and family. Pt. Tolerating po fluids and denies pain and n/v at this time. IV dc'd. VSS. Family at bs for d/c. All consents and AVS in chart at time of discharge.

## 2022-09-08 NOTE — OP NOTE
Operative Date:  09/08/2022    Discharge Date:  09/08/2022    Discharge Patient Home    Report Title: Operative Note      SURGEON: Soniya Macias MD MS    ASSISTANT: Susie Nieves MD    PREOPERATIVE DIAGNOSIS: Visually significant combined forms of age-related cataract,  Left Eye.    POSTOPERATIVE DIAGNOSIS: Visually-significant combined forms of age-related cataract,  Left Eye.    PROCEDURE PERFORMED: Phacoemulsification of the cataract with posterior chamber intraocular lens Left Eye.    ANESTHESIA: Topical with MAC    COMPLICATIONS: None    ESTIMATED BLOOD LOSS: Minimal    INDICATIONS FOR PROCEDURE:   The patient is a pleasant 67 year old with increasing difficulties with activities of daily living secondary to a dense visually significant cataract in the Left Eye.  Discussions have been carried out with this patient concerning the options to surgery, risks, benefits and expectations.  The patient voiced good understanding and wished to proceed with the above procedure.    PROCEDURE IN DETAIL: The patient was brought to the operating room and received topical anesthetic to the eye and then was prepped and draped in the usual sterile fashion.  The Left Eye was first entered at the 1:30 oclock paracentesis site, followed by intracameral lidocaine and Viscoat material.  The eye was then reentered at the primary surgical site followed by continuous capsulotomy, hydrodissection and phacoemulsification of the cataract.  Residual cortical material was removed using automated irrigation-aspiration technique.  Provisc was injected into the posterior chamber and an DIBOO 21.50 diopter lens was placed in the bag without difficulty. Residual Provisc was removed using automated irrigation-aspiration technique. The eye was re-pressurized using BSS solution and both the paracentesis site and the primary surgical site were demonstrated to be watertight at the end of the case with Weck--Mary manipulation.  Vigamox and Pred  Forte eye drops were placed on the cornea.  The eye was closed, and a clear shield placed.  The patient was taken to the recovery room in good and stable condition.  The patient tolerated the procedure well.  The patient was instructed to refrain from any heavy lifting, bending, stooping or straining activities, discharged home  and to follow-up in the morning for routine postoperative care with Dr. Soniya Macias.

## 2022-09-08 NOTE — ANESTHESIA POSTPROCEDURE EVALUATION
Anesthesia Post Evaluation    Patient: Carolee Bartlett    Procedure(s) Performed: Procedure(s) (LRB):  PHACOEMULSIFICATION, CATARACT (Left)  INSERTION, IOL PROSTHESIS (Left)    Final Anesthesia Type: MAC      Patient location during evaluation: PACU  Patient participation: Yes- Able to Participate  Level of consciousness: awake and alert  Post-procedure vital signs: reviewed and stable  Pain management: adequate  Airway patency: patent    PONV status at discharge: No PONV  Anesthetic complications: no      Cardiovascular status: blood pressure returned to baseline  Respiratory status: unassisted  Hydration status: euvolemic  Follow-up not needed.          Vitals Value Taken Time   /62 09/08/22 1145   Temp 37 °C (98.6 °F) 09/08/22 1145   Pulse 59 09/08/22 1145   Resp 18 09/08/22 1145   SpO2 98 % 09/08/22 1145         No case tracking events are documented in the log.      Pain/Everett Score: No data recorded

## 2022-09-09 ENCOUNTER — OFFICE VISIT (OUTPATIENT)
Dept: OPHTHALMOLOGY | Facility: CLINIC | Age: 67
End: 2022-09-09
Payer: MEDICARE

## 2022-09-09 DIAGNOSIS — Z98.890 POST-OPERATIVE STATE: Primary | ICD-10-CM

## 2022-09-09 LAB — POCT GLUCOSE: 124 MG/DL (ref 70–110)

## 2022-09-09 PROCEDURE — 1126F AMNT PAIN NOTED NONE PRSNT: CPT | Mod: CPTII,S$GLB,, | Performed by: STUDENT IN AN ORGANIZED HEALTH CARE EDUCATION/TRAINING PROGRAM

## 2022-09-09 PROCEDURE — 3288F FALL RISK ASSESSMENT DOCD: CPT | Mod: CPTII,S$GLB,, | Performed by: STUDENT IN AN ORGANIZED HEALTH CARE EDUCATION/TRAINING PROGRAM

## 2022-09-09 PROCEDURE — 99999 PR PBB SHADOW E&M-EST. PATIENT-LVL II: CPT | Mod: PBBFAC,,, | Performed by: STUDENT IN AN ORGANIZED HEALTH CARE EDUCATION/TRAINING PROGRAM

## 2022-09-09 PROCEDURE — 1159F PR MEDICATION LIST DOCUMENTED IN MEDICAL RECORD: ICD-10-PCS | Mod: CPTII,S$GLB,, | Performed by: STUDENT IN AN ORGANIZED HEALTH CARE EDUCATION/TRAINING PROGRAM

## 2022-09-09 PROCEDURE — 3044F PR MOST RECENT HEMOGLOBIN A1C LEVEL <7.0%: ICD-10-PCS | Mod: CPTII,S$GLB,, | Performed by: STUDENT IN AN ORGANIZED HEALTH CARE EDUCATION/TRAINING PROGRAM

## 2022-09-09 PROCEDURE — 1159F MED LIST DOCD IN RCRD: CPT | Mod: CPTII,S$GLB,, | Performed by: STUDENT IN AN ORGANIZED HEALTH CARE EDUCATION/TRAINING PROGRAM

## 2022-09-09 PROCEDURE — 1101F PT FALLS ASSESS-DOCD LE1/YR: CPT | Mod: CPTII,S$GLB,, | Performed by: STUDENT IN AN ORGANIZED HEALTH CARE EDUCATION/TRAINING PROGRAM

## 2022-09-09 PROCEDURE — 3044F HG A1C LEVEL LT 7.0%: CPT | Mod: CPTII,S$GLB,, | Performed by: STUDENT IN AN ORGANIZED HEALTH CARE EDUCATION/TRAINING PROGRAM

## 2022-09-09 PROCEDURE — 99999 PR PBB SHADOW E&M-EST. PATIENT-LVL II: ICD-10-PCS | Mod: PBBFAC,,, | Performed by: STUDENT IN AN ORGANIZED HEALTH CARE EDUCATION/TRAINING PROGRAM

## 2022-09-09 PROCEDURE — 3288F PR FALLS RISK ASSESSMENT DOCUMENTED: ICD-10-PCS | Mod: CPTII,S$GLB,, | Performed by: STUDENT IN AN ORGANIZED HEALTH CARE EDUCATION/TRAINING PROGRAM

## 2022-09-09 PROCEDURE — 1101F PR PT FALLS ASSESS DOC 0-1 FALLS W/OUT INJ PAST YR: ICD-10-PCS | Mod: CPTII,S$GLB,, | Performed by: STUDENT IN AN ORGANIZED HEALTH CARE EDUCATION/TRAINING PROGRAM

## 2022-09-09 PROCEDURE — 99024 POSTOP FOLLOW-UP VISIT: CPT | Mod: S$GLB,,, | Performed by: STUDENT IN AN ORGANIZED HEALTH CARE EDUCATION/TRAINING PROGRAM

## 2022-09-09 PROCEDURE — 99024 PR POST-OP FOLLOW-UP VISIT: ICD-10-PCS | Mod: S$GLB,,, | Performed by: STUDENT IN AN ORGANIZED HEALTH CARE EDUCATION/TRAINING PROGRAM

## 2022-09-09 PROCEDURE — 1126F PR PAIN SEVERITY QUANTIFIED, NO PAIN PRESENT: ICD-10-PCS | Mod: CPTII,S$GLB,, | Performed by: STUDENT IN AN ORGANIZED HEALTH CARE EDUCATION/TRAINING PROGRAM

## 2022-09-09 NOTE — PROGRESS NOTES
HPI    S/P Phacoemulsification of the cataract with posterior chamber intraocular   lens Left Eye.09/08/2022  1 day post op   Last edited by Anusha Go MA on 9/9/2022  8:56 AM.            Assessment /Plan     For exam results, see Encounter Report.    There are no diagnoses linked to this encounter.    Slit lamp exam:  L/L: nl  K: clear, wound sealed  AC: 1+ cell  Lens: IOL centered and stable       POD1 s/p Phaco/IOL  Appropriate precautions and post op medications reviewed.  Patient instructed to call or come in if symptoms of redness, decreased vision, or pain are experienced.  Cont standard post op drops in surgical eye    Cont latanoprost and xidra as previously     RTC POW1

## 2022-09-15 PROBLEM — H25.812 COMBINED FORM OF AGE-RELATED CATARACT, LEFT EYE: Status: RESOLVED | Noted: 2022-09-08 | Resolved: 2022-09-15

## 2022-09-15 NOTE — PROGRESS NOTES
Subjective:       Patient ID: Carolee Bartlett is a 67 y.o. female.    Chief Complaint: Post-op Evaluation  HPI    S/P Phacoemulsification of the cataract with posterior chamber intraocular   lens Left Eye.09/08/2022  1 week  post op     Latanoprost QHS OU  PF QID OS  Ketorolac QID OS  Moxi QID OS  Last edited by Omi Najera on 9/16/2022  9:23 AM.            Assessment & Plan   Pseudophakia, left eye    Combined forms of age-related cataract of right eye    Chronic angle-closure glaucoma of both eyes, severe stage    Keratoconjunctivitis sicca of both eyes    Ptosis, bilateral    POW#1 CE/IOL OS  DIBOO +21.50 target -0.05   VA good  IOP OK  Patient happy  Eye drops   Taper PF 3/2/1 OS   Ketorolac QID OS until bottle out   Vigamox QID OS until bottle out  Return to normal activities   Discussed if any worsening vision, worsening pain, discharge or drainage, call immediately  Post op handout given and all questions answered    Combined forms of age-related cataract OD  CHRONIC ANGLE CLOSURE  Visually significant and interfering with activities of daily living including driving/reading  Patient desires cataract surgery for visual rehabilitation.     R/B/A discussed and patient agrees to proceed with surgery.   IOL options discussed according to patient's goals and concomitant ocular pathology; and patient content with monofocal lens.  Refractive target discussed at length. Patient opts for distance    RIGHT EYE DIBOO +21.50 target -0.05  Need consent and pick date    CACG severe OU  Phacomorphic narrowing - angle quite narrow OU  -Fhx (+Mother, +Brother), Steroids (),Trauma()  -Drops: Latanoprost qHS OU (started 2/21/22)  -Drop intolerance/contraindication: -  -Laser:-  -Surgeries: CE IOL OS  -CCT: 605 // 640 thick  -Gonio: TM OU ---> ___  Tm 21//23 2/22 HVF SAD/IAD OU with high FN   2/22 RNFL Dec TI OD // Dec G/TS/TI B T OS       PRASHANTH OU  Started Xiidra 2/21/22  Pres free AT's about 6 x day   Gel drops or  ointment at night   Consider punctal plugs / doxy       Graves disease  No overt E/o ELMER on exam   Monitor     Eyelid ptosis  Hemifacial spasm / synkinesis  Dr. Burden    PLAN  Cont Latanoprost  Cont AT and Xiidra  Post op gtts as above    Consent and pick date CE IOL OD    RIGHT EYE DIBOO +21.50 target -0.05    RTC POD#1 Ce IOL OD    Soniya Macias M.D., M.S.  Department of Ophthalmology   Division of Glaucoma Surgery  [facility]

## 2022-09-16 ENCOUNTER — OFFICE VISIT (OUTPATIENT)
Dept: OPHTHALMOLOGY | Facility: CLINIC | Age: 67
End: 2022-09-16
Payer: MEDICARE

## 2022-09-16 DIAGNOSIS — H40.2233 CHRONIC ANGLE-CLOSURE GLAUCOMA OF BOTH EYES, SEVERE STAGE: ICD-10-CM

## 2022-09-16 DIAGNOSIS — Z96.1 PSEUDOPHAKIA, LEFT EYE: Primary | ICD-10-CM

## 2022-09-16 DIAGNOSIS — M35.01 KERATOCONJUNCTIVITIS SICCA OF BOTH EYES: ICD-10-CM

## 2022-09-16 DIAGNOSIS — H25.811 COMBINED FORMS OF AGE-RELATED CATARACT OF RIGHT EYE: ICD-10-CM

## 2022-09-16 DIAGNOSIS — H02.403 PTOSIS, BILATERAL: ICD-10-CM

## 2022-09-16 PROCEDURE — 3044F HG A1C LEVEL LT 7.0%: CPT | Mod: CPTII,S$GLB,, | Performed by: STUDENT IN AN ORGANIZED HEALTH CARE EDUCATION/TRAINING PROGRAM

## 2022-09-16 PROCEDURE — 1159F MED LIST DOCD IN RCRD: CPT | Mod: CPTII,S$GLB,, | Performed by: STUDENT IN AN ORGANIZED HEALTH CARE EDUCATION/TRAINING PROGRAM

## 2022-09-16 PROCEDURE — 3288F FALL RISK ASSESSMENT DOCD: CPT | Mod: CPTII,S$GLB,, | Performed by: STUDENT IN AN ORGANIZED HEALTH CARE EDUCATION/TRAINING PROGRAM

## 2022-09-16 PROCEDURE — 99024 POSTOP FOLLOW-UP VISIT: CPT | Mod: S$GLB,,, | Performed by: STUDENT IN AN ORGANIZED HEALTH CARE EDUCATION/TRAINING PROGRAM

## 2022-09-16 PROCEDURE — 3044F PR MOST RECENT HEMOGLOBIN A1C LEVEL <7.0%: ICD-10-PCS | Mod: CPTII,S$GLB,, | Performed by: STUDENT IN AN ORGANIZED HEALTH CARE EDUCATION/TRAINING PROGRAM

## 2022-09-16 PROCEDURE — 99999 PR PBB SHADOW E&M-EST. PATIENT-LVL III: CPT | Mod: PBBFAC,,, | Performed by: STUDENT IN AN ORGANIZED HEALTH CARE EDUCATION/TRAINING PROGRAM

## 2022-09-16 PROCEDURE — 99999 PR PBB SHADOW E&M-EST. PATIENT-LVL III: ICD-10-PCS | Mod: PBBFAC,,, | Performed by: STUDENT IN AN ORGANIZED HEALTH CARE EDUCATION/TRAINING PROGRAM

## 2022-09-16 PROCEDURE — 1126F AMNT PAIN NOTED NONE PRSNT: CPT | Mod: CPTII,S$GLB,, | Performed by: STUDENT IN AN ORGANIZED HEALTH CARE EDUCATION/TRAINING PROGRAM

## 2022-09-16 PROCEDURE — 3288F PR FALLS RISK ASSESSMENT DOCUMENTED: ICD-10-PCS | Mod: CPTII,S$GLB,, | Performed by: STUDENT IN AN ORGANIZED HEALTH CARE EDUCATION/TRAINING PROGRAM

## 2022-09-16 PROCEDURE — 1160F PR REVIEW ALL MEDS BY PRESCRIBER/CLIN PHARMACIST DOCUMENTED: ICD-10-PCS | Mod: CPTII,S$GLB,, | Performed by: STUDENT IN AN ORGANIZED HEALTH CARE EDUCATION/TRAINING PROGRAM

## 2022-09-16 PROCEDURE — 1101F PR PT FALLS ASSESS DOC 0-1 FALLS W/OUT INJ PAST YR: ICD-10-PCS | Mod: CPTII,S$GLB,, | Performed by: STUDENT IN AN ORGANIZED HEALTH CARE EDUCATION/TRAINING PROGRAM

## 2022-09-16 PROCEDURE — 1160F RVW MEDS BY RX/DR IN RCRD: CPT | Mod: CPTII,S$GLB,, | Performed by: STUDENT IN AN ORGANIZED HEALTH CARE EDUCATION/TRAINING PROGRAM

## 2022-09-16 PROCEDURE — 1126F PR PAIN SEVERITY QUANTIFIED, NO PAIN PRESENT: ICD-10-PCS | Mod: CPTII,S$GLB,, | Performed by: STUDENT IN AN ORGANIZED HEALTH CARE EDUCATION/TRAINING PROGRAM

## 2022-09-16 PROCEDURE — 99024 PR POST-OP FOLLOW-UP VISIT: ICD-10-PCS | Mod: S$GLB,,, | Performed by: STUDENT IN AN ORGANIZED HEALTH CARE EDUCATION/TRAINING PROGRAM

## 2022-09-16 PROCEDURE — 1159F PR MEDICATION LIST DOCUMENTED IN MEDICAL RECORD: ICD-10-PCS | Mod: CPTII,S$GLB,, | Performed by: STUDENT IN AN ORGANIZED HEALTH CARE EDUCATION/TRAINING PROGRAM

## 2022-09-16 PROCEDURE — 1101F PT FALLS ASSESS-DOCD LE1/YR: CPT | Mod: CPTII,S$GLB,, | Performed by: STUDENT IN AN ORGANIZED HEALTH CARE EDUCATION/TRAINING PROGRAM

## 2022-09-23 ENCOUNTER — HOSPITAL ENCOUNTER (OUTPATIENT)
Dept: RADIOLOGY | Facility: CLINIC | Age: 67
Discharge: HOME | End: 2022-09-23
Attending: INTERNAL MEDICINE
Payer: MEDICARE

## 2022-09-23 DIAGNOSIS — Z78.0 MENOPAUSE: ICD-10-CM

## 2022-09-23 PROCEDURE — 77080 DXA BONE DENSITY AXIAL: CPT | Mod: TC

## 2022-09-23 PROCEDURE — 77080 DEXA BONE DENSITY SPINE HIP: ICD-10-PCS | Mod: 26,,, | Performed by: INTERNAL MEDICINE

## 2022-09-23 PROCEDURE — 77080 DXA BONE DENSITY AXIAL: CPT | Mod: 26,,, | Performed by: INTERNAL MEDICINE

## 2022-10-20 ENCOUNTER — TELEPHONE (OUTPATIENT)
Dept: OPHTHALMOLOGY | Facility: CLINIC | Age: 67
End: 2022-10-20
Payer: MEDICARE

## 2022-10-20 ENCOUNTER — LAB VISIT (OUTPATIENT)
Dept: LAB | Facility: HOSPITAL | Age: 67
End: 2022-10-20
Attending: INTERNAL MEDICINE
Payer: MEDICARE

## 2022-10-20 DIAGNOSIS — R73.02 IMPAIRED GLUCOSE TOLERANCE: ICD-10-CM

## 2022-10-20 DIAGNOSIS — H25.811 COMBINED FORMS OF AGE-RELATED CATARACT OF RIGHT EYE: Primary | ICD-10-CM

## 2022-10-20 DIAGNOSIS — I10 HYPERTENSION, UNSPECIFIED TYPE: ICD-10-CM

## 2022-10-20 DIAGNOSIS — E78.5 HYPERLIPIDEMIA, UNSPECIFIED HYPERLIPIDEMIA TYPE: ICD-10-CM

## 2022-10-20 LAB
ALBUMIN SERPL BCP-MCNC: 4.1 G/DL (ref 3.5–5.2)
ALP SERPL-CCNC: 143 U/L (ref 55–135)
ALT SERPL W/O P-5'-P-CCNC: 26 U/L (ref 10–44)
ANION GAP SERPL CALC-SCNC: 8 MMOL/L (ref 8–16)
AST SERPL-CCNC: 18 U/L (ref 10–40)
BILIRUB SERPL-MCNC: 0.6 MG/DL (ref 0.1–1)
BUN SERPL-MCNC: 14 MG/DL (ref 8–23)
CALCIUM SERPL-MCNC: 10 MG/DL (ref 8.7–10.5)
CHLORIDE SERPL-SCNC: 104 MMOL/L (ref 95–110)
CHOLEST SERPL-MCNC: 256 MG/DL (ref 120–199)
CHOLEST/HDLC SERPL: 4.2 {RATIO} (ref 2–5)
CO2 SERPL-SCNC: 26 MMOL/L (ref 23–29)
CREAT SERPL-MCNC: 0.8 MG/DL (ref 0.5–1.4)
ERYTHROCYTE [DISTWIDTH] IN BLOOD BY AUTOMATED COUNT: 16.6 % (ref 11.5–14.5)
EST. GFR  (NO RACE VARIABLE): >60 ML/MIN/1.73 M^2
ESTIMATED AVG GLUCOSE: 146 MG/DL (ref 68–131)
GLUCOSE SERPL-MCNC: 135 MG/DL (ref 70–110)
HBA1C MFR BLD: 6.7 % (ref 4–5.6)
HCT VFR BLD AUTO: 43 % (ref 37–48.5)
HDLC SERPL-MCNC: 61 MG/DL (ref 40–75)
HDLC SERPL: 23.8 % (ref 20–50)
HGB BLD-MCNC: 12.8 G/DL (ref 12–16)
LDLC SERPL CALC-MCNC: 164.4 MG/DL (ref 63–159)
MCH RBC QN AUTO: 25.3 PG (ref 27–31)
MCHC RBC AUTO-ENTMCNC: 29.8 G/DL (ref 32–36)
MCV RBC AUTO: 85 FL (ref 82–98)
NONHDLC SERPL-MCNC: 195 MG/DL
PLATELET # BLD AUTO: 377 K/UL (ref 150–450)
PMV BLD AUTO: 10.7 FL (ref 9.2–12.9)
POTASSIUM SERPL-SCNC: 4.1 MMOL/L (ref 3.5–5.1)
PROT SERPL-MCNC: 8.3 G/DL (ref 6–8.4)
RBC # BLD AUTO: 5.05 M/UL (ref 4–5.4)
SODIUM SERPL-SCNC: 138 MMOL/L (ref 136–145)
TRIGL SERPL-MCNC: 153 MG/DL (ref 30–150)
WBC # BLD AUTO: 9.5 K/UL (ref 3.9–12.7)

## 2022-10-20 PROCEDURE — 80053 COMPREHEN METABOLIC PANEL: CPT | Performed by: INTERNAL MEDICINE

## 2022-10-20 PROCEDURE — 80061 LIPID PANEL: CPT | Performed by: INTERNAL MEDICINE

## 2022-10-20 PROCEDURE — 85027 COMPLETE CBC AUTOMATED: CPT | Performed by: INTERNAL MEDICINE

## 2022-10-20 PROCEDURE — 83036 HEMOGLOBIN GLYCOSYLATED A1C: CPT | Performed by: INTERNAL MEDICINE

## 2022-10-20 PROCEDURE — 36415 COLL VENOUS BLD VENIPUNCTURE: CPT | Performed by: INTERNAL MEDICINE

## 2022-10-27 ENCOUNTER — OFFICE VISIT (OUTPATIENT)
Dept: INTERNAL MEDICINE | Facility: CLINIC | Age: 67
End: 2022-10-27
Payer: MEDICARE

## 2022-10-27 VITALS
HEART RATE: 56 BPM | HEIGHT: 62 IN | DIASTOLIC BLOOD PRESSURE: 78 MMHG | WEIGHT: 223.13 LBS | TEMPERATURE: 99 F | SYSTOLIC BLOOD PRESSURE: 134 MMHG | BODY MASS INDEX: 41.06 KG/M2 | OXYGEN SATURATION: 97 %

## 2022-10-27 DIAGNOSIS — M25.559 HIP PAIN: ICD-10-CM

## 2022-10-27 DIAGNOSIS — Z11.59 ENCOUNTER FOR HEPATITIS C SCREENING TEST FOR LOW RISK PATIENT: ICD-10-CM

## 2022-10-27 DIAGNOSIS — Z12.11 SCREENING FOR COLORECTAL CANCER: ICD-10-CM

## 2022-10-27 DIAGNOSIS — E78.5 HYPERLIPIDEMIA, UNSPECIFIED HYPERLIPIDEMIA TYPE: ICD-10-CM

## 2022-10-27 DIAGNOSIS — I10 HYPERTENSION, UNSPECIFIED TYPE: ICD-10-CM

## 2022-10-27 DIAGNOSIS — E11.65 TYPE 2 DIABETES MELLITUS WITH HYPERGLYCEMIA, WITHOUT LONG-TERM CURRENT USE OF INSULIN: ICD-10-CM

## 2022-10-27 DIAGNOSIS — E89.0 POSTABLATIVE HYPOTHYROIDISM: ICD-10-CM

## 2022-10-27 DIAGNOSIS — Z12.31 ENCOUNTER FOR SCREENING MAMMOGRAM FOR MALIGNANT NEOPLASM OF BREAST: ICD-10-CM

## 2022-10-27 DIAGNOSIS — R73.02 IMPAIRED GLUCOSE TOLERANCE: ICD-10-CM

## 2022-10-27 DIAGNOSIS — Z12.12 SCREENING FOR COLORECTAL CANCER: ICD-10-CM

## 2022-10-27 DIAGNOSIS — Z00.00 WELLNESS EXAMINATION: Primary | ICD-10-CM

## 2022-10-27 PROCEDURE — 3044F HG A1C LEVEL LT 7.0%: CPT | Mod: CPTII,S$GLB,, | Performed by: INTERNAL MEDICINE

## 2022-10-27 PROCEDURE — 3044F PR MOST RECENT HEMOGLOBIN A1C LEVEL <7.0%: ICD-10-PCS | Mod: CPTII,S$GLB,, | Performed by: INTERNAL MEDICINE

## 2022-10-27 PROCEDURE — 99397 PR PREVENTIVE VISIT,EST,65 & OVER: ICD-10-PCS | Mod: GZ,S$GLB,, | Performed by: INTERNAL MEDICINE

## 2022-10-27 PROCEDURE — 99499 UNLISTED E&M SERVICE: CPT | Mod: S$PBB,,, | Performed by: INTERNAL MEDICINE

## 2022-10-27 PROCEDURE — 3075F SYST BP GE 130 - 139MM HG: CPT | Mod: CPTII,S$GLB,, | Performed by: INTERNAL MEDICINE

## 2022-10-27 PROCEDURE — 1159F MED LIST DOCD IN RCRD: CPT | Mod: CPTII,S$GLB,, | Performed by: INTERNAL MEDICINE

## 2022-10-27 PROCEDURE — 3061F NEG MICROALBUMINURIA REV: CPT | Mod: CPTII,S$GLB,, | Performed by: INTERNAL MEDICINE

## 2022-10-27 PROCEDURE — 3008F BODY MASS INDEX DOCD: CPT | Mod: CPTII,S$GLB,, | Performed by: INTERNAL MEDICINE

## 2022-10-27 PROCEDURE — 3061F PR NEG MICROALBUMINURIA RESULT DOCUMENTED/REVIEW: ICD-10-PCS | Mod: CPTII,S$GLB,, | Performed by: INTERNAL MEDICINE

## 2022-10-27 PROCEDURE — 3075F PR MOST RECENT SYSTOLIC BLOOD PRESS GE 130-139MM HG: ICD-10-PCS | Mod: CPTII,S$GLB,, | Performed by: INTERNAL MEDICINE

## 2022-10-27 PROCEDURE — 3078F PR MOST RECENT DIASTOLIC BLOOD PRESSURE < 80 MM HG: ICD-10-PCS | Mod: CPTII,S$GLB,, | Performed by: INTERNAL MEDICINE

## 2022-10-27 PROCEDURE — 1100F PR PT FALLS ASSESS DOC 2+ FALLS/FALL W/INJURY/YR: ICD-10-PCS | Mod: CPTII,S$GLB,, | Performed by: INTERNAL MEDICINE

## 2022-10-27 PROCEDURE — 3008F PR BODY MASS INDEX (BMI) DOCUMENTED: ICD-10-PCS | Mod: CPTII,S$GLB,, | Performed by: INTERNAL MEDICINE

## 2022-10-27 PROCEDURE — 3066F NEPHROPATHY DOC TX: CPT | Mod: CPTII,S$GLB,, | Performed by: INTERNAL MEDICINE

## 2022-10-27 PROCEDURE — 99499 RISK ADDL DX/OHS AUDIT: ICD-10-PCS | Mod: S$PBB,,, | Performed by: INTERNAL MEDICINE

## 2022-10-27 PROCEDURE — 99999 PR PBB SHADOW E&M-EST. PATIENT-LVL IV: ICD-10-PCS | Mod: PBBFAC,,, | Performed by: INTERNAL MEDICINE

## 2022-10-27 PROCEDURE — 3288F PR FALLS RISK ASSESSMENT DOCUMENTED: ICD-10-PCS | Mod: CPTII,S$GLB,, | Performed by: INTERNAL MEDICINE

## 2022-10-27 PROCEDURE — 3288F FALL RISK ASSESSMENT DOCD: CPT | Mod: CPTII,S$GLB,, | Performed by: INTERNAL MEDICINE

## 2022-10-27 PROCEDURE — 99397 PER PM REEVAL EST PAT 65+ YR: CPT | Mod: GZ,S$GLB,, | Performed by: INTERNAL MEDICINE

## 2022-10-27 PROCEDURE — 3078F DIAST BP <80 MM HG: CPT | Mod: CPTII,S$GLB,, | Performed by: INTERNAL MEDICINE

## 2022-10-27 PROCEDURE — 1159F PR MEDICATION LIST DOCUMENTED IN MEDICAL RECORD: ICD-10-PCS | Mod: CPTII,S$GLB,, | Performed by: INTERNAL MEDICINE

## 2022-10-27 PROCEDURE — 3066F PR DOCUMENTATION OF TREATMENT FOR NEPHROPATHY: ICD-10-PCS | Mod: CPTII,S$GLB,, | Performed by: INTERNAL MEDICINE

## 2022-10-27 PROCEDURE — 99999 PR PBB SHADOW E&M-EST. PATIENT-LVL IV: CPT | Mod: PBBFAC,,, | Performed by: INTERNAL MEDICINE

## 2022-10-27 PROCEDURE — 1100F PTFALLS ASSESS-DOCD GE2>/YR: CPT | Mod: CPTII,S$GLB,, | Performed by: INTERNAL MEDICINE

## 2022-10-27 RX ORDER — METFORMIN HYDROCHLORIDE 1000 MG/1
500 TABLET ORAL
Qty: 90 TABLET | Refills: 1 | Status: SHIPPED | OUTPATIENT
Start: 2022-10-27 | End: 2023-11-16 | Stop reason: SDUPTHER

## 2022-10-27 NOTE — PROGRESS NOTES
[facility] Note    Chief Complaint      Chief Complaint   Patient presents with    Annual Exam     Review labs    Hip Pain     RT side hip pain, had fallen at home while cleaning a fan      History of Present Illness      Carolee Bartlett is a 67 y.o. female who presents today for chief complaint annual wellness.     HPI   Annual wellness, labs last week   Last seen in April to est care     preDM on metformin once daily A1c 6.7  HTN at goal on arb   HLD and DM is on nd statin,  up from 97  CMP Microalb last week normal as well, cbc unremarkable   Post ablative hypoth TSH  not doen was subclin hyper earlier this year , meds were reduced earlier 112->100 this year   Lab Results   Component Value Date    TSH 0.012 (L) 04/20/2022     Sees ophthalomology [place], cataract suregy went well, having Right eye in [facility]   no LE neuropathy   she is having hip pain, she fell off an ottoman last month while cleaning her ceiling fan, it is radiatinfg down her leg, no mid back or spone pain, non radiating     mmg due,   Colorectal screening doesn't want to do cologuard   dexa 9.22 openia not high fx risk,  resulrts discussed   Gyn does not have  Needs hcv screen        Retired  for [employer]   Active Problem List with Overview Notes    Diagnosis Date Noted    Hip pain 11/07/2022    Glaucoma 06/09/2022    Impaired glucose tolerance 04/20/2022    Hyperlipidemia 04/20/2022    Hypertension 04/20/2022    Muscle cramps 04/20/2022    Avascular necrosis of right femur 11/26/2018    S/P total hip arthroplasty 11/26/2018    Postablative hypothyroidism 11/26/2018       Health Maintenance   Topic Date Due    Hepatitis C Screening  Never done    Foot Exam  Never done    TETANUS VACCINE  Never done    Mammogram  11/03/2022    Hemoglobin A1c  04/20/2023    Eye Exam  06/13/2023    Lipid Panel  10/20/2023    Low Dose Statin  10/27/2023    DEXA Scan  09/23/2024       Past Medical History:    Diagnosis Date    Avascular necrosis     Cataract 09/09/2022    left    Diabetes mellitus     Encounter for blood transfusion     Glaucoma     Graves disease     Hypertension     Other abnormal glucose     Pre-Diabetic    Sciatica        Past Surgical History:   Procedure Laterality Date    HIP ARTHROPLASTY Right 11/26/2018    Procedure: ARTHROPLASTY, HIP, [place], peg board, first assist;  Surgeon: Olayinka Redding MD;  [facility];  Service: Orthopedics;  Laterality: Right;  NOE ORTHO CONFIRMED 11/20/DME FIRST ASSISTANT CONFIRMED 11/21/DME    INTRAOCULAR PROSTHESES INSERTION Left 9/8/2022    Procedure: INSERTION, IOL PROSTHESIS;  Surgeon: Soniya Macias MD;  Location: [facility] OR [address] [place] [address];  Service: Ophthalmology;  Laterality: Left;    PHACOEMULSIFICATION OF CATARACT Left 9/8/2022    Procedure: PHACOEMULSIFICATION, CATARACT;  Surgeon: Soniya Macias MD;  Location: [facility] OR [address] [place] [address];  Service: Ophthalmology;  Laterality: Left;    TUBAL LIGATION         family history includes Blindness in her mother; Breast cancer (age of onset: 33) in her sister; Diabetes in her mother and sister; Glaucoma in her brother and mother; Heart attack (age of onset: 40) in her brother; Hypertension in her mother; Kidney failure in her mother; Stomach cancer in her father.    Social History     Tobacco Use    Smoking status: Some Days     Packs/day: 0.25     Types: Cigarettes    Smokeless tobacco: Never   Substance Use Topics    Alcohol use: No    Drug use: No       Review of Systems   Constitutional:  Negative for chills, fever, malaise/fatigue and weight loss.   Respiratory:  Negative for cough, sputum production, shortness of breath and wheezing.    Cardiovascular:  Negative for chest pain, palpitations, orthopnea and leg swelling.   Gastrointestinal:  Negative for constipation, diarrhea, nausea and vomiting.   Genitourinary:  Negative for dysuria, frequency, hematuria and urgency.   Musculoskeletal:  Positive for  falls and joint pain.      Outpatient Encounter Medications as of 10/27/2022   Medication Sig Note Dispense Refill    atorvastatin (LIPITOR) 10 MG tablet Take 1 tablet (10 mg total) by mouth once daily. (Patient taking differently: Take 10 mg by mouth every evening.)  90 tablet 3    latanoprost 0.005 % ophthalmic solution INSTILL 1 DROP INTO BOTH EYES IN THE EVENING  2.5 mL 3    levothyroxine (SYNTHROID) 100 MCG tablet Take 1 tablet (100 mcg total) by mouth before breakfast. 9/7/2022: Take as prescribed am of procedure                      90 tablet 3    lifitegrast (XIIDRA) 5 % Dpet Place 1 drop into both eyes 2 (two) times a day. 9/7/2022: Take as prescribed  60 each 11    losartan-hydrochlorothiazide 50-12.5 mg (HYZAAR) 50-12.5 mg per tablet Take 1 tablet by mouth once daily. 9/7/2022: Hold am of surgery  90 tablet 3    [DISCONTINUED] metFORMIN (GLUCOPHAGE) 500 MG tablet PLEASE SEE ATTACHED FOR DETAILED DIRECTIONS (Patient taking differently: Take 500 mg by mouth 2 (two) times daily with meals.) 9/7/2022: Hold PM prior and hold the AM of Surgery    180 tablet 0    metFORMIN (GLUCOPHAGE) 1000 MG tablet Take 0.5 tablets (500 mg total) by mouth daily with breakfast.  90 tablet 1     Facility-Administered Encounter Medications as of 10/27/2022   Medication Dose Route Frequency Provider Last Rate Last Admin    ketorolac 0.5% ophthalmic solution 1 drop  1 drop Left Eye On Call Procedure Soniya Macias MD   1 drop at 09/08/22 1049    moxifloxacin 0.5 % ophthalmic solution 1 drop  1 drop Left Eye On Call Procedure Soniya Macias MD   1 drop at 09/08/22 0940    phenylephrine HCL 2.5% ophthalmic solution 1 drop  1 drop Left Eye On Call Procedure Soniya Macias MD   1 drop at 09/08/22 0940    proparacaine 0.5 % ophthalmic solution 1 drop  1 drop Left Eye On Call Procedure Soniya Macias MD   1 drop at 09/08/22 1053    sodium chloride 0.9% flush 10 mL  10 mL Intravenous PRN Soniya Macias MD        tropicamide 1%  "ophthalmic solution 1 drop  1 drop Left Eye On Call Procedure Soniya Macias MD   1 drop at 09/08/22 0940        Review of patient's allergies indicates:  No Known Allergies        Physical Exam      Vital Signs  Temp: 98.9 °F (37.2 °C)  Pulse: (!) 56  SpO2: 97 %  BP: 134/78  BP Location: Right arm  Patient Position: Sitting  Height and Weight  Height: 5' 2" (157.5 cm)  Weight: 101.2 kg (223 lb 1.7 oz)  BSA (Calculated - sq m): 2.1 sq meters  BMI (Calculated): 40.8  Weight in (lb) to have BMI = 25: 136.4]    Physical Exam  Vitals reviewed.   Constitutional:       General: She is not in acute distress.     Appearance: She is well-developed. She is not diaphoretic.   HENT:      Head: Normocephalic and atraumatic.      Right Ear: External ear normal.      Left Ear: External ear normal.      Nose: Nose normal.   Eyes:      General:         Right eye: No discharge.         Left eye: No discharge.      Conjunctiva/sclera: Conjunctivae normal.   Cardiovascular:      Rate and Rhythm: Normal rate and regular rhythm.      Heart sounds: Normal heart sounds.   Pulmonary:      Effort: Pulmonary effort is normal. No respiratory distress.      Breath sounds: Normal breath sounds.   Musculoskeletal:         General: Normal range of motion.      Cervical back: Normal range of motion.   Skin:     Coloration: Skin is not pale.      Findings: No rash.   Neurological:      Mental Status: She is alert and oriented to person, place, and time.   Psychiatric:         Behavior: Behavior normal.         Thought Content: Thought content normal.        Laboratory:  CBC:  Recent Labs   Lab Result Units 10/20/22  0837   WBC K/uL 9.50   RBC M/uL 5.05   Hemoglobin g/dL 12.8   Hematocrit % 43.0   Platelets K/uL 377   MCV fL 85   MCH pg 25.3*   MCHC g/dL 29.8*     CMP:  Recent Labs   Lab Result Units 10/20/22  0837   Glucose mg/dL 135*   Calcium mg/dL 10.0   Albumin g/dL 4.1   Total Protein g/dL 8.3   Sodium mmol/L 138   Potassium mmol/L 4.1   CO2 " mmol/L 26   Chloride mmol/L 104   BUN mg/dL 14   Alkaline Phosphatase U/L 143*   ALT U/L 26   AST U/L 18   Total Bilirubin mg/dL 0.6     URINALYSIS:  No results for input(s): COLORU, CLARITYU, SPECGRAV, PHUR, PROTEINUA, GLUCOSEU, BILIRUBINCON, BLOODU, WBCU, RBCU, BACTERIA, MUCUS, NITRITE, LEUKOCYTESUR, UROBILINOGEN, HYALINECASTS in the last 2160 hours.   LIPIDS:  Recent Labs   Lab Result Units 10/20/22  0837   HDL mg/dL 61   Cholesterol mg/dL 256*   Triglycerides mg/dL 153*   LDL Cholesterol mg/dL 164.4*   HDL/Cholesterol Ratio % 23.8   Non-HDL Cholesterol mg/dL 195   Total Cholesterol/HDL Ratio  4.2     TSH:  No results for input(s): TSH in the last 2160 hours.  A1C:  Recent Labs   Lab Result Units 10/20/22  0837   Hemoglobin A1C % 6.7*       Radiology:      Assessment/Plan     Carolee Bartlett is a 67 y.o.female with:    1. Wellness examination  -     Ambulatory referral/consult to Endo Procedure ; Future; Expected date: 10/28/2022  -     Mammo Digital Screening Bilat w/ Carlos; Future; Expected date: 10/27/2022    2. Type 2 diabetes mellitus with hyperglycemia, without long-term current use of insulin  -     metFORMIN (GLUCOPHAGE) 1000 MG tablet; Take 0.5 tablets (500 mg total) by mouth daily with breakfast.  Dispense: 90 tablet; Refill: 1    3. Hyperlipidemia, unspecified hyperlipidemia type  Assessment & Plan:  Suspect lab error, will recheck     Orders:  -     Lipid Panel; Future; Expected date: 10/27/2022    4. Postablative hypothyroidism  Assessment & Plan:  Due for follow up labs     Orders:  -     T4, Free; Future; Expected date: 10/27/2022  -     TSH; Future; Expected date: 10/27/2022    5. Encounter for hepatitis C screening test for low risk patient  -     Hepatitis C Antibody; Future; Expected date: 10/27/2022    6. Screening for colorectal cancer  -     Ambulatory referral/consult to Endo Procedure ; Future; Expected date: 10/28/2022    7. Encounter for screening mammogram for  malignant neoplasm of breast  -     Mammo Digital Screening Bilat w/ Carlos; Future; Expected date: 10/27/2022    8. Hypertension, unspecified type  Assessment & Plan:  At goal       9. Impaired glucose tolerance  Assessment & Plan:  a1c at goal no change       10. Hip pain  Assessment & Plan:  Conservative tx  Given info on stretching exercises         Use of the [employer] Patient Portal discussed and encouraged during today's visit  -Continue current medications and maintain follow up with specialists.  Return to clinic in 6 months .  Future Appointments   Date Time Provider Department Center   1/10/2023 11:00 AM [facility] OIC-[id number] [facility] MAMMO[facility] Imaging [facility]       Renata Gomez MD  11/7/2022 11:59 AM    Primary Care Internal Medicine

## 2022-10-27 NOTE — PATIENT INSTRUCTIONS
New rx for metformin sent    Hip pain:  Introduction  Here are some examples of typical rehabilitation exercises for your condition. Start each exercise slowly. Ease off the exercise if you start to have pain.    Your doctor or physical therapist will tell you when you can start these exercises and which ones will work best for you.    When you are not being active, find a comfortable position for rest. Some people are comfortable on the floor or a medium-firm bed with a small pillow under their head and another under their knees. Some people prefer to lie on their side with a pillow between their knees. Don't stay in one position for too long.    Take short walks (10 to 20 minutes) every 2 to 3 hours. Avoid slopes, hills, and stairs until you feel better. Walk only distances you can manage without pain, especially leg pain.    How to do the exercises  Figure 1  Back stretches  Picture of back stretches  Get down on your hands and knees on the floor.  Relax your head and allow it to droop. Round your back up toward the ceiling until you feel a nice stretch in your upper, middle, and lower back. Hold this stretch for as long as it feels comfortable, or about 15 to 30 seconds.  Return to the starting position with a flat back while you are on your hands and knees.  Let your back sway by pressing your stomach toward the floor. Lift your buttocks toward the ceiling.  Hold this position for 15 to 30 seconds.  Repeat 2 to 4 times.      https://www.bonehealthandosteoporosis.org/preventing-fractures/exercise-to-stay-healthy/

## 2022-11-07 PROBLEM — M25.559 HIP PAIN: Status: ACTIVE | Noted: 2022-11-07

## 2022-12-05 ENCOUNTER — LAB VISIT (OUTPATIENT)
Dept: LAB | Facility: HOSPITAL | Age: 67
End: 2022-12-05
Attending: INTERNAL MEDICINE
Payer: MEDICAID

## 2022-12-05 DIAGNOSIS — Z11.59 ENCOUNTER FOR HEPATITIS C SCREENING TEST FOR LOW RISK PATIENT: ICD-10-CM

## 2022-12-05 DIAGNOSIS — E89.0 POSTABLATIVE HYPOTHYROIDISM: ICD-10-CM

## 2022-12-05 DIAGNOSIS — E78.5 HYPERLIPIDEMIA, UNSPECIFIED HYPERLIPIDEMIA TYPE: ICD-10-CM

## 2022-12-05 LAB
CHOLEST SERPL-MCNC: 222 MG/DL (ref 120–199)
CHOLEST/HDLC SERPL: 4.1 {RATIO} (ref 2–5)
HCV AB SERPL QL IA: NORMAL
HDLC SERPL-MCNC: 54 MG/DL (ref 40–75)
HDLC SERPL: 24.3 % (ref 20–50)
LDLC SERPL CALC-MCNC: 144.6 MG/DL (ref 63–159)
NONHDLC SERPL-MCNC: 168 MG/DL
T4 FREE SERPL-MCNC: 1.16 NG/DL (ref 0.71–1.51)
TRIGL SERPL-MCNC: 117 MG/DL (ref 30–150)
TSH SERPL DL<=0.005 MIU/L-ACNC: 0.17 UIU/ML (ref 0.4–4)

## 2022-12-05 PROCEDURE — 36415 COLL VENOUS BLD VENIPUNCTURE: CPT | Performed by: INTERNAL MEDICINE

## 2022-12-05 PROCEDURE — 84439 ASSAY OF FREE THYROXINE: CPT | Performed by: INTERNAL MEDICINE

## 2022-12-05 PROCEDURE — 80061 LIPID PANEL: CPT | Performed by: INTERNAL MEDICINE

## 2022-12-05 PROCEDURE — 84443 ASSAY THYROID STIM HORMONE: CPT | Performed by: INTERNAL MEDICINE

## 2022-12-05 PROCEDURE — 86803 HEPATITIS C AB TEST: CPT | Performed by: INTERNAL MEDICINE

## 2023-01-10 ENCOUNTER — HOSPITAL ENCOUNTER (OUTPATIENT)
Dept: RADIOLOGY | Facility: HOSPITAL | Age: 68
Discharge: HOME | End: 2023-01-10
Attending: INTERNAL MEDICINE
Payer: MEDICARE

## 2023-01-10 DIAGNOSIS — Z12.31 ENCOUNTER FOR SCREENING MAMMOGRAM FOR MALIGNANT NEOPLASM OF BREAST: ICD-10-CM

## 2023-01-10 DIAGNOSIS — Z00.00 WELLNESS EXAMINATION: ICD-10-CM

## 2023-01-10 PROCEDURE — 77067 SCR MAMMO BI INCL CAD: CPT | Mod: TC

## 2023-01-10 PROCEDURE — 77063 MAMMO DIGITAL SCREENING BILAT WITH TOMO: ICD-10-PCS | Mod: 26,,, | Performed by: RADIOLOGY

## 2023-01-10 PROCEDURE — 77063 BREAST TOMOSYNTHESIS BI: CPT | Mod: 26,,, | Performed by: RADIOLOGY

## 2023-01-10 PROCEDURE — 77067 MAMMO DIGITAL SCREENING BILAT WITH TOMO: ICD-10-PCS | Mod: 26,,, | Performed by: RADIOLOGY

## 2023-01-10 PROCEDURE — 77067 SCR MAMMO BI INCL CAD: CPT | Mod: 26,,, | Performed by: RADIOLOGY

## 2023-01-24 ENCOUNTER — TELEPHONE (OUTPATIENT)
Dept: OPHTHALMOLOGY | Facility: CLINIC | Age: 68
End: 2023-01-24
Payer: MEDICAID

## 2023-01-24 RX ORDER — SODIUM CHLORIDE 0.9 % (FLUSH) 0.9 %
10 SYRINGE (ML) INJECTION
Status: CANCELLED | OUTPATIENT
Start: 2023-01-24

## 2023-01-24 NOTE — H&P
Ophthalmology Preoperative History and Physical Exam     CC/Reason for Surgery: Visually significant combined forms of age-related cataract RIGHT eye    HPI:   Pt presents c/o progressive decrease in vision, blurred vision, difficulty reading in dim light, trouble driving at night and significant glare and halos around lights at night.      Past Medical History:  Past Medical History:   Diagnosis Date    Avascular necrosis     Cataract 09/09/2022    left    Diabetes mellitus     Encounter for blood transfusion     Glaucoma     Graves disease     Hypertension     Other abnormal glucose     Pre-Diabetic    Sciatica         Past Surgical History:  Past Surgical History:   Procedure Laterality Date    HIP ARTHROPLASTY Right 11/26/2018    Procedure: ARTHROPLASTY, HIP, [place], peg board, first assist;  Surgeon: Olayinka Redding MD;  Location: [facility];  Service: Orthopedics;  Laterality: Right;  NOE ORTHO CONFIRMED 11/20/DME FIRST ASSISTANT CONFIRMED 11/21/DME    INTRAOCULAR PROSTHESES INSERTION Left 9/8/2022    Procedure: INSERTION, IOL PROSTHESIS;  Surgeon: Soniya Macias MD;  Location: [facility] OR [address] [place] [address];  Service: Ophthalmology;  Laterality: Left;    PHACOEMULSIFICATION OF CATARACT Left 9/8/2022    Procedure: PHACOEMULSIFICATION, CATARACT;  Surgeon: Soniya Macias MD;  Location: [facility] OR [address] [place] [address];  Service: Ophthalmology;  Laterality: Left;    TUBAL LIGATION          Past Ocular History:  CE IOL OS    Allergies:  Review of patient's allergies indicates:  No Known Allergies     Social History:  Social History     Socioeconomic History    Marital status:     Number of children: 1   Occupational History     Comment: volunteer at the school    Tobacco Use    Smoking status: Some Days     Packs/day: 0.25     Types: Cigarettes    Smokeless tobacco: Never   Substance and Sexual Activity    Alcohol use: No    Drug use: No        Medications:  No current facility-administered medications for this  encounter.    Current Outpatient Medications:     atorvastatin (LIPITOR) 10 MG tablet, TAKE 1 TABLET BY MOUTH EVERY DAY, Disp: 90 tablet, Rfl: 3    latanoprost 0.005 % ophthalmic solution, INSTILL 1 DROP INTO BOTH EYES IN THE EVENING, Disp: 2.5 mL, Rfl: 2    levothyroxine (SYNTHROID) 100 MCG tablet, Take 1 tablet (100 mcg total) by mouth before breakfast., Disp: 90 tablet, Rfl: 3    lifitegrast (XIIDRA) 5 % Dpet, Place 1 drop into both eyes 2 (two) times a day., Disp: 60 each, Rfl: 11    losartan-hydrochlorothiazide 50-12.5 mg (HYZAAR) 50-12.5 mg per tablet, Take 1 tablet by mouth once daily., Disp: 90 tablet, Rfl: 3    metFORMIN (GLUCOPHAGE) 1000 MG tablet, Take 0.5 tablets (500 mg total) by mouth daily with breakfast., Disp: 90 tablet, Rfl: 1    Facility-Administered Medications Ordered in Other Encounters:     ketorolac 0.5% ophthalmic solution 1 drop, 1 drop, Left Eye, On Call Procedure, Soniya Macias MD, 1 drop at 09/08/22 1049    moxifloxacin 0.5 % ophthalmic solution 1 drop, 1 drop, Left Eye, On Call Procedure, Soniya Macias MD, 1 drop at 09/08/22 0940    phenylephrine HCL 2.5% ophthalmic solution 1 drop, 1 drop, Left Eye, On Call Procedure, Soniya Macias MD, 1 drop at 09/08/22 0940    proparacaine 0.5 % ophthalmic solution 1 drop, 1 drop, Left Eye, On Call Procedure, Soniya Macias MD, 1 drop at 09/08/22 1053    sodium chloride 0.9% flush 10 mL, 10 mL, Intravenous, PRN, Soniya Macias MD    tropicamide 1% ophthalmic solution 1 drop, 1 drop, Left Eye, On Call Procedure, Soniya Macias MD, 1 drop at 09/08/22 0940     Family History:  Family History   Problem Relation Age of Onset    Kidney failure Mother     Hypertension Mother         was on list to have kidney, lung, and heart transplant    Glaucoma Mother     Blindness Mother     Diabetes Mother     Stomach cancer Father     Breast cancer Sister 33        double mastectomy    Diabetes Sister     Heart attack Brother 40    Glaucoma Brother      Amblyopia Neg Hx     Cataracts Neg Hx     Macular degeneration Neg Hx     Retinal detachment Neg Hx     Strabismus Neg Hx         ROS:   Constitutional: WNL   Eyes: See HPI   Ears: WNL   CV: WNL   Resp: WNL   Gastro: WNL    Musculo: WNL   Skin: WNL   Neuro: WNL     Physical Exam:  See nursing intake for vitals    General: No acute distress  HEENT: NC/AT  CV: Pulses strong and equal bilaterally  Resp: Breathing comfortably on room air  Musculoskeletal: WNL, able to lay flat    Lab Results:  CBC w/Diff   Lab Results   Component Value Date/Time    WBC 9.50 10/20/2022 08:37 AM    RBC 5.05 10/20/2022 08:37 AM    HGB 12.8 10/20/2022 08:37 AM    HCT 43.0 10/20/2022 08:37 AM    MCV 85 10/20/2022 08:37 AM    MCH 25.3 (L) 10/20/2022 08:37 AM    MCHC 29.8 (L) 10/20/2022 08:37 AM    RDW 16.6 (H) 10/20/2022 08:37 AM    MPV 10.7 10/20/2022 08:37 AM    No components found for: NEUT, ANC, LYMA, ALC, VINEET, AMC, EOSA, AEC, BASA, ABC     Imaging:  None    Assessment:  1: Visually significant combined forms of age-related cataract RIGHT eye    Plan:  To operating room for Cataract Extraction with Intraocular Lens Placement RIGHT Eye    An extensive discussion took place with the patient concerning the risks, benefits and alternatives to the above procedure. The patient was given the opportunity to have all questions answered. At the conclusion of our discussion, signed informed consent was obtained.     Soniya Macias M.D., M.S.  Department of Ophthalmology   Division of Glaucoma Surgery  [facility]

## 2023-01-24 NOTE — PRE-PROCEDURE INSTRUCTIONS
Preop instructions: NPO solids/ milk products after midnight and clears up to 2 hours before arrival (clear liquids are: water, apple juice, Gatorade & Jell-O), shower instructions, directions, leave all valuables at home, wear comfortable clothes (something that buttons up the front is best), medication instructions explained. Patient stated an understanding.     Patient denies any side effects or issues with anesthesia or sedation.    Patient does not know arrival time. Explained that this information comes from the surgeon's office and if they have not heard from them by 3pm tomorrow, to call surgeon's office. Patient stated an understanding.

## 2023-01-26 ENCOUNTER — HOSPITAL ENCOUNTER (OUTPATIENT)
Facility: HOSPITAL | Age: 68
Discharge: HOME | End: 2023-01-26
Attending: STUDENT IN AN ORGANIZED HEALTH CARE EDUCATION/TRAINING PROGRAM | Admitting: STUDENT IN AN ORGANIZED HEALTH CARE EDUCATION/TRAINING PROGRAM
Payer: MEDICARE

## 2023-01-26 ENCOUNTER — ANESTHESIA EVENT (OUTPATIENT)
Dept: SURGERY | Facility: HOSPITAL | Age: 68
End: 2023-01-26
Payer: MEDICARE

## 2023-01-26 ENCOUNTER — ANESTHESIA (OUTPATIENT)
Dept: SURGERY | Facility: HOSPITAL | Age: 68
End: 2023-01-26
Payer: MEDICARE

## 2023-01-26 VITALS
HEIGHT: 62 IN | TEMPERATURE: 97 F | WEIGHT: 223 LBS | RESPIRATION RATE: 16 BRPM | HEART RATE: 59 BPM | OXYGEN SATURATION: 100 % | SYSTOLIC BLOOD PRESSURE: 122 MMHG | BODY MASS INDEX: 41.04 KG/M2 | DIASTOLIC BLOOD PRESSURE: 58 MMHG

## 2023-01-26 DIAGNOSIS — H25.811 COMBINED FORMS OF AGE-RELATED CATARACT OF RIGHT EYE: Primary | ICD-10-CM

## 2023-01-26 DIAGNOSIS — H25.811 COMBINED FORM OF AGE-RELATED CATARACT, RIGHT EYE: ICD-10-CM

## 2023-01-26 LAB
POCT GLUCOSE: 140 MG/DL (ref 70–110)
POCT GLUCOSE: 147 MG/DL (ref 70–110)

## 2023-01-26 PROCEDURE — D9220A PRA ANESTHESIA: Mod: CRNA,,, | Performed by: NURSE ANESTHETIST, CERTIFIED REGISTERED

## 2023-01-26 PROCEDURE — 63600175 PHARM REV CODE 636 W HCPCS: Performed by: NURSE ANESTHETIST, CERTIFIED REGISTERED

## 2023-01-26 PROCEDURE — 66982 XCAPSL CTRC RMVL CPLX WO ECP: CPT | Mod: RT,,, | Performed by: STUDENT IN AN ORGANIZED HEALTH CARE EDUCATION/TRAINING PROGRAM

## 2023-01-26 PROCEDURE — 25000003 PHARM REV CODE 250

## 2023-01-26 PROCEDURE — 66982 PR REMOVAL, CATARACT, W/INSRT INTRAOC LENS, W/O ENDO CYCLO, CMPLX: ICD-10-PCS | Mod: RT,,, | Performed by: STUDENT IN AN ORGANIZED HEALTH CARE EDUCATION/TRAINING PROGRAM

## 2023-01-26 PROCEDURE — D9220A PRA ANESTHESIA: Mod: ANES,,, | Performed by: ANESTHESIOLOGY

## 2023-01-26 PROCEDURE — 71000044 HC DOSC ROUTINE RECOVERY FIRST HOUR: Performed by: STUDENT IN AN ORGANIZED HEALTH CARE EDUCATION/TRAINING PROGRAM

## 2023-01-26 PROCEDURE — D9220A PRA ANESTHESIA: ICD-10-PCS | Mod: CRNA,,, | Performed by: NURSE ANESTHETIST, CERTIFIED REGISTERED

## 2023-01-26 PROCEDURE — 25000003 PHARM REV CODE 250: Performed by: STUDENT IN AN ORGANIZED HEALTH CARE EDUCATION/TRAINING PROGRAM

## 2023-01-26 PROCEDURE — 82962 GLUCOSE BLOOD TEST: CPT | Performed by: STUDENT IN AN ORGANIZED HEALTH CARE EDUCATION/TRAINING PROGRAM

## 2023-01-26 PROCEDURE — 37000008 HC ANESTHESIA 1ST 15 MINUTES: Performed by: STUDENT IN AN ORGANIZED HEALTH CARE EDUCATION/TRAINING PROGRAM

## 2023-01-26 PROCEDURE — V2632 POST CHMBR INTRAOCULAR LENS: HCPCS | Performed by: STUDENT IN AN ORGANIZED HEALTH CARE EDUCATION/TRAINING PROGRAM

## 2023-01-26 PROCEDURE — 25000003 PHARM REV CODE 250: Performed by: NURSE ANESTHETIST, CERTIFIED REGISTERED

## 2023-01-26 PROCEDURE — 36000707: Performed by: STUDENT IN AN ORGANIZED HEALTH CARE EDUCATION/TRAINING PROGRAM

## 2023-01-26 PROCEDURE — D9220A PRA ANESTHESIA: ICD-10-PCS | Mod: ANES,,, | Performed by: ANESTHESIOLOGY

## 2023-01-26 PROCEDURE — 37000009 HC ANESTHESIA EA ADD 15 MINS: Performed by: STUDENT IN AN ORGANIZED HEALTH CARE EDUCATION/TRAINING PROGRAM

## 2023-01-26 PROCEDURE — 36000706: Performed by: STUDENT IN AN ORGANIZED HEALTH CARE EDUCATION/TRAINING PROGRAM

## 2023-01-26 PROCEDURE — 71000015 HC POSTOP RECOV 1ST HR: Performed by: STUDENT IN AN ORGANIZED HEALTH CARE EDUCATION/TRAINING PROGRAM

## 2023-01-26 DEVICE — LENS EYHANCE +21.0D: Type: IMPLANTABLE DEVICE | Site: EYE | Status: FUNCTIONAL

## 2023-01-26 RX ORDER — LIDOCAINE HYDROCHLORIDE 40 MG/ML
INJECTION, SOLUTION RETROBULBAR
Status: DISCONTINUED | OUTPATIENT
Start: 2023-01-26 | End: 2023-01-26 | Stop reason: HOSPADM

## 2023-01-26 RX ORDER — FENTANYL CITRATE 50 UG/ML
INJECTION, SOLUTION INTRAMUSCULAR; INTRAVENOUS
Status: DISCONTINUED | OUTPATIENT
Start: 2023-01-26 | End: 2023-01-26

## 2023-01-26 RX ORDER — MOXIFLOXACIN 5 MG/ML
SOLUTION/ DROPS OPHTHALMIC
Status: DISCONTINUED
Start: 2023-01-26 | End: 2023-01-26 | Stop reason: HOSPADM

## 2023-01-26 RX ORDER — PREDNISOLONE ACETATE 10 MG/ML
SUSPENSION/ DROPS OPHTHALMIC
Status: DISCONTINUED
Start: 2023-01-26 | End: 2023-01-26 | Stop reason: HOSPADM

## 2023-01-26 RX ORDER — TROPICAMIDE 10 MG/ML
1 SOLUTION/ DROPS OPHTHALMIC
Status: DISCONTINUED | OUTPATIENT
Start: 2023-01-26 | End: 2023-06-07

## 2023-01-26 RX ORDER — ONDANSETRON 2 MG/ML
4 INJECTION INTRAMUSCULAR; INTRAVENOUS DAILY PRN
Status: DISCONTINUED | OUTPATIENT
Start: 2023-01-26 | End: 2023-01-26 | Stop reason: HOSPADM

## 2023-01-26 RX ORDER — MOXIFLOXACIN 5 MG/ML
SOLUTION/ DROPS OPHTHALMIC
Status: DISCONTINUED | OUTPATIENT
Start: 2023-01-26 | End: 2023-01-26 | Stop reason: HOSPADM

## 2023-01-26 RX ORDER — KETOROLAC TROMETHAMINE 5 MG/ML
1 SOLUTION OPHTHALMIC
Status: DISCONTINUED | OUTPATIENT
Start: 2023-01-26 | End: 2023-06-07

## 2023-01-26 RX ORDER — PREDNISOLONE ACETATE 10 MG/ML
1 SUSPENSION/ DROPS OPHTHALMIC 4 TIMES DAILY
Qty: 5 ML | Refills: 0
Start: 2023-01-26 | End: 2023-02-14

## 2023-01-26 RX ORDER — KETOROLAC TROMETHAMINE 5 MG/ML
1 SOLUTION OPHTHALMIC 4 TIMES DAILY
Qty: 5 ML | Refills: 0
Start: 2023-01-26 | End: 2023-02-14

## 2023-01-26 RX ORDER — LIDOCAINE HYDROCHLORIDE 40 MG/ML
INJECTION, SOLUTION RETROBULBAR
Status: DISCONTINUED
Start: 2023-01-26 | End: 2023-01-26 | Stop reason: HOSPADM

## 2023-01-26 RX ORDER — PHENYLEPHRINE HYDROCHLORIDE 25 MG/ML
1 SOLUTION/ DROPS OPHTHALMIC
Status: DISCONTINUED | OUTPATIENT
Start: 2023-01-26 | End: 2023-06-07

## 2023-01-26 RX ORDER — MOXIFLOXACIN 5 MG/ML
1 SOLUTION/ DROPS OPHTHALMIC
Status: DISCONTINUED | OUTPATIENT
Start: 2023-01-26 | End: 2023-06-07

## 2023-01-26 RX ORDER — MIDAZOLAM HYDROCHLORIDE 1 MG/ML
INJECTION, SOLUTION INTRAMUSCULAR; INTRAVENOUS
Status: DISCONTINUED | OUTPATIENT
Start: 2023-01-26 | End: 2023-01-26

## 2023-01-26 RX ORDER — PREDNISOLONE ACETATE 10 MG/ML
SUSPENSION/ DROPS OPHTHALMIC
Status: DISCONTINUED | OUTPATIENT
Start: 2023-01-26 | End: 2023-01-26 | Stop reason: HOSPADM

## 2023-01-26 RX ORDER — LIDOCAINE HYDROCHLORIDE 10 MG/ML
INJECTION INFILTRATION; PERINEURAL
Status: DISCONTINUED
Start: 2023-01-26 | End: 2023-01-26 | Stop reason: HOSPADM

## 2023-01-26 RX ORDER — ACETAMINOPHEN 325 MG/1
650 TABLET ORAL EVERY 4 HOURS PRN
Status: DISCONTINUED | OUTPATIENT
Start: 2023-01-26 | End: 2023-01-26 | Stop reason: HOSPADM

## 2023-01-26 RX ORDER — LIDOCAINE HYDROCHLORIDE 10 MG/ML
INJECTION, SOLUTION EPIDURAL; INFILTRATION; INTRACAUDAL; PERINEURAL
Status: DISCONTINUED
Start: 2023-01-26 | End: 2023-01-26 | Stop reason: HOSPADM

## 2023-01-26 RX ORDER — PROPARACAINE HYDROCHLORIDE 5 MG/ML
1 SOLUTION/ DROPS OPHTHALMIC
Status: DISCONTINUED | OUTPATIENT
Start: 2023-01-26 | End: 2023-06-07

## 2023-01-26 RX ORDER — MOXIFLOXACIN 5 MG/ML
1 SOLUTION/ DROPS OPHTHALMIC 4 TIMES DAILY
Qty: 3 ML | Refills: 0
Start: 2023-01-26 | End: 2023-02-06

## 2023-01-26 RX ORDER — LIDOCAINE HYDROCHLORIDE 10 MG/ML
INJECTION, SOLUTION EPIDURAL; INFILTRATION; INTRACAUDAL; PERINEURAL
Status: DISCONTINUED | OUTPATIENT
Start: 2023-01-26 | End: 2023-01-26 | Stop reason: HOSPADM

## 2023-01-26 RX ADMIN — SODIUM CHLORIDE: 0.9 INJECTION, SOLUTION INTRAVENOUS at 06:01

## 2023-01-26 RX ADMIN — PHENYLEPHRINE HYDROCHLORIDE 1 DROP: 25 SOLUTION/ DROPS OPHTHALMIC at 06:01

## 2023-01-26 RX ADMIN — MIDAZOLAM HYDROCHLORIDE 1 MG: 1 INJECTION, SOLUTION INTRAMUSCULAR; INTRAVENOUS at 06:01

## 2023-01-26 RX ADMIN — KETOROLAC TROMETHAMINE 1 DROP: 5 SOLUTION OPHTHALMIC at 06:01

## 2023-01-26 RX ADMIN — MOXIFLOXACIN OPHTHALMIC 1 DROP: 5 SOLUTION/ DROPS OPHTHALMIC at 06:01

## 2023-01-26 RX ADMIN — MOXIFLOXACIN OPHTHALMIC 1 DROP: 5 SOLUTION/ DROPS OPHTHALMIC at 05:01

## 2023-01-26 RX ADMIN — PROPARACAINE HYDROCHLORIDE 1 DROP: 5 SOLUTION/ DROPS OPHTHALMIC at 05:01

## 2023-01-26 RX ADMIN — FENTANYL CITRATE 25 MCG: 50 INJECTION, SOLUTION INTRAMUSCULAR; INTRAVENOUS at 06:01

## 2023-01-26 RX ADMIN — PHENYLEPHRINE HYDROCHLORIDE 1 DROP: 25 SOLUTION/ DROPS OPHTHALMIC at 05:01

## 2023-01-26 RX ADMIN — KETOROLAC TROMETHAMINE 1 DROP: 5 SOLUTION OPHTHALMIC at 05:01

## 2023-01-26 RX ADMIN — TROPICAMIDE 1 DROP: 10 SOLUTION/ DROPS OPHTHALMIC at 06:01

## 2023-01-26 RX ADMIN — TROPICAMIDE 1 DROP: 10 SOLUTION/ DROPS OPHTHALMIC at 05:01

## 2023-01-26 RX ADMIN — MIDAZOLAM HYDROCHLORIDE 1 MG: 1 INJECTION, SOLUTION INTRAMUSCULAR; INTRAVENOUS at 07:01

## 2023-01-26 NOTE — TRANSFER OF CARE
"Anesthesia Transfer of Care Note    Patient: Carolee Bartlett    Procedure(s) Performed: Procedure(s) (LRB):  PHACOEMULSIFICATION, CATARACT (Right)  INSERTION, IOL PROSTHESIS (Right)    Patient location: PACU    Anesthesia Type: general    Transport from OR: Transported from OR on room air with adequate spontaneous ventilation    Post pain: adequate analgesia    Post assessment: no apparent anesthetic complications    Post vital signs: stable    Level of consciousness: awake    Nausea/Vomiting: no nausea/vomiting    Complications: none    Transfer of care protocol was followed      Last vitals:   Visit Vitals  BP (!) 143/65   Pulse 67   Temp 36.2 °C (97.2 °F)   Resp 16   Ht 5' 2" (1.575 m)   Wt 101.2 kg (223 lb)   SpO2 98%   Breastfeeding No   BMI 40.79 kg/m²     "

## 2023-01-26 NOTE — PROGRESS NOTES
Pt discharged to home . Pt IV removed. Pt has all discharge instructions and personal belongings. Tolerating clear liquids well. No further questions. Adequate for discharge.

## 2023-01-26 NOTE — OP NOTE
Procedure Date 1/26/22    Report Title: Operative Note    Discharge Patient Home      SURGEON: Soniya Macias MD MS    ASSISTANT: -    PREOPERATIVE DIAGNOSIS: Visually significant combined forms of age-related cataract,  Right Eye.    POSTOPERATIVE DIAGNOSIS: Visually-significant combined forms of age-related cataract,  Right Eye.    PROCEDURE PERFORMED: Complex Phacoemulsification of the cataract with posterior chamber intraocular lens  & Trypan Blue Assisted Capsulotomy Right Eye.    ANESTHESIA: Topical with MAC    COMPLICATIONS: None    ESTIMATED BLOOD LOSS: Minimal    INDICATIONS FOR PROCEDURE:   The patient is a pleasant 67 year old with increasing difficulties with activities of daily living secondary to a dense visually significant cataract in the Right Eye.  Discussions have been carried out with this patient concerning the options to surgery, risks, benefits and expectations.  The patient voiced good understanding and wished to proceed with the above procedure.    PROCEDURE IN DETAIL: The patient was brought to the operating room and received topical anesthetic to the eye and then was prepped and draped in the usual sterile fashion.  The Right Eye was first entered at the 7:30 oclock paracentesis site, then due to the density and poor visualization of the cataract this was  followed by intracameral lidocaine, an air bubble, then Trypan blue capsular staining followed by BSS washout and Viscoat material.  The eye was then reentered at the primary surgical site followed by continuous capsulotomy, hydrodissection and phacoemulsification of the cataract.  Residual cortical material was removed using automated irrigation-aspiration technique.  Provisc was injected into the posterior chamber and an EDMUND DIB00 21.0 diopter lens was placed in the bag without difficulty.  Residual Provisc was removed using automated irrigation-aspiration technique. The eye was re-pressurized using BSS solution and both the  paracentesis site and the primary surgical site were demonstrated to be watertight at the end of the case with Weck--Mary manipulation.  Vigamox and Pred Forte eye drops was placed on the cornea.  The eye was closed, and a clear shield placed.  The patient was taken to the recovery room in good and stable condition.  The patient tolerated the procedure well and discharged Home.  The patient was instructed to refrain from any heavy lifting, bending, stooping or straining activities and to follow-up in the morning for routine postoperative care.

## 2023-01-26 NOTE — PATIENT INSTRUCTIONS
Dr. Soniya Macias MD                                   [facility]  Cataract Surgery Post-Operative Instructions                   SURGERY EYE ONLY  Prednisolone acetate (PINK TOP):   SHAKE then one drop 4 TIMES A DAY.  Get new directions next visit     Moxifloxacin (TAN TOP, YELLOW LIQUID):   One drop 4 TIMES A DAY.   Use until the bottle is empty      Ketorolac (GREY TOP):       One drop, 4 TIMES A DAY.   Use until the bottle is empty           Other drops:   ----------------------------------------------------------------------------------------------------  No bending, straining, or lifting more than 10 pounds FOR 1 WEEK  You can shower, but do not get water in the eye  Wear the plastic eye patch when you are sleeping including during naps  Call the clinic if any of the following problems: vision getting a lot worse, bad pain, green pus or discharge from the eye  [phone] or  [phone]

## 2023-01-26 NOTE — DISCHARGE SUMMARY
[facility] - Surgery (1st Fl)  Discharge Note  Short Stay    Procedure(s) (LRB):  PHACOEMULSIFICATION, CATARACT (Right)  INSERTION, IOL PROSTHESIS (Right)      OUTCOME: Patient tolerated treatment/procedure well without complication and is now ready for discharge.    DISPOSITION: Home or Self Care    FINAL DIAGNOSIS:  Combined forms of age-related cataract of right eye    FOLLOWUP: In clinic    DISCHARGE INSTRUCTIONS:    Discharge Procedure Orders   Diet general     Lifting restrictions     Call MD for:  temperature >100.4     Call MD for:  persistent nausea and vomiting     Call MD for:  severe uncontrolled pain     Call MD for:  redness, tenderness, or signs of infection (pain, swelling, redness, odor or green/yellow discharge around incision site)        TIME SPENT ON DISCHARGE: 10 minutes

## 2023-01-26 NOTE — ANESTHESIA PREPROCEDURE EVALUATION
2023  Carolee Bartlett is a 67 y.o., female.  Pre-operative evaluation for PHACOEMULSIFICATION, CATARACT (Right), INSERTION, IOL PROSTHESIS (Right)    Chief Complaint:cataract od  PMH:  preDM on metformin once daily A1c 6.7  HTN at goal on arb   HLD and DM is on nd statin,  up from 97  CMP Microalb last week normal as well, cbc unremarkable   Post ablative hypoth TSH  not done was subclin hyper earlier this year   Past Surgical History:   Procedure Laterality Date    HIP ARTHROPLASTY Right 2018    Procedure: ARTHROPLASTY, HIP, [place], peg board, first assist;  Surgeon: Olayinka Redding MD;  [facility];  Service: Orthopedics;  Laterality: Right;  NOE ORTHO CONFIRMED /DME FIRST ASSISTANT CONFIRMED DME    INTRAOCULAR PROSTHESES INSERTION Left 2022    Procedure: INSERTION, IOL PROSTHESIS;  Surgeon: Soniya Macias MD;  Location: [facility] OR [address] [place] [address];  Service: Ophthalmology;  Laterality: Left;    PHACOEMULSIFICATION OF CATARACT Left 2022    Procedure: PHACOEMULSIFICATION, CATARACT;  Surgeon: Soniya Macias MD;  Location: [facility] OR [address] [place] [address];  Service: Ophthalmology;  Laterality: Left;    TUBAL LIGATION           Vital Signs Range (Last 24H):         CBC:   No results for input(s): WBC, RBC, HGB, HCT, PLT, MCV, MCH, MCHC in the last 720 hours.    CMP: No results for input(s): NA, K, CL, CO2, BUN, CREATININE, GLU, MG, PHOS, CALCIUM, ALBUMIN, PROT, ALKPHOS, ALT, AST, BILITOT in the last 720 hours.    INR:  No results for input(s): PT, INR, PROTIME, APTT in the last 720 hours.      Diagnostic Studies:      EKD Echo:  TTE 2020   CONCLUSIONS    Normal left ventricular size, systolic function and wall thickness, with no regional wall motion abnormalities.    Calculated left ventricular ejection fraction by 2D tracking is 68%.    Grade I/IV diastolic  dysfunction.    Normal right ventricular size and systolic function.    Normal left atrial size. Left atrial volume index 28 ml/m².    No significant valve abnormalities.     Mil Aortic insufficiencyNo pericardial or pleural effusion.    Normal aorta size at the level of the sinus of valsalva  Pre-op Assessment          Review of Systems         Anesthesia Plan  Type of Anesthesia, risks & benefits discussed:    Anesthesia Type: MAC  Intra-op Monitoring Plan: Standard ASA Monitors  Post Op Pain Control Plan: multimodal analgesia  Induction:  IV  Informed Consent: Informed consent signed with the Patient and all parties understand the risks and agree with anesthesia plan.  All questions answered.   ASA Score: 2  Day of Surgery Review of History & Physical: H&P Update referred to the surgeon/provider.    Ready For Surgery From Anesthesia Perspective.     .

## 2023-01-26 NOTE — PROGRESS NOTES
Subjective:       Patient ID: Carolee Bartlett is a 67 y.o. female.    Chief Complaint: Post-op Evaluation (1 day po)     HPI     Post-op Evaluation            Comments: 1 day po          Comments    Patient states the right eye is feeling well today. No pain. No discharge.       S/P Phacoemulsification of the cataract with posterior chamber intraocular   lens Right Eye 1/26/2023  S/P Phacoemulsification of the cataract with posterior chamber intraocular   lens Left Eye 09/08/2022    Latanoprost QHS OU  PF QID OD  Ketorolac QID OD  Moxi QID OD          Last edited by Omi Najera on 1/27/2023  7:56 AM.            Assessment & Plan   Pseudophakia, both eyes    Keratoconjunctivitis sicca of both eyes    Chronic angle-closure glaucoma of both eyes, severe stage     POD#1 CE/IOL OD  IOL DIBOO  +21.00 target -0.13   VA OK   IOP OK  Cornea clear  Continue present management with eye drops in surgery eye   PF QID    Ketorolac QID    Vigamox QID   No lifting over 10 pounds x 1 week  No bending, no straining  OK to shower, but no water in the eye  Discussed if any worsening vision, worsening pain, discharge or drainage, call immediately  Post op handout given and all questions answered    Pseudophakia OS  DIBOO +21.50 target -0.05   Need DFE    CACG severe OU  Phacomorphic narrowing - angle quite narrow OU  -Fhx (+Mother, +Brother), Steroids (),Trauma()  -Drops: Latanoprost qHS OU (started 2/21/22)  -Drop intolerance/contraindication: -  -Laser:-  -Surgeries: CE IOL OU  -CCT: 605 // 640 thick  -Gonio: TM OU ---> ___  Tm 21//23 2/22 HVF SAD/IAD OU with high FN   2/22 RNFL Dec TI OD // Dec G/TS/TI B T OS       PRASHANTH OU  Started Xiidra 2/21/22  Pres free AT's about 6 x day   Gel drops or ointment at night   Consider punctal plugs / doxy       Graves disease  No overt E/o ELMER on exam   Monitor     Eyelid ptosis  Hemifacial spasm / synkinesis  Dr. Burden    PLAN  Cont Latanoprost  Cont AT and Xiidra  Post op gtts as above            RTC 1 week IOP, gonio          Soniya Macias M.D., M.S.  Department of Ophthalmology   Division of Glaucoma Surgery  [facility]

## 2023-01-26 NOTE — ANESTHESIA POSTPROCEDURE EVALUATION
Anesthesia Post Evaluation    Patient: Carolee Bartlett    Procedure(s) Performed: Procedure(s) (LRB):  PHACOEMULSIFICATION, CATARACT (Right)  INSERTION, IOL PROSTHESIS (Right)    Final Anesthesia Type: MAC      Patient location during evaluation: PACU  Patient participation: Yes- Able to Participate  Level of consciousness: awake and alert and oriented  Post-procedure vital signs: reviewed and stable  Pain management: adequate  Airway patency: patent    PONV status at discharge: No PONV  Anesthetic complications: no      Cardiovascular status: hemodynamically stable  Respiratory status: unassisted  Hydration status: euvolemic  Follow-up not needed.          Vitals Value Taken Time   /58 01/26/23 0815   Temp 36.2 °C (97.2 °F) 01/26/23 0815   Pulse 59 01/26/23 0815   Resp 16 01/26/23 0815   SpO2 100 % 01/26/23 0815         No case tracking events are documented in the log.      Pain/Everett Score: Everett Score: 10 (1/26/2023  8:15 AM)

## 2023-01-27 ENCOUNTER — OFFICE VISIT (OUTPATIENT)
Dept: OPHTHALMOLOGY | Facility: CLINIC | Age: 68
End: 2023-01-27
Payer: MEDICARE

## 2023-01-27 DIAGNOSIS — M35.01 KERATOCONJUNCTIVITIS SICCA OF BOTH EYES: ICD-10-CM

## 2023-01-27 DIAGNOSIS — H40.2233 CHRONIC ANGLE-CLOSURE GLAUCOMA OF BOTH EYES, SEVERE STAGE: ICD-10-CM

## 2023-01-27 DIAGNOSIS — Z96.1 PSEUDOPHAKIA, BOTH EYES: Primary | ICD-10-CM

## 2023-01-27 PROCEDURE — 99499 UNLISTED E&M SERVICE: CPT | Mod: S$GLB,,, | Performed by: STUDENT IN AN ORGANIZED HEALTH CARE EDUCATION/TRAINING PROGRAM

## 2023-01-27 PROCEDURE — 1159F PR MEDICATION LIST DOCUMENTED IN MEDICAL RECORD: ICD-10-PCS | Mod: CPTII,S$GLB,, | Performed by: STUDENT IN AN ORGANIZED HEALTH CARE EDUCATION/TRAINING PROGRAM

## 2023-01-27 PROCEDURE — 1126F AMNT PAIN NOTED NONE PRSNT: CPT | Mod: CPTII,S$GLB,, | Performed by: STUDENT IN AN ORGANIZED HEALTH CARE EDUCATION/TRAINING PROGRAM

## 2023-01-27 PROCEDURE — 1160F PR REVIEW ALL MEDS BY PRESCRIBER/CLIN PHARMACIST DOCUMENTED: ICD-10-PCS | Mod: CPTII,S$GLB,, | Performed by: STUDENT IN AN ORGANIZED HEALTH CARE EDUCATION/TRAINING PROGRAM

## 2023-01-27 PROCEDURE — 99999 PR PBB SHADOW E&M-EST. PATIENT-LVL II: ICD-10-PCS | Mod: PBBFAC,,, | Performed by: STUDENT IN AN ORGANIZED HEALTH CARE EDUCATION/TRAINING PROGRAM

## 2023-01-27 PROCEDURE — 3288F PR FALLS RISK ASSESSMENT DOCUMENTED: ICD-10-PCS | Mod: CPTII,S$GLB,, | Performed by: STUDENT IN AN ORGANIZED HEALTH CARE EDUCATION/TRAINING PROGRAM

## 2023-01-27 PROCEDURE — 1160F RVW MEDS BY RX/DR IN RCRD: CPT | Mod: CPTII,S$GLB,, | Performed by: STUDENT IN AN ORGANIZED HEALTH CARE EDUCATION/TRAINING PROGRAM

## 2023-01-27 PROCEDURE — 1159F MED LIST DOCD IN RCRD: CPT | Mod: CPTII,S$GLB,, | Performed by: STUDENT IN AN ORGANIZED HEALTH CARE EDUCATION/TRAINING PROGRAM

## 2023-01-27 PROCEDURE — 99999 PR PBB SHADOW E&M-EST. PATIENT-LVL II: CPT | Mod: PBBFAC,,, | Performed by: STUDENT IN AN ORGANIZED HEALTH CARE EDUCATION/TRAINING PROGRAM

## 2023-01-27 PROCEDURE — 1101F PR PT FALLS ASSESS DOC 0-1 FALLS W/OUT INJ PAST YR: ICD-10-PCS | Mod: CPTII,S$GLB,, | Performed by: STUDENT IN AN ORGANIZED HEALTH CARE EDUCATION/TRAINING PROGRAM

## 2023-01-27 PROCEDURE — 99024 PR POST-OP FOLLOW-UP VISIT: ICD-10-PCS | Mod: S$GLB,,, | Performed by: STUDENT IN AN ORGANIZED HEALTH CARE EDUCATION/TRAINING PROGRAM

## 2023-01-27 PROCEDURE — 99499 RISK ADDL DX/OHS AUDIT: ICD-10-PCS | Mod: S$GLB,,, | Performed by: STUDENT IN AN ORGANIZED HEALTH CARE EDUCATION/TRAINING PROGRAM

## 2023-01-27 PROCEDURE — 1101F PT FALLS ASSESS-DOCD LE1/YR: CPT | Mod: CPTII,S$GLB,, | Performed by: STUDENT IN AN ORGANIZED HEALTH CARE EDUCATION/TRAINING PROGRAM

## 2023-01-27 PROCEDURE — 1126F PR PAIN SEVERITY QUANTIFIED, NO PAIN PRESENT: ICD-10-PCS | Mod: CPTII,S$GLB,, | Performed by: STUDENT IN AN ORGANIZED HEALTH CARE EDUCATION/TRAINING PROGRAM

## 2023-01-27 PROCEDURE — 3288F FALL RISK ASSESSMENT DOCD: CPT | Mod: CPTII,S$GLB,, | Performed by: STUDENT IN AN ORGANIZED HEALTH CARE EDUCATION/TRAINING PROGRAM

## 2023-01-27 PROCEDURE — 99024 POSTOP FOLLOW-UP VISIT: CPT | Mod: S$GLB,,, | Performed by: STUDENT IN AN ORGANIZED HEALTH CARE EDUCATION/TRAINING PROGRAM

## 2023-01-31 ENCOUNTER — TELEPHONE (OUTPATIENT)
Dept: OPHTHALMOLOGY | Facility: CLINIC | Age: 68
End: 2023-01-31
Payer: MEDICAID

## 2023-01-31 NOTE — TELEPHONE ENCOUNTER
----- Message from Soniya Rose sent at 1/31/2023 10:02 AM CST -----  Contact: Carolee @[phone]  Pt needs a call back to schedule her post op appt. Please give her a call back to further assist.

## 2023-02-07 NOTE — PROGRESS NOTES
Subjective:       Patient ID: Carolee Bartlett is a 68 y.o. female.    Chief Complaint: Post-op Evaluation     HPI    DLS: 1/27/2023    Pt here for 1 week post phaco w/IOL OD- 1/26/2023  S/P phaco w/IOL OS- 9/08/2022    Pt states no eye pain or discomfort.     Meds;  Latanoprost QHS OU  PF TID OD  Ketorolac TID OD  Xiidra BID OU  Systane 3-4 x DAY OU  Last edited by Camila Ramos on 2/8/2023 10:34 AM.            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 combination mechanisms       POW#2 CE/IOL OD  IOL DIBOO  +21.00 target -0.13   VA OK   IOP OK  Cornea clear  Drops   Taper PF 3/2/1/0   Ketorolac QID until out   Vigamox QID  until out  Return to normal activities  Can stop wearing eye shield at night  Discussed if any worsening vision, worsening pain, discharge or drainage, call immediately  Post op handout given and all questions answered    Pseudophakia OS  DIBOO +21.50 target -0.05   Need DFE    MMG severe OU   Was CACG now post CE  -Fhx (+Mother, +Brother), Steroids (),Trauma()  -Drops: Latanoprost qHS OU (started 2/21/22)  -Drop intolerance/contraindication: -  -Laser:-  -Surgeries: CE IOL OU  -CCT: 605 // 640 thick  -Gonio: TM OU ---> SS with pigment  Tm 21//23 2/22 HVF SAD/IAD OU with high FN   2/22 RNFL Dec TI OD // Dec G/TS/TI B T OS       PRASHANTH OU  Started Xiidra 2/21/22  Pres free AT's about 6 x day   Gel drops or ointment at night   Consider punctal plugs / doxy       Graves disease  No overt E/o ELMER on exam   Monitor     Eyelid ptosis  Hemifacial spasm / synkinesis  Dr. Burden    PLAN  Cont Latanoprost  Cont AT and Xiidra  Post op gtts as above    Get testing in 4 months - HVF and OCT then likely SLT         RTC 1 month Mrx and DFE OU     \    Soniya Macias M.D., M.S.  Department of Ophthalmology   Division of Glaucoma Surgery  [facility]

## 2023-02-08 ENCOUNTER — OFFICE VISIT (OUTPATIENT)
Dept: OPHTHALMOLOGY | Facility: CLINIC | Age: 68
End: 2023-02-08
Payer: MEDICARE

## 2023-02-08 DIAGNOSIS — H40.53X3 SEVERE STAGE SECONDARY GLAUCOMA OF BOTH EYES DUE TO COMBINATION MECHANISMS: ICD-10-CM

## 2023-02-08 DIAGNOSIS — Z96.1 PSEUDOPHAKIA, BOTH EYES: Primary | ICD-10-CM

## 2023-02-08 PROCEDURE — 1159F PR MEDICATION LIST DOCUMENTED IN MEDICAL RECORD: ICD-10-PCS | Mod: CPTII,S$GLB,, | Performed by: STUDENT IN AN ORGANIZED HEALTH CARE EDUCATION/TRAINING PROGRAM

## 2023-02-08 PROCEDURE — 1126F PR PAIN SEVERITY QUANTIFIED, NO PAIN PRESENT: ICD-10-PCS | Mod: CPTII,S$GLB,, | Performed by: STUDENT IN AN ORGANIZED HEALTH CARE EDUCATION/TRAINING PROGRAM

## 2023-02-08 PROCEDURE — 99999 PR PBB SHADOW E&M-EST. PATIENT-LVL II: CPT | Mod: PBBFAC,,, | Performed by: STUDENT IN AN ORGANIZED HEALTH CARE EDUCATION/TRAINING PROGRAM

## 2023-02-08 PROCEDURE — 3288F FALL RISK ASSESSMENT DOCD: CPT | Mod: CPTII,S$GLB,, | Performed by: STUDENT IN AN ORGANIZED HEALTH CARE EDUCATION/TRAINING PROGRAM

## 2023-02-08 PROCEDURE — 1160F RVW MEDS BY RX/DR IN RCRD: CPT | Mod: CPTII,S$GLB,, | Performed by: STUDENT IN AN ORGANIZED HEALTH CARE EDUCATION/TRAINING PROGRAM

## 2023-02-08 PROCEDURE — 99024 PR POST-OP FOLLOW-UP VISIT: ICD-10-PCS | Mod: S$GLB,,, | Performed by: STUDENT IN AN ORGANIZED HEALTH CARE EDUCATION/TRAINING PROGRAM

## 2023-02-08 PROCEDURE — 1159F MED LIST DOCD IN RCRD: CPT | Mod: CPTII,S$GLB,, | Performed by: STUDENT IN AN ORGANIZED HEALTH CARE EDUCATION/TRAINING PROGRAM

## 2023-02-08 PROCEDURE — 1101F PR PT FALLS ASSESS DOC 0-1 FALLS W/OUT INJ PAST YR: ICD-10-PCS | Mod: CPTII,S$GLB,, | Performed by: STUDENT IN AN ORGANIZED HEALTH CARE EDUCATION/TRAINING PROGRAM

## 2023-02-08 PROCEDURE — 99024 POSTOP FOLLOW-UP VISIT: CPT | Mod: S$GLB,,, | Performed by: STUDENT IN AN ORGANIZED HEALTH CARE EDUCATION/TRAINING PROGRAM

## 2023-02-08 PROCEDURE — 1160F PR REVIEW ALL MEDS BY PRESCRIBER/CLIN PHARMACIST DOCUMENTED: ICD-10-PCS | Mod: CPTII,S$GLB,, | Performed by: STUDENT IN AN ORGANIZED HEALTH CARE EDUCATION/TRAINING PROGRAM

## 2023-02-08 PROCEDURE — 1101F PT FALLS ASSESS-DOCD LE1/YR: CPT | Mod: CPTII,S$GLB,, | Performed by: STUDENT IN AN ORGANIZED HEALTH CARE EDUCATION/TRAINING PROGRAM

## 2023-02-08 PROCEDURE — 1126F AMNT PAIN NOTED NONE PRSNT: CPT | Mod: CPTII,S$GLB,, | Performed by: STUDENT IN AN ORGANIZED HEALTH CARE EDUCATION/TRAINING PROGRAM

## 2023-02-08 PROCEDURE — 99999 PR PBB SHADOW E&M-EST. PATIENT-LVL II: ICD-10-PCS | Mod: PBBFAC,,, | Performed by: STUDENT IN AN ORGANIZED HEALTH CARE EDUCATION/TRAINING PROGRAM

## 2023-02-08 PROCEDURE — 3288F PR FALLS RISK ASSESSMENT DOCUMENTED: ICD-10-PCS | Mod: CPTII,S$GLB,, | Performed by: STUDENT IN AN ORGANIZED HEALTH CARE EDUCATION/TRAINING PROGRAM

## 2023-03-14 ENCOUNTER — TELEPHONE (OUTPATIENT)
Dept: ENDOSCOPY | Facility: HOSPITAL | Age: 68
End: 2023-03-14
Payer: MEDICARE

## 2023-03-15 ENCOUNTER — PATIENT MESSAGE (OUTPATIENT)
Dept: ENDOSCOPY | Facility: HOSPITAL | Age: 68
End: 2023-03-15
Payer: MEDICARE

## 2023-03-15 DIAGNOSIS — Z00.00 WELLNESS EXAMINATION: Primary | ICD-10-CM

## 2023-03-15 DIAGNOSIS — Z12.11 COLON CANCER SCREENING: ICD-10-CM

## 2023-03-22 ENCOUNTER — OFFICE VISIT (OUTPATIENT)
Dept: OPHTHALMOLOGY | Facility: CLINIC | Age: 68
End: 2023-03-22
Payer: MEDICARE

## 2023-03-22 DIAGNOSIS — Z96.1 PSEUDOPHAKIA, BOTH EYES: Primary | ICD-10-CM

## 2023-03-22 DIAGNOSIS — H02.403 PTOSIS, BILATERAL: ICD-10-CM

## 2023-03-22 DIAGNOSIS — H40.53X3 SEVERE STAGE SECONDARY GLAUCOMA OF BOTH EYES DUE TO COMBINATION MECHANISMS: ICD-10-CM

## 2023-03-22 DIAGNOSIS — M35.01 KERATOCONJUNCTIVITIS SICCA OF BOTH EYES: ICD-10-CM

## 2023-03-22 DIAGNOSIS — G51.31 HEMIFACIAL SPASM OF RIGHT SIDE OF FACE: ICD-10-CM

## 2023-03-22 PROCEDURE — 99024 PR POST-OP FOLLOW-UP VISIT: ICD-10-PCS | Mod: S$GLB,,, | Performed by: STUDENT IN AN ORGANIZED HEALTH CARE EDUCATION/TRAINING PROGRAM

## 2023-03-22 PROCEDURE — 99499 UNLISTED E&M SERVICE: CPT | Mod: S$GLB,,, | Performed by: STUDENT IN AN ORGANIZED HEALTH CARE EDUCATION/TRAINING PROGRAM

## 2023-03-22 PROCEDURE — 3288F PR FALLS RISK ASSESSMENT DOCUMENTED: ICD-10-PCS | Mod: CPTII,S$GLB,, | Performed by: STUDENT IN AN ORGANIZED HEALTH CARE EDUCATION/TRAINING PROGRAM

## 2023-03-22 PROCEDURE — 1159F PR MEDICATION LIST DOCUMENTED IN MEDICAL RECORD: ICD-10-PCS | Mod: CPTII,S$GLB,, | Performed by: STUDENT IN AN ORGANIZED HEALTH CARE EDUCATION/TRAINING PROGRAM

## 2023-03-22 PROCEDURE — 1101F PR PT FALLS ASSESS DOC 0-1 FALLS W/OUT INJ PAST YR: ICD-10-PCS | Mod: CPTII,S$GLB,, | Performed by: STUDENT IN AN ORGANIZED HEALTH CARE EDUCATION/TRAINING PROGRAM

## 2023-03-22 PROCEDURE — 3288F FALL RISK ASSESSMENT DOCD: CPT | Mod: CPTII,S$GLB,, | Performed by: STUDENT IN AN ORGANIZED HEALTH CARE EDUCATION/TRAINING PROGRAM

## 2023-03-22 PROCEDURE — 1159F MED LIST DOCD IN RCRD: CPT | Mod: CPTII,S$GLB,, | Performed by: STUDENT IN AN ORGANIZED HEALTH CARE EDUCATION/TRAINING PROGRAM

## 2023-03-22 PROCEDURE — 99024 POSTOP FOLLOW-UP VISIT: CPT | Mod: S$GLB,,, | Performed by: STUDENT IN AN ORGANIZED HEALTH CARE EDUCATION/TRAINING PROGRAM

## 2023-03-22 PROCEDURE — 1101F PT FALLS ASSESS-DOCD LE1/YR: CPT | Mod: CPTII,S$GLB,, | Performed by: STUDENT IN AN ORGANIZED HEALTH CARE EDUCATION/TRAINING PROGRAM

## 2023-03-22 PROCEDURE — 1126F PR PAIN SEVERITY QUANTIFIED, NO PAIN PRESENT: ICD-10-PCS | Mod: CPTII,S$GLB,, | Performed by: STUDENT IN AN ORGANIZED HEALTH CARE EDUCATION/TRAINING PROGRAM

## 2023-03-22 PROCEDURE — 99999 PR PBB SHADOW E&M-EST. PATIENT-LVL II: ICD-10-PCS | Mod: PBBFAC,,, | Performed by: STUDENT IN AN ORGANIZED HEALTH CARE EDUCATION/TRAINING PROGRAM

## 2023-03-22 PROCEDURE — 1160F PR REVIEW ALL MEDS BY PRESCRIBER/CLIN PHARMACIST DOCUMENTED: ICD-10-PCS | Mod: CPTII,S$GLB,, | Performed by: STUDENT IN AN ORGANIZED HEALTH CARE EDUCATION/TRAINING PROGRAM

## 2023-03-22 PROCEDURE — 1126F AMNT PAIN NOTED NONE PRSNT: CPT | Mod: CPTII,S$GLB,, | Performed by: STUDENT IN AN ORGANIZED HEALTH CARE EDUCATION/TRAINING PROGRAM

## 2023-03-22 PROCEDURE — 99999 PR PBB SHADOW E&M-EST. PATIENT-LVL II: CPT | Mod: PBBFAC,,, | Performed by: STUDENT IN AN ORGANIZED HEALTH CARE EDUCATION/TRAINING PROGRAM

## 2023-03-22 PROCEDURE — 1160F RVW MEDS BY RX/DR IN RCRD: CPT | Mod: CPTII,S$GLB,, | Performed by: STUDENT IN AN ORGANIZED HEALTH CARE EDUCATION/TRAINING PROGRAM

## 2023-03-24 ENCOUNTER — ANESTHESIA EVENT (OUTPATIENT)
Dept: ENDOSCOPY | Facility: HOSPITAL | Age: 68
End: 2023-03-24
Payer: MEDICARE

## 2023-03-24 NOTE — ANESTHESIA PREPROCEDURE EVALUATION
03/24/2023  Carolee Bartlett is a 68 y.o., female.    Patient Active Problem List   Diagnosis    Avascular necrosis of right femur    S/P total hip arthroplasty    Postablative hypothyroidism    Impaired glucose tolerance    Hyperlipidemia    Hypertension    Muscle cramps    Glaucoma    Hip pain     Past Medical History:   Diagnosis Date    Avascular necrosis     Cataract 09/09/2022    left    Diabetes mellitus     Encounter for blood transfusion     Glaucoma     Graves disease     Hypertension     Other abnormal glucose     Pre-Diabetic    Sciatica      Past Medical History:   Diagnosis Date    Avascular necrosis     Cataract 09/09/2022    left    Diabetes mellitus     Encounter for blood transfusion     Glaucoma     Graves disease     Hypertension     Other abnormal glucose     Pre-Diabetic    Sciatica      Past Surgical History:   Procedure Laterality Date    HIP ARTHROPLASTY Right 11/26/2018    Procedure: ARTHROPLASTY, HIP, [place], peg board, first assist;  Surgeon: Olayinka Redding MD;  Location: [facility] OR;  Service: Orthopedics;  Laterality: Right;  NOE ORTHO CONFIRMED 11/20/DME FIRST ASSISTANT CONFIRMED 11/21/DME    HIP REPLACEMENT ARTHROPLASTY Right     INTRAOCULAR PROSTHESES INSERTION Left 09/08/2022    Procedure: INSERTION, IOL PROSTHESIS;  Surgeon: Soniya Macias MD;  Location: [facility] OR [address] [place] [address];  Service: Ophthalmology;  Laterality: Left;    INTRAOCULAR PROSTHESES INSERTION Right 1/26/2023    Procedure: INSERTION, IOL PROSTHESIS;  Surgeon: Soniya Macias MD;  [facility];  Service: Ophthalmology;  Laterality: Right;    PHACOEMULSIFICATION OF CATARACT Left 09/08/2022    Procedure: PHACOEMULSIFICATION, CATARACT;  Surgeon: Soniya Macias MD;  [facility];  Service: Ophthalmology;  Laterality: Left;    PHACOEMULSIFICATION OF  CATARACT Right 1/26/2023    Procedure: PHACOEMULSIFICATION, CATARACT;  Surgeon: Soniya Macias MD;  Location: [facility] OR [address] [place] [address];  Service: Ophthalmology;  Laterality: Right;    TUBAL LIGATION           Pre-op Assessment    I have reviewed the Patient Summary Reports.    I have reviewed the NPO Status.   I have reviewed the Medications.     Review of Systems  Anesthesia Hx:  No problems with previous Anesthesia  History of prior surgery of interest to airway management or planning:   Social:  Smoker    Cardiovascular:   Hypertension    Neurological:   Neuromuscular Disease,    Endocrine:   Diabetes Hypothyroidism        Physical Exam  General: Well nourished, Cooperative, Alert and Oriented    Airway:  Mallampati: II   TM Distance: Normal  Tongue: Normal  Neck ROM: Normal ROM        Anesthesia Plan  Type of Anesthesia, risks & benefits discussed:    Anesthesia Type: Gen Natural Airway, MAC  Intra-op Monitoring Plan: Standard ASA Monitors  Induction:  IV  Informed Consent: Informed consent signed with the Patient and all parties understand the risks and agree with anesthesia plan.  All questions answered.   ASA Score: 3  Day of Surgery Review of History & Physical: H&P Update referred to the surgeon/provider.    Ready For Surgery From Anesthesia Perspective.     .

## 2023-03-27 ENCOUNTER — HOSPITAL ENCOUNTER (OUTPATIENT)
Facility: HOSPITAL | Age: 68
Discharge: HOME | End: 2023-03-27
Attending: INTERNAL MEDICINE | Admitting: INTERNAL MEDICINE
Payer: MEDICARE

## 2023-03-27 ENCOUNTER — ANESTHESIA (OUTPATIENT)
Dept: ENDOSCOPY | Facility: HOSPITAL | Age: 68
End: 2023-03-27
Payer: MEDICARE

## 2023-03-27 VITALS
SYSTOLIC BLOOD PRESSURE: 131 MMHG | HEART RATE: 67 BPM | WEIGHT: 223 LBS | DIASTOLIC BLOOD PRESSURE: 64 MMHG | TEMPERATURE: 97 F | BODY MASS INDEX: 41.04 KG/M2 | HEIGHT: 62 IN | RESPIRATION RATE: 17 BRPM | OXYGEN SATURATION: 100 %

## 2023-03-27 DIAGNOSIS — Z12.11 SCREEN FOR COLON CANCER: ICD-10-CM

## 2023-03-27 DIAGNOSIS — Z12.11 COLON CANCER SCREENING: Primary | ICD-10-CM

## 2023-03-27 LAB — GLUCOSE SERPL-MCNC: 139 MG/DL (ref 70–110)

## 2023-03-27 PROCEDURE — 37000008 HC ANESTHESIA 1ST 15 MINUTES: Performed by: INTERNAL MEDICINE

## 2023-03-27 PROCEDURE — 88305 TISSUE EXAM BY PATHOLOGIST: CPT | Performed by: PATHOLOGY

## 2023-03-27 PROCEDURE — 82962 GLUCOSE BLOOD TEST: CPT | Performed by: INTERNAL MEDICINE

## 2023-03-27 PROCEDURE — 94761 N-INVAS EAR/PLS OXIMETRY MLT: CPT

## 2023-03-27 PROCEDURE — 45385 COLONOSCOPY W/LESION REMOVAL: CPT | Mod: PT,,, | Performed by: INTERNAL MEDICINE

## 2023-03-27 PROCEDURE — 63600175 PHARM REV CODE 636 W HCPCS: Performed by: REGISTERED NURSE

## 2023-03-27 PROCEDURE — 25000003 PHARM REV CODE 250: Performed by: REGISTERED NURSE

## 2023-03-27 PROCEDURE — 88305 TISSUE EXAM BY PATHOLOGIST: ICD-10-PCS | Mod: 26,,, | Performed by: PATHOLOGY

## 2023-03-27 PROCEDURE — D9220A PRA ANESTHESIA: ICD-10-PCS | Mod: PT,CRNA,, | Performed by: REGISTERED NURSE

## 2023-03-27 PROCEDURE — 27201089 HC SNARE, DISP (ANY): Performed by: INTERNAL MEDICINE

## 2023-03-27 PROCEDURE — D9220A PRA ANESTHESIA: Mod: PT,ANES,, | Performed by: ANESTHESIOLOGY

## 2023-03-27 PROCEDURE — 88305 TISSUE EXAM BY PATHOLOGIST: CPT | Mod: 26,,, | Performed by: PATHOLOGY

## 2023-03-27 PROCEDURE — 45385 PR COLONOSCOPY,REMV LESN,SNARE: ICD-10-PCS | Mod: PT,,, | Performed by: INTERNAL MEDICINE

## 2023-03-27 PROCEDURE — 45385 COLONOSCOPY W/LESION REMOVAL: CPT | Mod: PT | Performed by: INTERNAL MEDICINE

## 2023-03-27 PROCEDURE — D9220A PRA ANESTHESIA: ICD-10-PCS | Mod: PT,ANES,, | Performed by: ANESTHESIOLOGY

## 2023-03-27 PROCEDURE — 99900035 HC TECH TIME PER 15 MIN (STAT)

## 2023-03-27 PROCEDURE — D9220A PRA ANESTHESIA: Mod: PT,CRNA,, | Performed by: REGISTERED NURSE

## 2023-03-27 PROCEDURE — 37000009 HC ANESTHESIA EA ADD 15 MINS: Performed by: INTERNAL MEDICINE

## 2023-03-27 RX ORDER — LIDOCAINE HYDROCHLORIDE 20 MG/ML
INJECTION INTRAVENOUS
Status: DISCONTINUED | OUTPATIENT
Start: 2023-03-27 | End: 2023-03-27

## 2023-03-27 RX ORDER — PROPOFOL 10 MG/ML
VIAL (ML) INTRAVENOUS CONTINUOUS PRN
Status: DISCONTINUED | OUTPATIENT
Start: 2023-03-27 | End: 2023-03-27

## 2023-03-27 RX ORDER — PROPOFOL 10 MG/ML
VIAL (ML) INTRAVENOUS
Status: DISCONTINUED | OUTPATIENT
Start: 2023-03-27 | End: 2023-03-27

## 2023-03-27 RX ADMIN — SODIUM CHLORIDE: 0.9 INJECTION, SOLUTION INTRAVENOUS at 11:03

## 2023-03-27 RX ADMIN — Medication 150 MCG/KG/MIN: at 11:03

## 2023-03-27 RX ADMIN — LIDOCAINE HYDROCHLORIDE 100 MG: 20 INJECTION INTRAVENOUS at 11:03

## 2023-03-27 RX ADMIN — PROPOFOL 70 MG: 10 INJECTION, EMULSION INTRAVENOUS at 11:03

## 2023-03-27 NOTE — PROVATION PATIENT INSTRUCTIONS
Discharge Summary/Instructions after an Endoscopic Procedure  Patient Name: Carolee Bartlett  Patient MRN: [id number]  Patient YOB: 1955 Monday, March 27, 2023  Yohannes Francois MD  Dear patient,  As a result of recent federal legislation (The Federal Cures Act), you may   receive lab or pathology results from your procedure in your [facility]   account before your physician is able to contact you. Your physician or   their representative will relay the results to you with their   recommendations at their soonest availability.  Thank you,  RESTRICTIONS:  During your procedure today, you received medications for sedation.  These   medications may affect your judgment, balance and coordination.  Therefore,   for 24 hours, you have the following restrictions:   - DO NOT drive a car, operate machinery, make legal/financial decisions,   sign important papers or drink alcohol.    ACTIVITY:  Today: no heavy lifting, straining or running due to procedural   sedation/anesthesia.  The following day: return to full activity including work.  DIET:  Eat and drink normally unless instructed otherwise.     TREATMENT FOR COMMON SIDE EFFECTS:  - Mild abdominal pain, nausea, belching, bloating or excessive gas:  rest,   eat lightly and use a heating pad.  - Sore Throat: treat with throat lozenges and/or gargle with warm salt   water.  - Because air was used during the procedure, expelling large amounts of air   from your rectum or belching is normal.  - If a bowel prep was taken, you may not have a bowel movement for 1-3 days.    This is normal.  SYMPTOMS TO WATCH FOR AND REPORT TO YOUR PHYSICIAN:  1. Abdominal pain or bloating, other than gas cramps.  2. Chest pain.  3. Back pain.  4. Signs of infection such as: chills or fever occurring within 24 hours   after the procedure.  5. Rectal bleeding, which would show as bright red, maroon, or black stools.   (A tablespoon of blood from the rectum is not serious, especially if    hemorrhoids are present.)  6. Vomiting.  7. Weakness or dizziness.  GO DIRECTLY TO THE NEAREST EMERGENCY ROOM IF YOU HAVE ANY OF THE FOLLOWING:      Difficulty breathing              Chills and/or fever over 101 F   Persistent vomiting and/or vomiting blood   Severe abdominal pain   Severe chest pain   Black, tarry stools   Bleeding- more than one tablespoon   Any other symptom or condition that you feel may need urgent attention  Your doctor recommends these additional instructions:  If any biopsies were taken, your doctors clinic will contact you in 1 to 2   weeks with any results.  - Patient has a contact number available for emergencies.  The signs and   symptoms of potential delayed complications were discussed with the   patient.  Return to normal activities tomorrow.  Written discharge   instructions were provided to the patient.   - Discharge patient to home.   - Resume previous diet.   - Continue present medications.   - Await pathology results.   - Repeat colonoscopy in 5 years for surveillance.   For questions, problems or results please call your physician - Yohannes Francois MD at Work:  [phone].  [facility], EMERGENCY ROOM PHONE NUMBER: [phone]  IF A COMPLICATION OR EMERGENCY SITUATION ARISES AND YOU ARE UNABLE TO REACH   YOUR PHYSICIAN - GO DIRECTLY TO THE EMERGENCY ROOM.  Yohannes Francois MD  3/27/2023 11:41:53 AM  This report has been verified and signed electronically.  Dear patient,  As a result of recent federal legislation (The Federal Cures Act), you may   receive lab or pathology results from your procedure in your [facility]   account before your physician is able to contact you. Your physician or   their representative will relay the results to you with their   recommendations at their soonest availability.  Thank you,  PROVATION

## 2023-03-27 NOTE — TRANSFER OF CARE
"Anesthesia Transfer of Care Note    Patient: Carolee Bartlett    Procedure(s) Performed: Procedure(s) (LRB):  COLONOSCOPY (N/A)    Patient location: PACU    Anesthesia Type: general    Transport from OR: Transported from OR on room air with adequate spontaneous ventilation    Post pain: adequate analgesia    Post assessment: no apparent anesthetic complications and tolerated procedure well    Post vital signs: stable    Level of consciousness: awake    Nausea/Vomiting: no nausea/vomiting    Complications: none    Transfer of care protocol was followed      Last vitals:   Visit Vitals  /61 (BP Location: Left arm, Patient Position: Sitting)   Pulse 74   Temp 36.3 °C (97.3 °F) (Temporal)   Resp 20   Ht 5' 2" (1.575 m)   Wt 101.2 kg (223 lb)   SpO2 100%   Breastfeeding No   BMI 40.79 kg/m²     " DISCHARGE

## 2023-03-27 NOTE — PLAN OF CARE
Pre-op care completed. Personal belongings placed in locker #6.  Call light  in reach. Instructed to call for assistance with understanding verbalized.

## 2023-03-27 NOTE — H&P
Short Stay Endoscopy History and Physical    PCP - Renata Gomez MD    Procedure - Colonoscopy  Sedation: GA  ASA - per anesthesia  Mallampati - per anesthesia  History of Anesthesia problems - no  Family history Anesthesia problems -  no     HPI:  This is a 68 y.o. female here for evaluation of : Screening for CRC    Reflux - no  Dysphagia - no  Abdominal pain - no  Diarrhea - no    ROS:  Constitutional: No fevers, chills, No weight loss  ENT: No allergies  CV: No chest pain  Pulm: No cough, No shortness of breath  Ophtho: No vision changes  GI: see HPI  Medical History:  has a past medical history of Avascular necrosis, Cataract (09/09/2022), Diabetes mellitus, Encounter for blood transfusion, Glaucoma, Graves disease, Hypertension, Other abnormal glucose, and Sciatica.    Surgical History:  has a past surgical history that includes Tubal ligation; Hip Arthroplasty (Right, 11/26/2018); Phacoemulsification of cataract (Left, 09/08/2022); Intraocular prosthesis insertion (Left, 09/08/2022); Hip replacement arthroplasty (Right); Phacoemulsification of cataract (Right, 1/26/2023); and Intraocular prosthesis insertion (Right, 1/26/2023).    Family History: family history includes Blindness in her mother; Breast cancer (age of onset: 33) in her sister; Diabetes in her mother and sister; Glaucoma in her brother and mother; Heart attack (age of onset: 40) in her brother; Hypertension in her mother; Kidney failure in her mother; Stomach cancer in her father.. Otherwise no colon cancer, inflammatory bowel disease, or GI malignancies.    Social History:  reports that she has been smoking cigarettes. She has a 10.00 pack-year smoking history. She has never used smokeless tobacco. She reports that she does not drink alcohol and does not use drugs.    Review of patient's allergies indicates:  No Known Allergies    Medications:   Medications Prior to Admission   Medication Sig Dispense Refill Last Dose    atorvastatin  (LIPITOR) 10 MG tablet TAKE 1 TABLET BY MOUTH EVERY DAY 90 tablet 3 3/26/2023    levothyroxine (SYNTHROID) 100 MCG tablet Take 1 tablet (100 mcg total) by mouth before breakfast. 90 tablet 3 3/27/2023    losartan-hydrochlorothiazide 50-12.5 mg (HYZAAR) 50-12.5 mg per tablet Take 1 tablet by mouth once daily. 90 tablet 3 3/26/2023    metFORMIN (GLUCOPHAGE) 1000 MG tablet Take 0.5 tablets (500 mg total) by mouth daily with breakfast. 90 tablet 1 3/26/2023    latanoprost 0.005 % ophthalmic solution INSTILL 1 DROP INTO BOTH EYES IN THE EVENING 2.5 mL 2        Objective Findings:    Vital Signs: Per nursing notes.    Physical Exam:  General Appearance: Well appearing in no acute distress  Head:   Normocephalic, without obvious abnormality  Eyes:    No scleral icterus  Airway: Open  Neck: No restriction in mobility  Lungs: CTA bilaterally in anterior and posterior fields, no wheezes, no crackles.  Heart:  Regular rate and rhythm, S1, S2 normal, no murmurs heard  Abdomen: Soft, non tender, non distended      Labs:  Lab Results   Component Value Date    WBC 9.50 10/20/2022    HGB 12.8 10/20/2022    HCT 43.0 10/20/2022     10/20/2022    CHOL 222 (H) 12/05/2022    TRIG 117 12/05/2022    HDL 54 12/05/2022    ALT 26 10/20/2022    AST 18 10/20/2022     10/20/2022    K 4.1 10/20/2022     10/20/2022    CREATININE 0.8 10/20/2022    BUN 14 10/20/2022    CO2 26 10/20/2022    TSH 0.167 (L) 12/05/2022    HGBA1C 6.7 (H) 10/20/2022         I have explained the risks and benefits of endoscopy procedures to the patient including but not limited to bleeding, perforation, infection, and death.    Thank you so much for allowing me to participate in the care of Carolee Francois MD

## 2023-03-27 NOTE — ANESTHESIA POSTPROCEDURE EVALUATION
Anesthesia Post Evaluation    Patient: Carolee Bartlett    Procedure(s) Performed: Procedure(s) (LRB):  COLONOSCOPY (N/A)    Final Anesthesia Type: general      Patient location during evaluation: GI PACU  Patient participation: Yes- Able to Participate  Level of consciousness: awake and alert  Post-procedure vital signs: reviewed and stable  Pain management: adequate  Airway patency: patent    PONV status at discharge: No PONV  Anesthetic complications: no      Cardiovascular status: blood pressure returned to baseline  Respiratory status: unassisted  Hydration status: euvolemic  Follow-up not needed.          Vitals Value Taken Time   /64 03/27/23 1209   Temp 36.3 °C (97.3 °F) 03/27/23 1140   Pulse 67 03/27/23 1209   Resp 17 03/27/23 1209   SpO2 100 % 03/27/23 1209         Event Time   Out of Recovery 11:56:00         Pain/Everett Score: Everett Score: 10 (3/27/2023 12:15 PM)

## 2023-03-31 ENCOUNTER — PATIENT MESSAGE (OUTPATIENT)
Dept: ENDOSCOPY | Facility: HOSPITAL | Age: 68
End: 2023-03-31
Payer: MEDICARE

## 2023-03-31 LAB
FINAL PATHOLOGIC DIAGNOSIS: NORMAL
GROSS: NORMAL
Lab: NORMAL

## 2023-05-19 DIAGNOSIS — H40.1111 PRIMARY OPEN ANGLE GLAUCOMA (POAG) OF RIGHT EYE, MILD STAGE: ICD-10-CM

## 2023-05-19 DIAGNOSIS — H40.1122 PRIMARY OPEN ANGLE GLAUCOMA (POAG) OF LEFT EYE, MODERATE STAGE: ICD-10-CM

## 2023-05-22 RX ORDER — LATANOPROST 50 UG/ML
SOLUTION/ DROPS OPHTHALMIC
Qty: 2.5 ML | Refills: 2 | Status: SHIPPED | OUTPATIENT
Start: 2023-05-22 | End: 2023-09-05

## 2023-06-06 NOTE — PROGRESS NOTES
Subjective:       Patient ID: Carolee Bartlett is a 68 y.o. female.    Chief Complaint: Glaucoma (HVF and OCT review today) and Dry Eye (Pt states eyes still get dry and irritated with burning sensation and itching)     HPI     Glaucoma            Comments: HVF and OCT review today          Dry Eye            Comments: Pt states eyes still get dry and irritated with burning   sensation and itching          Comments    DLS: 3/22/23    Pt reports radioiodine thyroid ablation for graves. Does not notice any   eye bulging. Feels like maybe she had some trouble with the visual field.   Uses artificial tears all the times. Sometimes has trouble with dry mouth.   Getting teeth pulled and dental implants for loose teeth.     1. Severe MMG OU  2. KCS/PRASHANTH  3. Ptosis OU  4. PCIOLOU  5. Graves Dz  6. Hemifacial Spasms    MEDS:  Latanoprost QHS OU  Xiidra BID OU  Systane AT's PRN OU          Last edited by Soniya Macias MD on 6/7/2023  4:17 PM.              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         MMG severe OU   Was CACG now post CE  -Fhx (+Mother, +Brother), Steroids (),Trauma()  -Drops: Latanoprost qHS OU (started 2/21/22)  -Drop intolerance/contraindication: -  -Laser:-  -Surgeries: CE IOL OU  -CCT: 605 // 640 thick  -Gonio: SS with pigment  Tm 21//23 6/23 HVF 24-2 SAD/IAD [place] VFI 43% OD // SALT/ IAD VFI 28% OS with high FN OU -->++Prog OU  6/23 RNFL Dec TI OD // Dec G/TS/TI B T OS stable OU     HVF and OCT do not match   Repeat HVF in 12/23       PRASHANTH OU  Hx Lupus  Started Xiidra 2/21/22  Pres free AT's about 6 x day   Gel drops or ointment at night   Consider punctal plugs / doxy PRN  Order SSA  / SSB  May need to change to restasis BID     Pseudophakia OU  Lenses WC  OD DIBOO  +21.00 target -0.13   OS DIBOO +21.50 target -0.05       Graves disease  No overt E/o ELMER on exam   Monitor     Eyelid ptosis  Hemifacial spasm / synkinesis  Dr. Burden    PLAN  Cont Latanoprost,  AT, and Xiidra  Order SSA / SSB      RTC 3 month IOP check and review labs         Soniya Macias M.D., M.S.  Department of Ophthalmology   Division of Glaucoma Surgery  [facility]

## 2023-06-07 ENCOUNTER — LAB VISIT (OUTPATIENT)
Dept: LAB | Facility: HOSPITAL | Age: 68
End: 2023-06-07
Attending: INTERNAL MEDICINE
Payer: MEDICARE

## 2023-06-07 ENCOUNTER — CLINICAL SUPPORT (OUTPATIENT)
Dept: OPHTHALMOLOGY | Facility: CLINIC | Age: 68
End: 2023-06-07
Payer: MEDICARE

## 2023-06-07 ENCOUNTER — OFFICE VISIT (OUTPATIENT)
Dept: OPHTHALMOLOGY | Facility: CLINIC | Age: 68
End: 2023-06-07
Payer: MEDICARE

## 2023-06-07 DIAGNOSIS — H02.403 PTOSIS, BILATERAL: ICD-10-CM

## 2023-06-07 DIAGNOSIS — M35.01 KERATOCONJUNCTIVITIS SICCA OF BOTH EYES: ICD-10-CM

## 2023-06-07 DIAGNOSIS — Z96.1 PSEUDOPHAKIA, BOTH EYES: Primary | ICD-10-CM

## 2023-06-07 DIAGNOSIS — G51.31 HEMIFACIAL SPASM OF RIGHT SIDE OF FACE: ICD-10-CM

## 2023-06-07 DIAGNOSIS — H40.53X3 SEVERE STAGE SECONDARY GLAUCOMA OF BOTH EYES DUE TO COMBINATION MECHANISMS: ICD-10-CM

## 2023-06-07 DIAGNOSIS — R73.02 IMPAIRED GLUCOSE TOLERANCE: ICD-10-CM

## 2023-06-07 LAB
ESTIMATED AVG GLUCOSE: 151 MG/DL (ref 68–131)
HBA1C MFR BLD: 6.9 % (ref 4–5.6)

## 2023-06-07 PROCEDURE — 1159F MED LIST DOCD IN RCRD: CPT | Mod: CPTII,S$GLB,, | Performed by: STUDENT IN AN ORGANIZED HEALTH CARE EDUCATION/TRAINING PROGRAM

## 2023-06-07 PROCEDURE — 1101F PT FALLS ASSESS-DOCD LE1/YR: CPT | Mod: CPTII,S$GLB,, | Performed by: STUDENT IN AN ORGANIZED HEALTH CARE EDUCATION/TRAINING PROGRAM

## 2023-06-07 PROCEDURE — 36415 COLL VENOUS BLD VENIPUNCTURE: CPT | Performed by: STUDENT IN AN ORGANIZED HEALTH CARE EDUCATION/TRAINING PROGRAM

## 2023-06-07 PROCEDURE — 1159F PR MEDICATION LIST DOCUMENTED IN MEDICAL RECORD: ICD-10-PCS | Mod: CPTII,S$GLB,, | Performed by: STUDENT IN AN ORGANIZED HEALTH CARE EDUCATION/TRAINING PROGRAM

## 2023-06-07 PROCEDURE — 1160F RVW MEDS BY RX/DR IN RCRD: CPT | Mod: CPTII,S$GLB,, | Performed by: STUDENT IN AN ORGANIZED HEALTH CARE EDUCATION/TRAINING PROGRAM

## 2023-06-07 PROCEDURE — 1101F PR PT FALLS ASSESS DOC 0-1 FALLS W/OUT INJ PAST YR: ICD-10-PCS | Mod: CPTII,S$GLB,, | Performed by: STUDENT IN AN ORGANIZED HEALTH CARE EDUCATION/TRAINING PROGRAM

## 2023-06-07 PROCEDURE — 99214 OFFICE O/P EST MOD 30 MIN: CPT | Mod: S$GLB,,, | Performed by: STUDENT IN AN ORGANIZED HEALTH CARE EDUCATION/TRAINING PROGRAM

## 2023-06-07 PROCEDURE — 1160F PR REVIEW ALL MEDS BY PRESCRIBER/CLIN PHARMACIST DOCUMENTED: ICD-10-PCS | Mod: CPTII,S$GLB,, | Performed by: STUDENT IN AN ORGANIZED HEALTH CARE EDUCATION/TRAINING PROGRAM

## 2023-06-07 PROCEDURE — 86235 NUCLEAR ANTIGEN ANTIBODY: CPT | Performed by: STUDENT IN AN ORGANIZED HEALTH CARE EDUCATION/TRAINING PROGRAM

## 2023-06-07 PROCEDURE — 99214 PR OFFICE/OUTPT VISIT, EST, LEVL IV, 30-39 MIN: ICD-10-PCS | Mod: S$GLB,,, | Performed by: STUDENT IN AN ORGANIZED HEALTH CARE EDUCATION/TRAINING PROGRAM

## 2023-06-07 PROCEDURE — 1126F PR PAIN SEVERITY QUANTIFIED, NO PAIN PRESENT: ICD-10-PCS | Mod: CPTII,S$GLB,, | Performed by: STUDENT IN AN ORGANIZED HEALTH CARE EDUCATION/TRAINING PROGRAM

## 2023-06-07 PROCEDURE — 3288F FALL RISK ASSESSMENT DOCD: CPT | Mod: CPTII,S$GLB,, | Performed by: STUDENT IN AN ORGANIZED HEALTH CARE EDUCATION/TRAINING PROGRAM

## 2023-06-07 PROCEDURE — 83036 HEMOGLOBIN GLYCOSYLATED A1C: CPT | Performed by: INTERNAL MEDICINE

## 2023-06-07 PROCEDURE — 1126F AMNT PAIN NOTED NONE PRSNT: CPT | Mod: CPTII,S$GLB,, | Performed by: STUDENT IN AN ORGANIZED HEALTH CARE EDUCATION/TRAINING PROGRAM

## 2023-06-07 PROCEDURE — 99999 PR PBB SHADOW E&M-EST. PATIENT-LVL II: ICD-10-PCS | Mod: PBBFAC,,, | Performed by: STUDENT IN AN ORGANIZED HEALTH CARE EDUCATION/TRAINING PROGRAM

## 2023-06-07 PROCEDURE — 99999 PR PBB SHADOW E&M-EST. PATIENT-LVL II: CPT | Mod: PBBFAC,,, | Performed by: STUDENT IN AN ORGANIZED HEALTH CARE EDUCATION/TRAINING PROGRAM

## 2023-06-07 PROCEDURE — 3288F PR FALLS RISK ASSESSMENT DOCUMENTED: ICD-10-PCS | Mod: CPTII,S$GLB,, | Performed by: STUDENT IN AN ORGANIZED HEALTH CARE EDUCATION/TRAINING PROGRAM

## 2023-06-07 PROCEDURE — 86235 NUCLEAR ANTIGEN ANTIBODY: CPT | Mod: 59 | Performed by: STUDENT IN AN ORGANIZED HEALTH CARE EDUCATION/TRAINING PROGRAM

## 2023-06-07 RX ORDER — PENICILLIN V POTASSIUM 500 MG/1
500 TABLET, FILM COATED ORAL 3 TIMES DAILY
COMMUNITY
Start: 2023-05-17 | End: 2023-06-28

## 2023-06-07 RX ORDER — LIFITEGRAST 50 MG/ML
1 SOLUTION/ DROPS OPHTHALMIC 2 TIMES DAILY
COMMUNITY
End: 2023-11-16

## 2023-06-07 RX ORDER — ACETAMINOPHEN AND CODEINE PHOSPHATE 300; 30 MG/1; MG/1
1 TABLET ORAL
COMMUNITY
Start: 2023-05-17 | End: 2023-07-07

## 2023-06-07 RX ORDER — IBUPROFEN 800 MG/1
800 TABLET ORAL EVERY 6 HOURS PRN
COMMUNITY
Start: 2023-05-17 | End: 2023-07-07

## 2023-06-07 NOTE — PROGRESS NOTES
Visual field test done.  Patient stated no latex allergies used coverlet      Glasses Prescription     Sphere Cylinder Axis Dist VA Add   Right +0.75 +0.75 094 20/40+2 +2.50   Left -0.50 +0.75 057 20/25-2 +2.50

## 2023-06-08 LAB
ANTI-SSA ANTIBODY: 0.06 RATIO (ref 0–0.99)
ANTI-SSA INTERPRETATION: NEGATIVE
ANTI-SSB ANTIBODY: 0.06 RATIO (ref 0–0.99)
ANTI-SSB INTERPRETATION: NEGATIVE

## 2023-07-01 DIAGNOSIS — E03.9 HYPOTHYROIDISM, UNSPECIFIED TYPE: ICD-10-CM

## 2023-07-02 RX ORDER — LEVOTHYROXINE SODIUM 100 UG/1
TABLET ORAL
Qty: 90 TABLET | Refills: 3 | Status: SHIPPED | OUTPATIENT
Start: 2023-07-02

## 2023-08-07 ENCOUNTER — TELEPHONE (OUTPATIENT)
Dept: PRIMARY CARE CLINIC | Facility: CLINIC | Age: 68
End: 2023-08-07
Payer: MEDICARE

## 2023-08-07 DIAGNOSIS — M25.551 PAIN OF RIGHT HIP: Primary | ICD-10-CM

## 2023-08-07 NOTE — TELEPHONE ENCOUNTER
----- Message from Gisela Matta sent at 8/7/2023  2:17 PM CDT -----  Contact: [phone]  1MEDICALADVICE     Patient is calling for Medical Advice regarding: right hip hurting     How long has patient had these symptoms: 1 week     Pharmacy name and phone#:  [id number] - [place] - [address]  [address] [place] [address]  Phone: [phone] Fax: [phone]       Would like response via [email or web address]:  no    Comments:pt states she had a hip replacement in 2017 and is asking for the drs opinion as to what she should do for the hip pain

## 2023-08-18 ENCOUNTER — HOSPITAL ENCOUNTER (OUTPATIENT)
Dept: RADIOLOGY | Facility: HOSPITAL | Age: 68
Discharge: HOME | End: 2023-08-18
Attending: NURSE PRACTITIONER
Payer: MEDICARE

## 2023-08-18 ENCOUNTER — OFFICE VISIT (OUTPATIENT)
Dept: ORTHOPEDICS | Facility: CLINIC | Age: 68
End: 2023-08-18
Payer: MEDICARE

## 2023-08-18 VITALS — HEIGHT: 62 IN | BODY MASS INDEX: 41.06 KG/M2 | WEIGHT: 223.13 LBS

## 2023-08-18 DIAGNOSIS — Z96.641 STATUS POST TOTAL REPLACEMENT OF RIGHT HIP: ICD-10-CM

## 2023-08-18 DIAGNOSIS — G57.01 PIRIFORMIS SYNDROME, RIGHT: Primary | ICD-10-CM

## 2023-08-18 DIAGNOSIS — M25.551 PAIN OF RIGHT HIP: Primary | ICD-10-CM

## 2023-08-18 DIAGNOSIS — M25.551 PAIN OF RIGHT HIP: ICD-10-CM

## 2023-08-18 PROCEDURE — 73502 X-RAY EXAM HIP UNI 2-3 VIEWS: CPT | Mod: TC,RT

## 2023-08-18 PROCEDURE — 1125F AMNT PAIN NOTED PAIN PRSNT: CPT | Mod: CPTII,S$GLB,, | Performed by: NURSE PRACTITIONER

## 2023-08-18 PROCEDURE — 99204 OFFICE O/P NEW MOD 45 MIN: CPT | Mod: S$GLB,,, | Performed by: NURSE PRACTITIONER

## 2023-08-18 PROCEDURE — 3008F PR BODY MASS INDEX (BMI) DOCUMENTED: ICD-10-PCS | Mod: CPTII,S$GLB,, | Performed by: NURSE PRACTITIONER

## 2023-08-18 PROCEDURE — 73502 X-RAY EXAM HIP UNI 2-3 VIEWS: CPT | Mod: 26,RT,, | Performed by: RADIOLOGY

## 2023-08-18 PROCEDURE — 1160F RVW MEDS BY RX/DR IN RCRD: CPT | Mod: CPTII,S$GLB,, | Performed by: NURSE PRACTITIONER

## 2023-08-18 PROCEDURE — 1160F PR REVIEW ALL MEDS BY PRESCRIBER/CLIN PHARMACIST DOCUMENTED: ICD-10-PCS | Mod: CPTII,S$GLB,, | Performed by: NURSE PRACTITIONER

## 2023-08-18 PROCEDURE — 3288F FALL RISK ASSESSMENT DOCD: CPT | Mod: CPTII,S$GLB,, | Performed by: NURSE PRACTITIONER

## 2023-08-18 PROCEDURE — 1159F PR MEDICATION LIST DOCUMENTED IN MEDICAL RECORD: ICD-10-PCS | Mod: CPTII,S$GLB,, | Performed by: NURSE PRACTITIONER

## 2023-08-18 PROCEDURE — 1101F PT FALLS ASSESS-DOCD LE1/YR: CPT | Mod: CPTII,S$GLB,, | Performed by: NURSE PRACTITIONER

## 2023-08-18 PROCEDURE — 99204 PR OFFICE/OUTPT VISIT, NEW, LEVL IV, 45-59 MIN: ICD-10-PCS | Mod: S$GLB,,, | Performed by: NURSE PRACTITIONER

## 2023-08-18 PROCEDURE — 3288F PR FALLS RISK ASSESSMENT DOCUMENTED: ICD-10-PCS | Mod: CPTII,S$GLB,, | Performed by: NURSE PRACTITIONER

## 2023-08-18 PROCEDURE — 3044F PR MOST RECENT HEMOGLOBIN A1C LEVEL <7.0%: ICD-10-PCS | Mod: CPTII,S$GLB,, | Performed by: NURSE PRACTITIONER

## 2023-08-18 PROCEDURE — 1159F MED LIST DOCD IN RCRD: CPT | Mod: CPTII,S$GLB,, | Performed by: NURSE PRACTITIONER

## 2023-08-18 PROCEDURE — 1125F PR PAIN SEVERITY QUANTIFIED, PAIN PRESENT: ICD-10-PCS | Mod: CPTII,S$GLB,, | Performed by: NURSE PRACTITIONER

## 2023-08-18 PROCEDURE — 1101F PR PT FALLS ASSESS DOC 0-1 FALLS W/OUT INJ PAST YR: ICD-10-PCS | Mod: CPTII,S$GLB,, | Performed by: NURSE PRACTITIONER

## 2023-08-18 PROCEDURE — 99999 PR PBB SHADOW E&M-EST. PATIENT-LVL III: ICD-10-PCS | Mod: PBBFAC,,, | Performed by: NURSE PRACTITIONER

## 2023-08-18 PROCEDURE — 3008F BODY MASS INDEX DOCD: CPT | Mod: CPTII,S$GLB,, | Performed by: NURSE PRACTITIONER

## 2023-08-18 PROCEDURE — 73502 XR HIP WITH PELVIS WHEN PERFORMED, 2 OR 3  VIEWS RIGHT: ICD-10-PCS | Mod: 26,RT,, | Performed by: RADIOLOGY

## 2023-08-18 PROCEDURE — 3044F HG A1C LEVEL LT 7.0%: CPT | Mod: CPTII,S$GLB,, | Performed by: NURSE PRACTITIONER

## 2023-08-18 PROCEDURE — 99999 PR PBB SHADOW E&M-EST. PATIENT-LVL III: CPT | Mod: PBBFAC,,, | Performed by: NURSE PRACTITIONER

## 2023-08-18 RX ORDER — IBUPROFEN 600 MG/1
600 TABLET ORAL EVERY 8 HOURS PRN
Qty: 30 TABLET | Refills: 0 | Status: SHIPPED | OUTPATIENT
Start: 2023-08-18

## 2023-08-18 NOTE — PROGRESS NOTES
SUBJECTIVE:     Chief Complaint & History of Present Illness:  Carolee Bartlett is a New 68 y.o. year old female patient presenting today for constant right hip pain which started several months ago.  There is not a history of trauma.  The pain is located in the posterior/lateral aspect of the hip.  The pain is described as achy, 7/10.  It is  worse with going up and down steps, walking and sitting .  There is radiation into the leg.  She had a BILLY by Dr. Redding on 11/26/2018.  She denies any fever chills.  Denies any numbness or tingling sensation to her lower leg.  She has been using Tylenol and ibuprofen over-the-counter with minimal relief.  The patient does not use an assistive device.      Past Medical History:   Diagnosis Date    Avascular necrosis     Diabetes mellitus     Encounter for blood transfusion     Glaucoma     Graves disease     Hypertension     Other abnormal glucose     Pre-Diabetic    Sciatica        Past Surgical History:   Procedure Laterality Date    CATARACT EXTRACTION W/  INTRAOCULAR LENS IMPLANT Right 01/26/2023        CATARACT EXTRACTION W/  INTRAOCULAR LENS IMPLANT Left 09/08/2022        COLONOSCOPY N/A 03/27/2023    Procedure: COLONOSCOPY;  Surgeon: Yohannes Francois MD;  Location: [facility] ENDOSCOPY;  Service: Endoscopy;  Laterality: N/A;  instr via portal; AP  3/24 pre call complete, pt stated she would have a ride -CE    HIP ARTHROPLASTY Right 11/26/2018    Procedure: ARTHROPLASTY, HIP, Raciel, peg board, first assist;  Surgeon: Olayinka Redding MD;  Location: [facility];  Service: Orthopedics;  Laterality: Right;  RACIEL ORTHO CONFIRMED 11/20/DME FIRST ASSISTANT CONFIRMED 11/21/DME    HIP REPLACEMENT ARTHROPLASTY Right     INTRAOCULAR PROSTHESES INSERTION Left 09/08/2022    Procedure: INSERTION, IOL PROSTHESIS;  Surgeon: Soniya Macias MD;  Location: [address];  Service: Ophthalmology;  Laterality: Left;    INTRAOCULAR PROSTHESES INSERTION Right  01/26/2023    Procedure: INSERTION, IOL PROSTHESIS;  Surgeon: Soniya Macias MD;  Location: [facility] OR [address] [place] [address];  Service: Ophthalmology;  Laterality: Right;    PHACOEMULSIFICATION OF CATARACT Left 09/08/2022    Procedure: PHACOEMULSIFICATION, CATARACT;  Surgeon: Soniya Macias MD;  [facility];  Service: Ophthalmology;  Laterality: Left;    PHACOEMULSIFICATION OF CATARACT Right 01/26/2023    Procedure: PHACOEMULSIFICATION, CATARACT;  Surgeon: Soniya Macias MD;  Location: [facility] OR [address] [place] [address];  Service: Ophthalmology;  Laterality: Right;    TUBAL LIGATION         Family History   Problem Relation Age of Onset    Kidney failure Mother     Hypertension Mother         was on list to have kidney, lung, and heart transplant    Glaucoma Mother     Blindness Mother     Diabetes Mother     Stomach cancer Father     Breast cancer Sister 33        double mastectomy    Diabetes Sister     Heart attack Brother 40    Glaucoma Brother     Amblyopia Neg Hx     Cataracts Neg Hx     Macular degeneration Neg Hx     Retinal detachment Neg Hx     Strabismus Neg Hx        Review of patient's allergies indicates:  No Known Allergies      Current Outpatient Medications:     atorvastatin (LIPITOR) 10 MG tablet, TAKE 1 TABLET BY MOUTH EVERY DAY, Disp: 90 tablet, Rfl: 3    latanoprost 0.005 % ophthalmic solution, INSTILL 1 DROP INTO BOTH EYES IN THE EVENING, Disp: 2.5 mL, Rfl: 2    levothyroxine (SYNTHROID) 100 MCG tablet, TAKE 1 TABLET BY MOUTH BEFORE BREAKFAST., Disp: 90 tablet, Rfl: 3    lifitegrast (XIIDRA) 5 % Dpet, Place 1 drop into both eyes 2 (two) times a day., Disp: , Rfl:     losartan-hydrochlorothiazide 50-12.5 mg (HYZAAR) 50-12.5 mg per tablet, TAKE 1 TABLET BY MOUTH EVERY DAY, Disp: 90 tablet, Rfl: 3    metFORMIN (GLUCOPHAGE) 1000 MG tablet, Take 0.5 tablets (500 mg total) by mouth daily with breakfast., Disp: 90 tablet, Rfl: 1    Review of Systems:  ROS:  Constitutional: no fever or chills  Eyes: no visual  "changes  ENT: no nasal congestion or sore throat  Respiratory: no cough or shortness of breath  Cardiovascular: no chest pain or palpitations  Gastrointestinal: no nausea or vomiting, tolerating diet  Genitourinary: no hematuria or dysuria  Integument/Breast: no rash or pruritis  Hematologic/Lymphatic: no easy bruising or lymphadenopathy  Musculoskeletal:  Right hip pain  Neurological: no seizures or tremors  Behavioral/Psych: no auditory or visual hallucinations  Endocrine: no heat or cold intolerance      PE:  Ht 5' 2" (1.575 m)   Wt 101.2 kg (223 lb 1.7 oz)   BMI 40.81 kg/m²   Estimated body mass index is 40.81 kg/m² as calculated from the following:    Height as of this encounter: 5' 2" (1.575 m).    Weight as of this encounter: 101.2 kg (223 lb 1.7 oz).   General: Pleasant, cooperative, NAD   HEENT: NCAT, sclera nonicteric   Lungs: Respirations are equal and unlabored.   Abdomen: Soft and non-tender.  CV: 2+ bilateral upper and lower extremity pulses.   Skin: Intact throughout LE with no rashes, erythema, or lesions  Extremities: No LE edema, NVI lower extremities      Hip Exam:   rightpositives: pain with flexion and tenderness over greater trochanter and negatives: no pain with heel impact    90 degrees flexion  0 degrees extension   30 degrees internal rotation  25 degrees external rotation  20 degrees abduction  20 degrees adduction     There is tenderness to palpation to the posterior aspect of the hip along the buttocks and over the greater trochanteric area.      RADIOGRAPHS:  X-ray of the right hip was obtained, findings show a total hip arthroplasty that appears to be in good position and alignment.  No fracture or hardware failure seen.  X-ray appears unchanged when compared to prior x-ray dated March 11, 2019.  All radiographs were personally reviewed by me.    ASSESSMENT/PLAN:       ICD-10-CM ICD-9-CM   1. Piriformis syndrome, right  G57.01 355.0   2. Pain of right hip  M25.551 719.45   3. Status " post total replacement of right hip  Z96.641 V43.64       -Carolee Bartlett presents to clinic today with c/c right hip pain for the past several months.  No reports of recent trauma.  -X-ray as above.    I suspect she has piriformis syndrome on the right and possible greater trochanteric bursitis.  Unfortunately can not give the patient any steroids as she does have glaucoma.  She has not done any recent physical therapy.  I will give her a prescription for Motrin 600 mg 3 times a day p.r.n. she will continue using over-the-counter Tylenol p.r.n..  I will refer her to physical therapy with [facility] to work on muscle strengthening and stretching.  I advised the patient should this approach fail to make her pain any better I recommend that she follow-up with 1 of the joint replacement surgeons for further evaluation.

## 2023-08-25 ENCOUNTER — CLINICAL SUPPORT (OUTPATIENT)
Dept: REHABILITATION | Facility: HOSPITAL | Age: 68
End: 2023-08-25
Payer: MEDICARE

## 2023-08-25 DIAGNOSIS — M25.651 DECREASED RANGE OF RIGHT HIP MOVEMENT: ICD-10-CM

## 2023-08-25 DIAGNOSIS — G57.01 PIRIFORMIS SYNDROME, RIGHT: ICD-10-CM

## 2023-08-25 PROCEDURE — 97110 THERAPEUTIC EXERCISES: CPT

## 2023-08-25 PROCEDURE — 97161 PT EVAL LOW COMPLEX 20 MIN: CPT

## 2023-08-27 PROBLEM — M25.651 DECREASED RANGE OF RIGHT HIP MOVEMENT: Status: ACTIVE | Noted: 2023-08-27

## 2023-08-28 NOTE — PLAN OF CARE
[facility]   Physical Therapy Initial Evaluation      Name: Carolee Bartlett  Clinic Number: [id number]    Therapy Diagnosis:   Encounter Diagnoses   Name Primary?    Piriformis syndrome, right     Decreased range of right hip movement         Physician: Alexandru Albert NP    Physician Orders: PT Eval and Treat   Medical Diagnosis from Referral: Piriformis syndrome, right [G57.01]  Evaluation Date: 8/25/2023  Authorization Period Expiration: 10/20/2023  Plan of Care Expiration: 10/25/2023  Progress Note Due: 9/25/2023  Visit # / Visits authorized: 1/ 1   FOTO: 1/ 3    Precautions: Standard     Time In: 9:15 am  Time Out: 10:00 am  Total Billable Time: 45 minutes    Subjective     Date of onset: 1 month ago    History of current condition - Carolee reports a hip replacement in 2017 and has been doing well since. Patient reports a month ago she took a awkward step with her right leg and her hip has been bothering her more since. Patient reports going up stairs bothers her the most, especially when leading with right. Patient denies any numbness and tingling of right lower extremity.    Falls: none    Imaging: x-ray 8/18/2023 right hip: No acute fractures.  Intact lower lumbar spine and right and left SI joints.  Intact left hip joint space.  Preserved left femoral head contour.  Reconfirmed findings of right total hip procedure, stable and satisfactory alignment and position of femoral and acetabular hardware with no periprosthetic fracture.    Prior Therapy: yes  Social History:  lives with their family  Occupation: retired  Prior Level of Function: independent  Current Level of Function: independent    Pain:  Current 6/10, worst 7/10, best 0/10   Location: right hip    Description: Aching, Dull, and Sharp  Aggravating Factors: Standing, Walking, and going up stairs  Easing Factors: pain medication and rest    Patients goals: Patient wishes to reduce hip pain to get back to enjoying  life with her grandkids.     Medical History:   Past Medical History:   Diagnosis Date    Avascular necrosis     Diabetes mellitus     Encounter for blood transfusion     Glaucoma     Graves disease     Hypertension     Other abnormal glucose     Pre-Diabetic    Sciatica      Surgical History:   Carolee Bartlett  has a past surgical history that includes Tubal ligation; Hip Arthroplasty (Right, 11/26/2018); Phacoemulsification of cataract (Left, 09/08/2022); Intraocular prosthesis insertion (Left, 09/08/2022); Hip replacement arthroplasty (Right); Phacoemulsification of cataract (Right, 01/26/2023); Intraocular prosthesis insertion (Right, 01/26/2023); Colonoscopy (N/A, 03/27/2023); Cataract extraction w/  intraocular lens implant (Right, 01/26/2023); and Cataract extraction w/  intraocular lens implant (Left, 09/08/2022).    Medications:   Carolee has a current medication list which includes the following prescription(s): atorvastatin, ibuprofen, latanoprost, levothyroxine, xiidra, losartan-hydrochlorothiazide 50-12.5 mg, and metformin.    Allergies:   Review of patient's allergies indicates:  No Known Allergies     Objective      Observation: ambulates without AD, decreased stance time of right,     Hip Range of Motion:   Right active Right Passive Left active  Left Passive Normal   Flexion 100 110 (pain) 120 125 120   Abduction NP 40 45 NP 45   Ext. Rotation 20 45 30 55 40-60   Int. Rotation 15 20 (pain) 20 23 30-45     Lower Extremity Strength  Right LE  Left LE    Quadriceps: 4-/5 (pain) Quadriceps: 4+/5   Hamstrings: 4-/5 (pain) Hamstrings: 4/5   Iliopsoas: 4-/5 (pain) Iliopsoas: 4/5   Hip extension:  3+/5 Hip extension: 4/5   Hip ER: 3/5 Hip ER: 4-/5   Hip IR: 4/5 (pain) Hip IR: 4/5     Functional Tests:  30 sec STS: 7 reps with upper extremity push off  Tandem Balance 30 seconds without upper extremity support both feet fwd    Flexibility:    Hamstrings: R = - ; L = -     Palpation: tender to palpation of  piriformis of right    Intake Outcome Measure for FOTO Hip Survey    Therapist reviewed FOTO scores for Carolee Bartlett on 8/25/2023.   FOTO report - see Media section or FOTO account episode details.    Intake Score: TBD%         Treatment     Total Treatment time (time-based codes) separate from Evaluation: 15 minutes     Carolee received the treatments listed below:      therapeutic exercises to develop strength, endurance, ROM, flexibility, and posture for 15 minutes including:    Standing hip abduciton (R)  Standing hip extension (R)  Supine Knee to chest stretch  Mini squats  Left Sidelying hook clam    Patient Education and Home Exercises     Education provided:   - Role of Physical Therapy, plan of care, home exercise program, prognosis, physical therapy diagnosis  - hip anatomy with deficits present and contribution to symptoms     Written Home Exercises Provided: yes. Exercises were reviewed and Carolee was able to demonstrate them prior to the end of the session.  Carolee demonstrated good  understanding of the education provided. See EMR under Patient Instructions for exercises provided during therapy sessions.    Assessment     Carolee is a 68 y.o. female referred to outpatient Physical Therapy with a medical diagnosis of Piriformis syndrome, right [G57.01]. Patient presents with deficits in right hip range of motion and strength, especially external rotation of right. Patient deficits contributing to gait deviations and limiting functional mobility. Patient deficits impairing quality of life and limiting time spent with grandchildren.    Patient prognosis is Good.   Patient will benefit from skilled outpatient Physical Therapy to address the deficits stated above and in the chart below, provide patient /family education, and to maximize patientt's level of independence.     Plan of care discussed with patient: Yes  Patient's spiritual, cultural and educational needs considered and patient is agreeable to  the plan of care and goals as stated below:     Anticipated Barriers for therapy: none    Medical Necessity is demonstrated by the following  History  Co-morbidities and personal factors that may impact the plan of care [x] LOW: no personal factors / co-morbidities  [] MODERATE: 1-2 personal factors / co-morbidities  [] HIGH: 3+ personal factors / co-morbidities    Moderate / High Support Documentation:   Co-morbidities affecting plan of care:     Personal Factors:   age     Examination  Body Structures and Functions, activity limitations and participation restrictions that may impact the plan of care [x] LOW: addressing 1-2 elements  [] MODERATE: 3+ elements  [] HIGH: 4+ elements (please support below)    Moderate / High Support Documentation:      Clinical Presentation [x] LOW: stable  [] MODERATE: Evolving  [] HIGH: Unstable     Decision Making/ Complexity Score: low       GOALS: Short Term Goals:  4 weeks  1.Report decreased right hip pain  < / =  4/10  to increase tolerance for walking and standing  2. Increase ROM by 10 degrees where limited in order to perform ADLs without difficulty.  3. Increase strength by 1/3 MMT grade in hip external rotators  to increase tolerance for ADL and work activities.  4. Pt to tolerate HEP to improve ROM and independence with ADL's    Long Term Goals: 8 weeks  1.Report decreased right hip pain < / = 1/10  to increase tolerance for walking and standing  3.Increase strength to 4+/5 in  hip external rotators  to increase tolerance for ADL and work activities.  4. Pt will report at FOTO level (20-40% impaired) to demonstrate increase in LE function with every day tasks.    Plan     Plan of care Certification: 8/25/2023 to 10/25/2023.    Outpatient Physical Therapy 1-2 times weekly for 8 weeks to include the following interventions: Gait Training, Manual Therapy, Moist Heat/ Ice, Neuromuscular Re-ed, Patient Education, Therapeutic Activities, and Therapeutic Exercise.     Nathan  Libby, PT        Physician's Signature: _________________________________________ Date: ________________

## 2023-09-01 ENCOUNTER — CLINICAL SUPPORT (OUTPATIENT)
Dept: REHABILITATION | Facility: HOSPITAL | Age: 68
End: 2023-09-01
Payer: MEDICARE

## 2023-09-01 ENCOUNTER — OFFICE VISIT (OUTPATIENT)
Dept: OPHTHALMOLOGY | Facility: CLINIC | Age: 68
End: 2023-09-01
Payer: MEDICARE

## 2023-09-01 DIAGNOSIS — Z96.1 PSEUDOPHAKIA, BOTH EYES: ICD-10-CM

## 2023-09-01 DIAGNOSIS — H04.123 DRY EYE SYNDROME OF BOTH LACRIMAL GLANDS: ICD-10-CM

## 2023-09-01 DIAGNOSIS — H40.53X3 SEVERE STAGE SECONDARY GLAUCOMA OF BOTH EYES DUE TO COMBINATION MECHANISMS: Primary | ICD-10-CM

## 2023-09-01 DIAGNOSIS — M35.01 KERATOCONJUNCTIVITIS SICCA OF BOTH EYES: ICD-10-CM

## 2023-09-01 DIAGNOSIS — M25.651 DECREASED RANGE OF RIGHT HIP MOVEMENT: Primary | ICD-10-CM

## 2023-09-01 DIAGNOSIS — G51.31 HEMIFACIAL SPASM OF RIGHT SIDE OF FACE: ICD-10-CM

## 2023-09-01 DIAGNOSIS — H02.403 PTOSIS, BILATERAL: ICD-10-CM

## 2023-09-01 PROCEDURE — 3044F HG A1C LEVEL LT 7.0%: CPT | Mod: CPTII,S$GLB,, | Performed by: STUDENT IN AN ORGANIZED HEALTH CARE EDUCATION/TRAINING PROGRAM

## 2023-09-01 PROCEDURE — 97110 THERAPEUTIC EXERCISES: CPT

## 2023-09-01 PROCEDURE — 99999 PR PBB SHADOW E&M-EST. PATIENT-LVL II: CPT | Mod: PBBFAC,,, | Performed by: STUDENT IN AN ORGANIZED HEALTH CARE EDUCATION/TRAINING PROGRAM

## 2023-09-01 PROCEDURE — 3044F PR MOST RECENT HEMOGLOBIN A1C LEVEL <7.0%: ICD-10-PCS | Mod: CPTII,S$GLB,, | Performed by: STUDENT IN AN ORGANIZED HEALTH CARE EDUCATION/TRAINING PROGRAM

## 2023-09-01 PROCEDURE — 1126F AMNT PAIN NOTED NONE PRSNT: CPT | Mod: CPTII,S$GLB,, | Performed by: STUDENT IN AN ORGANIZED HEALTH CARE EDUCATION/TRAINING PROGRAM

## 2023-09-01 PROCEDURE — 99214 OFFICE O/P EST MOD 30 MIN: CPT | Mod: S$GLB,,, | Performed by: STUDENT IN AN ORGANIZED HEALTH CARE EDUCATION/TRAINING PROGRAM

## 2023-09-01 PROCEDURE — 1126F PR PAIN SEVERITY QUANTIFIED, NO PAIN PRESENT: ICD-10-PCS | Mod: CPTII,S$GLB,, | Performed by: STUDENT IN AN ORGANIZED HEALTH CARE EDUCATION/TRAINING PROGRAM

## 2023-09-01 PROCEDURE — 1160F RVW MEDS BY RX/DR IN RCRD: CPT | Mod: CPTII,S$GLB,, | Performed by: STUDENT IN AN ORGANIZED HEALTH CARE EDUCATION/TRAINING PROGRAM

## 2023-09-01 PROCEDURE — 99999 PR PBB SHADOW E&M-EST. PATIENT-LVL II: ICD-10-PCS | Mod: PBBFAC,,, | Performed by: STUDENT IN AN ORGANIZED HEALTH CARE EDUCATION/TRAINING PROGRAM

## 2023-09-01 PROCEDURE — 3288F PR FALLS RISK ASSESSMENT DOCUMENTED: ICD-10-PCS | Mod: CPTII,S$GLB,, | Performed by: STUDENT IN AN ORGANIZED HEALTH CARE EDUCATION/TRAINING PROGRAM

## 2023-09-01 PROCEDURE — 1101F PT FALLS ASSESS-DOCD LE1/YR: CPT | Mod: CPTII,S$GLB,, | Performed by: STUDENT IN AN ORGANIZED HEALTH CARE EDUCATION/TRAINING PROGRAM

## 2023-09-01 PROCEDURE — 1159F PR MEDICATION LIST DOCUMENTED IN MEDICAL RECORD: ICD-10-PCS | Mod: CPTII,S$GLB,, | Performed by: STUDENT IN AN ORGANIZED HEALTH CARE EDUCATION/TRAINING PROGRAM

## 2023-09-01 PROCEDURE — 1101F PR PT FALLS ASSESS DOC 0-1 FALLS W/OUT INJ PAST YR: ICD-10-PCS | Mod: CPTII,S$GLB,, | Performed by: STUDENT IN AN ORGANIZED HEALTH CARE EDUCATION/TRAINING PROGRAM

## 2023-09-01 PROCEDURE — 99214 PR OFFICE/OUTPT VISIT, EST, LEVL IV, 30-39 MIN: ICD-10-PCS | Mod: S$GLB,,, | Performed by: STUDENT IN AN ORGANIZED HEALTH CARE EDUCATION/TRAINING PROGRAM

## 2023-09-01 PROCEDURE — 3288F FALL RISK ASSESSMENT DOCD: CPT | Mod: CPTII,S$GLB,, | Performed by: STUDENT IN AN ORGANIZED HEALTH CARE EDUCATION/TRAINING PROGRAM

## 2023-09-01 PROCEDURE — 1159F MED LIST DOCD IN RCRD: CPT | Mod: CPTII,S$GLB,, | Performed by: STUDENT IN AN ORGANIZED HEALTH CARE EDUCATION/TRAINING PROGRAM

## 2023-09-01 PROCEDURE — 1160F PR REVIEW ALL MEDS BY PRESCRIBER/CLIN PHARMACIST DOCUMENTED: ICD-10-PCS | Mod: CPTII,S$GLB,, | Performed by: STUDENT IN AN ORGANIZED HEALTH CARE EDUCATION/TRAINING PROGRAM

## 2023-09-01 RX ORDER — CYCLOSPORINE 0.5 MG/ML
1 EMULSION OPHTHALMIC 2 TIMES DAILY
Qty: 60 EACH | Refills: 12 | Status: SHIPPED | OUTPATIENT
Start: 2023-09-01 | End: 2024-08-31

## 2023-09-01 NOTE — PROGRESS NOTES
Subjective:       Patient ID: Carolee Bartlett is a 68 y.o. female.    Chief Complaint: Glaucoma (3 month ck and pt states no changes since last exam )     HPI     Glaucoma            Comments: 3 month ck and pt states no changes since last exam           Comments    DLS: 6/7/23    1. Severe MMG OU  2. KCS/PRASHANTH  3. Hx Lupus  4. PCIOLOU  5. Graves Dz  6. Ptosis  7. Hemifacial Spasms / Synkinesis    MEDS:  Latanoprost QHS OU  Xiidra BID OU  Systane AT's PRN OU          Last edited by Arabella Hirsch MA on 9/1/2023  3:19 PM.              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  Dispense: 60 each; Refill: 12         MMG severe OU   Was CACG now post CE  -Fhx (+Mother, +Brother), Steroids (),Trauma()  -Drops: Latanoprost qHS OU (started 2/21/22)  -Drop intolerance/contraindication: -  -Laser:-  -Surgeries: CE IOL OU  -CCT: 605 // 640 thick  -Gonio: SS with pigment  Tm 21//23 6/23 HVF 24-2 SAD/IAD central island VFI 43% OD // SALT/ IAD VFI 28% OS with high FN OU -->++Prog OU  6/23 RNFL Dec TI OD // Dec G/TS/TI B T OS stable OU     HVF and OCT do not match   Repeat HVF in 12/23       PRASHANTH OU  Hx Lupus  Started Xiidra 2/21/22 --> failed --> try restasis (sent 9/1/23)  Pres free AT's about 6 x day   Gel drops or ointment at night   Consider punctal plugs / doxy PRN  Anti-SSA / SSB negative  May refer to Dr. Chan     Pseudophakia OU  Lenses WC  OD DIBOO  +21.00 target -0.13   OS DIBOO +21.50 target -0.05       Graves disease  No overt E/o ELMER on exam    Monitor     Eyelid ptosis  Hemifacial spasm / synkinesis  Dr. Burden    PLAN  Cont Latanoprost,  AT, and Xiidra  Start restasis BID      RTC 3 month F 24-2         Soniya Macias M.D., M.S.  Department of Ophthalmology   Division of Glaucoma Surgery  [facility]

## 2023-09-01 NOTE — PROGRESS NOTES
[facility]   Physical Therapy Treatment Note      Name: Carolee Bartlett  Clinic Number: [id number]    Therapy Diagnosis:   Encounter Diagnosis   Name Primary?    Decreased range of right hip movement Yes     Physician: Alexandru Albert NP    Visit Date: 9/1/2023    Physician Orders: PT Eval and Treat   Medical Diagnosis from Referral: Piriformis syndrome, right [G57.01]  Evaluation Date: 8/25/2023  Authorization Period Expiration: 10/20/2023  Plan of Care Expiration: 10/25/2023  Progress Note Due: 9/25/2023  Visit # / Visits authorized: 1/1   FOTO: 1/3     Precautions: Standard      Time In: 8:00 am  Time Out: 8:40 am  Total Billable Time: 40 minutes    PTA Visit #: 0/5       Subjective     Patient reports: Patient reports her pain is similar to initial evaluation.    She was compliant with home exercise program.  Response to previous treatment: first follow up  Functional change: first follow up    Pain: 7/10  Location: right hip      Objective      Objective Measures updated at progress report unless specified.     Treatment     Carolee received the treatments listed below:      therapeutic exercises to develop strength, endurance, ROM, flexibility, and posture for 38 minutes including:    Seated Hamstring Stretch 5x30s both sides  Seated Piriformis Stretch 5x30s both sides  Standing Hip abduction PGM focused 2 inch platform 2x10  Standing hip extension 2 inch platform 2x10  Bridges 3x10  Left Sidelying hook clam 3x10  Supine Knee to chest stretch 3x30s  Mini squats 2x10  Sit to stands with mirror feedback for equal weight bearing 2x10    manual therapy techniques: Joint mobilizations and Manual traction were applied to the: right hip for 2 minutes, including:    Hip joint assessment    Patient Education and Home Exercises       Education provided:   - home exercise program updates  - introductory walking program    Written Home Exercises Provided: yes. Exercises were reviewed and  Carolee was able to demonstrate them prior to the end of the session.  Carolee demonstrated good  understanding of the education provided. See Electronic Medical Record under Patient Instructions for exercises provided during therapy sessions    Assessment     Patient presented for initial follow up session today with good adherence to home exercise program. Patient tolerated all progressions today. Patient demonstrated increased weight shift to left during sit to stands. Plan to work on closed kinetic chain activities more next session with focus on equal weight bearing during movement.    Carolee Is progressing well towards her goals.   Patient prognosis is Good.     Patient will continue to benefit from skilled outpatient physical therapy to address the deficits listed in the problem list box on initial evaluation, provide pt/family education and to maximize pt's level of independence in the home and community environment.     Patient's spiritual, cultural and educational needs considered and pt agreeable to plan of care and goals.     Anticipated barriers to physical therapy: none    GOALS: Short Term Goals:  4 weeks  1.Report decreased right hip pain  < / =  4/10  to increase tolerance for walking and standing  2. Increase ROM by 10 degrees where limited in order to perform ADLs without difficulty.  3. Increase strength by 1/3 MMT grade in hip external rotators  to increase tolerance for ADL and work activities.  4. Pt to tolerate HEP to improve ROM and independence with ADL's     Long Term Goals: 8 weeks  1.Report decreased right hip pain < / = 1/10  to increase tolerance for walking and standing  3.Increase strength to 4+/5 in  hip external rotators  to increase tolerance for ADL and work activities.  4. Pt will report at FOTO level (20-40% impaired) to demonstrate increase in LE function with every day tasks.    Plan      Plan of care Certification: 8/25/2023 to 10/25/2023.     Outpatient Physical Therapy 1-2  times weekly for 8 weeks to include the following interventions: Gait Training, Manual Therapy, Moist Heat/ Ice, Neuromuscular Re-ed, Patient Education, Therapeutic Activities, and Therapeutic Exercise.     Nathan Mckeon, PT

## 2023-09-04 DIAGNOSIS — H40.1111 PRIMARY OPEN ANGLE GLAUCOMA (POAG) OF RIGHT EYE, MILD STAGE: ICD-10-CM

## 2023-09-04 DIAGNOSIS — H40.1122 PRIMARY OPEN ANGLE GLAUCOMA (POAG) OF LEFT EYE, MODERATE STAGE: ICD-10-CM

## 2023-09-05 RX ORDER — LATANOPROST 50 UG/ML
SOLUTION/ DROPS OPHTHALMIC
Qty: 7.5 ML | Refills: 4 | Status: SHIPPED | OUTPATIENT
Start: 2023-09-05

## 2023-09-12 ENCOUNTER — PATIENT MESSAGE (OUTPATIENT)
Dept: ADMINISTRATIVE | Facility: OTHER | Age: 68
End: 2023-09-12
Payer: MEDICARE

## 2023-09-15 ENCOUNTER — TELEPHONE (OUTPATIENT)
Dept: PRIMARY CARE CLINIC | Facility: CLINIC | Age: 68
End: 2023-09-15
Payer: MEDICARE

## 2023-09-15 ENCOUNTER — HOSPITAL ENCOUNTER (EMERGENCY)
Facility: HOSPITAL | Age: 68
Discharge: HOME | End: 2023-09-15
Attending: EMERGENCY MEDICINE
Payer: MEDICARE

## 2023-09-15 VITALS
DIASTOLIC BLOOD PRESSURE: 69 MMHG | TEMPERATURE: 98 F | HEIGHT: 63 IN | WEIGHT: 210 LBS | RESPIRATION RATE: 18 BRPM | OXYGEN SATURATION: 99 % | BODY MASS INDEX: 37.21 KG/M2 | HEART RATE: 65 BPM | SYSTOLIC BLOOD PRESSURE: 139 MMHG

## 2023-09-15 DIAGNOSIS — R07.9 CHEST PAIN: ICD-10-CM

## 2023-09-15 DIAGNOSIS — R06.02 SHORTNESS OF BREATH: ICD-10-CM

## 2023-09-15 DIAGNOSIS — U07.1 COVID-19: ICD-10-CM

## 2023-09-15 DIAGNOSIS — R07.89 CHEST TIGHTNESS: Primary | ICD-10-CM

## 2023-09-15 DIAGNOSIS — J84.10 CALCIFIED GRANULOMA OF LUNG: ICD-10-CM

## 2023-09-15 PROCEDURE — 93010 ELECTROCARDIOGRAM REPORT: CPT | Mod: ,,, | Performed by: INTERNAL MEDICINE

## 2023-09-15 PROCEDURE — 93010 EKG 12-LEAD: ICD-10-PCS | Mod: ,,, | Performed by: INTERNAL MEDICINE

## 2023-09-15 PROCEDURE — 93005 ELECTROCARDIOGRAM TRACING: CPT

## 2023-09-15 PROCEDURE — 99284 EMERGENCY DEPT VISIT MOD MDM: CPT | Mod: 25

## 2023-09-15 RX ORDER — PENICILLIN V POTASSIUM 500 MG/1
TABLET, FILM COATED ORAL
COMMUNITY
Start: 2023-06-28

## 2023-09-15 RX ORDER — BENZONATATE 100 MG/1
100 CAPSULE ORAL 3 TIMES DAILY PRN
Qty: 20 CAPSULE | Refills: 0 | Status: SHIPPED | OUTPATIENT
Start: 2023-09-15 | End: 2023-09-25

## 2023-09-15 NOTE — TELEPHONE ENCOUNTER
----- Message from Oliva Pisano sent at 9/15/2023 11:00 AM CDT -----  Contact: [phone]  1MEDICALADVICE     Patient is calling for Medical Advice regarding: headache, coughing, sneezing, sore throat, nasal congestion, and difficulty breathing     How long has patient had these symptoms: yesterday and has gotten worse     Pharmacy name and phone#:    Would like response via mychart:    [id number] - [place] - [address]  [address] [place] [address]  Phone: [phone] Fax: [phone]        Comments:    Pt tested positive for C-19 through a home test. She would like to get some medication sent to the above pharmacy.  Aware provider is not in today and would like to have someone call her. Please Advise

## 2023-09-15 NOTE — ED PROVIDER NOTES
Encounter Date: 9/15/2023       History     Chief Complaint   Patient presents with    Chest Pain     Pt tested positive for COVID today. C/o chest pain, SOB, and HA. + Productive cough.      68 y.o. female with PMHx of T2DM, AVN, glaucoma, Graves disease, HTN presents to ED with shortness of breath x2 days. She has associated chest tightness, cough, runny nose, congestion, body aches. She tested positive for COVID this morning on at-home test.  She contacted her PCP recommended she be evaluated in the emergency department. She denies fever.    The history is provided by the patient.     Review of patient's allergies indicates:  No Known Allergies  Past Medical History:   Diagnosis Date    Avascular necrosis     Diabetes mellitus     Encounter for blood transfusion     Glaucoma     Graves disease     Hypertension     Other abnormal glucose     Pre-Diabetic    Sciatica      Past Surgical History:   Procedure Laterality Date    CATARACT EXTRACTION W/  INTRAOCULAR LENS IMPLANT Right 01/26/2023        CATARACT EXTRACTION W/  INTRAOCULAR LENS IMPLANT Left 09/08/2022        COLONOSCOPY N/A 03/27/2023    Procedure: COLONOSCOPY;  Surgeon: Yohannes Francois MD;  Location: [facility] ENDOSCOPY;  Service: Endoscopy;  Laterality: N/A;  instr via portal; AP  3/24 pre call complete, pt stated she would have a ride -CE    HIP ARTHROPLASTY Right 11/26/2018    Procedure: ARTHROPLASTY, HIP, Raciel, peg board, first assist;  Surgeon: Olayinka Redding MD;  Location: [facility];  Service: Orthopedics;  Laterality: Right;  RACIEL ORTHO CONFIRMED 11/20/DME FIRST ASSISTANT CONFIRMED 11/21/DME    HIP REPLACEMENT ARTHROPLASTY Right     INTRAOCULAR PROSTHESES INSERTION Left 09/08/2022    Procedure: INSERTION, IOL PROSTHESIS;  Surgeon: Soniya Macias MD;  Location: [address];  Service: Ophthalmology;  Laterality: Left;    INTRAOCULAR PROSTHESES INSERTION Right 01/26/2023    Procedure: INSERTION, IOL PROSTHESIS;   Surgeon: Soniya Macias MD;  Location: [address];  Service: Ophthalmology;  Laterality: Right;    PHACOEMULSIFICATION OF CATARACT Left 09/08/2022    Procedure: PHACOEMULSIFICATION, CATARACT;  Surgeon: Soniya Macias MD;  Location: [address];  Service: Ophthalmology;  Laterality: Left;    PHACOEMULSIFICATION OF CATARACT Right 01/26/2023    Procedure: PHACOEMULSIFICATION, CATARACT;  Surgeon: Soniya Macias MD;  Location: [address];  Service: Ophthalmology;  Laterality: Right;    TUBAL LIGATION       Family History   Problem Relation Age of Onset    Kidney failure Mother     Hypertension Mother         was on list to have kidney, lung, and heart transplant    Glaucoma Mother     Blindness Mother     Diabetes Mother     Stomach cancer Father     Breast cancer Sister 33        double mastectomy    Diabetes Sister     Heart attack Brother 40    Glaucoma Brother     Amblyopia Neg Hx     Cataracts Neg Hx     Macular degeneration Neg Hx     Retinal detachment Neg Hx     Strabismus Neg Hx      Social History     Tobacco Use    Smoking status: Every Day     Current packs/day: 0.25     Average packs/day: 0.3 packs/day for 40.0 years (10.0 ttl pk-yrs)     Types: Cigarettes    Smokeless tobacco: Never   Substance Use Topics    Alcohol use: No    Drug use: No     Review of Systems   Constitutional:  Positive for fatigue. Negative for chills and fever.   HENT:  Positive for congestion and rhinorrhea. Negative for sore throat.    Respiratory:  Positive for cough, chest tightness and shortness of breath. Negative for wheezing.    Gastrointestinal:  Negative for nausea.   Genitourinary:  Negative for dysuria.   Musculoskeletal:  Positive for myalgias.   Skin:  Negative for rash.   Hematological:  Does not bruise/bleed easily.       Physical Exam     Initial Vitals [09/15/23 1202]   BP Pulse Resp Temp SpO2   (!) 141/100 100 18 98.8 °F (37.1 °C) 100 %      MAP       --         Physical Exam    Nursing note and  vitals reviewed.  Constitutional: She appears well-developed and well-nourished. She is not diaphoretic. No distress.   HENT:   Head: Normocephalic and atraumatic.   Nose: Nose normal.   Mouth/Throat: No oropharyngeal exudate.   No erythema or edema of Posterior pharynx.    Eyes: Conjunctivae and EOM are normal.   Neck: Neck supple.   Cardiovascular:  Normal rate, regular rhythm, normal heart sounds and intact distal pulses.           Pulmonary/Chest: Breath sounds normal. No respiratory distress.   Speaks in full and complete sentences   Musculoskeletal:      Cervical back: Neck supple.     Neurological: She is alert and oriented to person, place, and time. Gait normal.   Skin: No rash noted.   Psychiatric: She has a normal mood and affect. Her behavior is normal. Judgment and thought content normal.         ED Course   Procedures  Labs Reviewed - No data to display    EKG Readings: (Independently Interpreted)   Initial Reading: No STEMI. Rhythm: Normal Sinus Rhythm. Heart Rate: 93. Ectopy: No Ectopy. Conduction: Normal. Axis: Left Axis Deviation. Clinical Impression: Normal Sinus Rhythm   T-wave inversion in V3-V6,III, aVF       Imaging Results    None          Medications - No data to display  Medical Decision Making  68 y.o. female with PMHx of T2DM, AVN, glaucoma, Graves disease, HTN presents to ED with shortness of breath x2 days.  She is nontoxic appearing.  Vitals with hypertension.  Pulse of 100.  Complains of shortness of breath though speaks in complete sentences.  SpO2 of 100%.  Will check ambulatory pulse ox and obtain chest x-ray.  I will re-evaluate.    Differential diagnosis includes but is not limited to severe COVID, pneumonia, pulmonary edema    Ambulatory pulse ox of 97-98%.  Chest x-ray negative for pulmonary edema, infiltrate, pleural effusion.  Discussed Paxlovid with patient as she is in intermediate risk category for severe COVID.  She declined at this time.  I believe this is appropriate as  her oxygen saturation has stayed between % during her stay and she remains afebrile.  No signs of pneumonia on chest x-ray.  Incidental pulmonary granuloma noted on imaging.  Discussed finding with patient advised to follow up with PCP for this.  I believe she is stable at this time for discharge.  Strict ED precautions given to return immediately for new, concerning, worsening symptoms.        Amount and/or Complexity of Data Reviewed  Radiology: ordered.    Risk  Prescription drug management.                               Clinical Impression:   Final diagnoses:  [R06.02] Shortness of breath  [U07.1] COVID-19               Renate Cardoso PA-C  09/15/23 181

## 2023-09-15 NOTE — DISCHARGE INSTRUCTIONS
No acute findings noted on your chest x-ray today  Calcified granuloma of lung noted on your x-ray.  Please follow-up with your PCP for this finding  Please follow CDC isolation guidelines for COVID-- quarantine for 5 days from your 1st symptom.  If you're afebrile in the 5th day you may end quarantine but you must wear mask for an additional 5 days.  Recommend that you take Tylenol or ibuprofen over-the-counter as needed.   Tessalon pearls prescribed for cough. Do not give to children as it can cause illness/death.  Return to the emergency department immediately if for new, concerning, worsening symptoms including but not limited to fever not relieved by Tylenol or ibuprofen, worsening or new shortness of breath or chest pain, other concerning symptoms.

## 2023-09-18 ENCOUNTER — TELEPHONE (OUTPATIENT)
Dept: PRIMARY CARE CLINIC | Facility: CLINIC | Age: 68
End: 2023-09-18
Payer: MEDICARE

## 2023-09-18 NOTE — TELEPHONE ENCOUNTER
----- Message from Oliva Pisano sent at 9/18/2023 11:02 AM CDT -----  Contact: [phone]  [email or web address]     Patient is calling for Medical Advice regarding: no smell and no taste     How long has patient had these symptoms: yesterday    Pharmacy name and phone#:   [facility]/pharmacy #[id number] - [place] - [address]  [address] [place] [address]  Phone: [phone] Fax: [phone]        Would like response via [employer]:  call back     Comments:    Pt tested positive for C-19 on Friday and on yesterday developed the above mentioned symptoms on top of the one's reported on Friday.

## 2023-09-18 NOTE — TELEPHONE ENCOUNTER
Patient is aware that is all a part of covid. She is aware that if she start to experience sob or chest pain to report to ed .

## 2023-09-19 ENCOUNTER — PATIENT MESSAGE (OUTPATIENT)
Dept: OTHER | Facility: OTHER | Age: 68
End: 2023-09-19
Payer: MEDICARE

## 2023-10-23 ENCOUNTER — PATIENT MESSAGE (OUTPATIENT)
Dept: PRIMARY CARE CLINIC | Facility: CLINIC | Age: 68
End: 2023-10-23
Payer: MEDICARE

## 2023-11-03 ENCOUNTER — TELEPHONE (OUTPATIENT)
Dept: INTERNAL MEDICINE | Facility: CLINIC | Age: 68
End: 2023-11-03
Payer: MEDICARE

## 2023-11-03 VITALS — SYSTOLIC BLOOD PRESSURE: 131 MMHG | DIASTOLIC BLOOD PRESSURE: 70 MMHG

## 2023-11-16 ENCOUNTER — OFFICE VISIT (OUTPATIENT)
Dept: PRIMARY CARE CLINIC | Facility: CLINIC | Age: 68
End: 2023-11-16
Payer: MEDICARE

## 2023-11-16 ENCOUNTER — LAB VISIT (OUTPATIENT)
Dept: LAB | Facility: HOSPITAL | Age: 68
End: 2023-11-16
Attending: INTERNAL MEDICINE
Payer: MEDICARE

## 2023-11-16 VITALS
HEART RATE: 75 BPM | SYSTOLIC BLOOD PRESSURE: 138 MMHG | BODY MASS INDEX: 38.52 KG/M2 | WEIGHT: 217.38 LBS | HEIGHT: 63 IN | DIASTOLIC BLOOD PRESSURE: 78 MMHG | OXYGEN SATURATION: 98 %

## 2023-11-16 DIAGNOSIS — E11.9 CONTROLLED TYPE 2 DIABETES MELLITUS WITHOUT COMPLICATION, WITHOUT LONG-TERM CURRENT USE OF INSULIN: ICD-10-CM

## 2023-11-16 DIAGNOSIS — E89.0 POSTABLATIVE HYPOTHYROIDISM: ICD-10-CM

## 2023-11-16 DIAGNOSIS — I10 HYPERTENSION, UNSPECIFIED TYPE: ICD-10-CM

## 2023-11-16 DIAGNOSIS — Z00.00 WELLNESS EXAMINATION: Primary | ICD-10-CM

## 2023-11-16 DIAGNOSIS — E78.5 HYPERLIPIDEMIA, UNSPECIFIED HYPERLIPIDEMIA TYPE: ICD-10-CM

## 2023-11-16 DIAGNOSIS — Z12.31 ENCOUNTER FOR SCREENING MAMMOGRAM FOR MALIGNANT NEOPLASM OF BREAST: ICD-10-CM

## 2023-11-16 DIAGNOSIS — Z23 NEEDS FLU SHOT: ICD-10-CM

## 2023-11-16 DIAGNOSIS — E11.65 TYPE 2 DIABETES MELLITUS WITH HYPERGLYCEMIA, WITHOUT LONG-TERM CURRENT USE OF INSULIN: ICD-10-CM

## 2023-11-16 PROBLEM — R73.02 IMPAIRED GLUCOSE TOLERANCE: Status: RESOLVED | Noted: 2022-04-20 | Resolved: 2023-11-16

## 2023-11-16 LAB
ALBUMIN SERPL BCP-MCNC: 3.8 G/DL (ref 3.5–5.2)
ALP SERPL-CCNC: 142 U/L (ref 55–135)
ALT SERPL W/O P-5'-P-CCNC: 20 U/L (ref 10–44)
ANION GAP SERPL CALC-SCNC: 9 MMOL/L (ref 8–16)
AST SERPL-CCNC: 17 U/L (ref 10–40)
BILIRUB SERPL-MCNC: 0.4 MG/DL (ref 0.1–1)
BUN SERPL-MCNC: 15 MG/DL (ref 8–23)
CALCIUM SERPL-MCNC: 9.8 MG/DL (ref 8.7–10.5)
CHLORIDE SERPL-SCNC: 110 MMOL/L (ref 95–110)
CHOLEST SERPL-MCNC: 175 MG/DL (ref 120–199)
CHOLEST/HDLC SERPL: 3 {RATIO} (ref 2–5)
CO2 SERPL-SCNC: 25 MMOL/L (ref 23–29)
CREAT SERPL-MCNC: 0.8 MG/DL (ref 0.5–1.4)
ERYTHROCYTE [DISTWIDTH] IN BLOOD BY AUTOMATED COUNT: 14.6 % (ref 11.5–14.5)
EST. GFR  (NO RACE VARIABLE): >60 ML/MIN/1.73 M^2
ESTIMATED AVG GLUCOSE: 143 MG/DL (ref 68–131)
GLUCOSE SERPL-MCNC: 96 MG/DL (ref 70–110)
HBA1C MFR BLD: 6.6 % (ref 4–5.6)
HCT VFR BLD AUTO: 40.2 % (ref 37–48.5)
HDLC SERPL-MCNC: 58 MG/DL (ref 40–75)
HDLC SERPL: 33.1 % (ref 20–50)
HGB BLD-MCNC: 12.1 G/DL (ref 12–16)
LDLC SERPL CALC-MCNC: 101.6 MG/DL (ref 63–159)
MCH RBC QN AUTO: 25.1 PG (ref 27–31)
MCHC RBC AUTO-ENTMCNC: 30.1 G/DL (ref 32–36)
MCV RBC AUTO: 83 FL (ref 82–98)
NONHDLC SERPL-MCNC: 117 MG/DL
PLATELET # BLD AUTO: 404 K/UL (ref 150–450)
PMV BLD AUTO: 10.4 FL (ref 9.2–12.9)
POTASSIUM SERPL-SCNC: 4.2 MMOL/L (ref 3.5–5.1)
PROT SERPL-MCNC: 7.7 G/DL (ref 6–8.4)
RBC # BLD AUTO: 4.83 M/UL (ref 4–5.4)
SODIUM SERPL-SCNC: 144 MMOL/L (ref 136–145)
T4 FREE SERPL-MCNC: 1.15 NG/DL (ref 0.71–1.51)
TRIGL SERPL-MCNC: 77 MG/DL (ref 30–150)
TSH SERPL DL<=0.005 MIU/L-ACNC: 0.02 UIU/ML (ref 0.4–4)
WBC # BLD AUTO: 7.4 K/UL (ref 3.9–12.7)

## 2023-11-16 PROCEDURE — 3075F SYST BP GE 130 - 139MM HG: CPT | Mod: CPTII,S$GLB,, | Performed by: INTERNAL MEDICINE

## 2023-11-16 PROCEDURE — 83036 HEMOGLOBIN GLYCOSYLATED A1C: CPT | Performed by: INTERNAL MEDICINE

## 2023-11-16 PROCEDURE — 84443 ASSAY THYROID STIM HORMONE: CPT | Performed by: INTERNAL MEDICINE

## 2023-11-16 PROCEDURE — 80053 COMPREHEN METABOLIC PANEL: CPT | Performed by: INTERNAL MEDICINE

## 2023-11-16 PROCEDURE — G0008 FLU VACCINE - QUADRIVALENT - ADJUVANTED: ICD-10-PCS | Mod: S$GLB,,, | Performed by: INTERNAL MEDICINE

## 2023-11-16 PROCEDURE — 99999 PR PBB SHADOW E&M-EST. PATIENT-LVL III: CPT | Mod: PBBFAC,,, | Performed by: INTERNAL MEDICINE

## 2023-11-16 PROCEDURE — 36415 COLL VENOUS BLD VENIPUNCTURE: CPT | Performed by: INTERNAL MEDICINE

## 2023-11-16 PROCEDURE — 99397 PER PM REEVAL EST PAT 65+ YR: CPT | Mod: GZ,S$GLB,, | Performed by: INTERNAL MEDICINE

## 2023-11-16 PROCEDURE — 80061 LIPID PANEL: CPT | Performed by: INTERNAL MEDICINE

## 2023-11-16 PROCEDURE — 3008F PR BODY MASS INDEX (BMI) DOCUMENTED: ICD-10-PCS | Mod: CPTII,S$GLB,, | Performed by: INTERNAL MEDICINE

## 2023-11-16 PROCEDURE — G0008 ADMIN INFLUENZA VIRUS VAC: HCPCS | Mod: S$GLB,,, | Performed by: INTERNAL MEDICINE

## 2023-11-16 PROCEDURE — 3008F BODY MASS INDEX DOCD: CPT | Mod: CPTII,S$GLB,, | Performed by: INTERNAL MEDICINE

## 2023-11-16 PROCEDURE — 3075F PR MOST RECENT SYSTOLIC BLOOD PRESS GE 130-139MM HG: ICD-10-PCS | Mod: CPTII,S$GLB,, | Performed by: INTERNAL MEDICINE

## 2023-11-16 PROCEDURE — 85027 COMPLETE CBC AUTOMATED: CPT | Performed by: INTERNAL MEDICINE

## 2023-11-16 PROCEDURE — 3044F PR MOST RECENT HEMOGLOBIN A1C LEVEL <7.0%: ICD-10-PCS | Mod: CPTII,S$GLB,, | Performed by: INTERNAL MEDICINE

## 2023-11-16 PROCEDURE — 99999 PR PBB SHADOW E&M-EST. PATIENT-LVL III: ICD-10-PCS | Mod: PBBFAC,,, | Performed by: INTERNAL MEDICINE

## 2023-11-16 PROCEDURE — 90694 VACC AIIV4 NO PRSRV 0.5ML IM: CPT | Mod: S$GLB,,, | Performed by: INTERNAL MEDICINE

## 2023-11-16 PROCEDURE — 1159F PR MEDICATION LIST DOCUMENTED IN MEDICAL RECORD: ICD-10-PCS | Mod: CPTII,S$GLB,, | Performed by: INTERNAL MEDICINE

## 2023-11-16 PROCEDURE — 90694 FLU VACCINE - QUADRIVALENT - ADJUVANTED: ICD-10-PCS | Mod: S$GLB,,, | Performed by: INTERNAL MEDICINE

## 2023-11-16 PROCEDURE — 99397 PR PREVENTIVE VISIT,EST,65 & OVER: ICD-10-PCS | Mod: GZ,S$GLB,, | Performed by: INTERNAL MEDICINE

## 2023-11-16 PROCEDURE — 3078F DIAST BP <80 MM HG: CPT | Mod: CPTII,S$GLB,, | Performed by: INTERNAL MEDICINE

## 2023-11-16 PROCEDURE — 3044F HG A1C LEVEL LT 7.0%: CPT | Mod: CPTII,S$GLB,, | Performed by: INTERNAL MEDICINE

## 2023-11-16 PROCEDURE — 3078F PR MOST RECENT DIASTOLIC BLOOD PRESSURE < 80 MM HG: ICD-10-PCS | Mod: CPTII,S$GLB,, | Performed by: INTERNAL MEDICINE

## 2023-11-16 PROCEDURE — 1159F MED LIST DOCD IN RCRD: CPT | Mod: CPTII,S$GLB,, | Performed by: INTERNAL MEDICINE

## 2023-11-16 PROCEDURE — 84439 ASSAY OF FREE THYROXINE: CPT | Performed by: INTERNAL MEDICINE

## 2023-11-16 RX ORDER — METFORMIN HYDROCHLORIDE 1000 MG/1
500 TABLET ORAL
Qty: 90 TABLET | Refills: 3 | Status: SHIPPED | OUTPATIENT
Start: 2023-11-16

## 2023-11-16 RX ORDER — ATORVASTATIN CALCIUM 10 MG/1
10 TABLET, FILM COATED ORAL DAILY
Qty: 90 TABLET | Refills: 3 | Status: SHIPPED | OUTPATIENT
Start: 2023-11-16 | End: 2024-01-02

## 2023-11-16 NOTE — PROGRESS NOTES
[facility] Note    Chief Complaint      Chief Complaint   Patient presents with    Annual Exam     History of Present Illness      Carolee Bartlett is a 68 y.o. female who presents today for chief complaint annual wellness .   HPI   Last seen 10.22 annual  Feels well today no complaints, due for annual labs     preDM on metformin once daily, now I'm DM range last albs, will get utd labs today   Lab Results   Component Value Date    HGBA1C 6.9 (H) 2023       HTN at goal on arb - she is in dig HTN so she checks her BP at home daily   HLD and DM is on nd statin, due for FLP  CMP Microalb last week normal as well, cbc unremarkable   Post ablative hypoth TSH  not doen was subclin hyper earlier this year , meds were reduced earlier 112->100 this year   Sees ophthalomology [place], cataract suregy went well, she needs dm eye exam, will send message   Hip pain last year aftyer fall, had been going to PT but she is resuming, having some low back pain stiffness      Covid in 9. she recovered well   mmg 1.23  Colorectal scope 3.  dexa 9.22 openia not high fx risk,  resulrts discussed   Gyn does not have      Retired  for [employer]   Her sister  in October of [facility], no other changes to fam med hx   Active Problem List with Overview Notes    Diagnosis Date Noted    Controlled type 2 diabetes mellitus without complication, without long-term current use of insulin 2023    Decreased range of right hip movement 2023    Hip pain 2022    Glaucoma 2022    Hyperlipidemia 2022    Hypertension 2022    Muscle cramps 2022    Avascular necrosis of right femur 2018    S/P total hip arthroplasty 2018    Postablative hypothyroidism 2018       Health Maintenance   Topic Date Due    Foot Exam  Never done    TETANUS VACCINE  Never done    Shingles Vaccine (1 of 2) Never done    Eye Exam  2023    Lipid Panel  2023     Hemoglobin A1c  12/07/2023    Mammogram  01/10/2024    Low Dose Statin  09/01/2024    DEXA Scan  09/23/2024    Colorectal Cancer Screening  03/27/2028    Hepatitis C Screening  Completed       Past Medical History:   Diagnosis Date    Avascular necrosis     Diabetes mellitus     Encounter for blood transfusion     Glaucoma     Graves disease     Hypertension     Other abnormal glucose     Pre-Diabetic    Sciatica        Past Surgical History:   Procedure Laterality Date    CATARACT EXTRACTION W/  INTRAOCULAR LENS IMPLANT Right 01/26/2023        CATARACT EXTRACTION W/  INTRAOCULAR LENS IMPLANT Left 09/08/2022        COLONOSCOPY N/A 03/27/2023    Procedure: COLONOSCOPY;  Surgeon: Yohannes Francois MD;  Location: [facility] ENDOSCOPY;  Service: Endoscopy;  Laterality: N/A;  instr via portal; AP  3/24 pre call complete, pt stated she would have a ride -CE    HIP ARTHROPLASTY Right 11/26/2018    Procedure: ARTHROPLASTY, HIP, Raciel, peg board, first assist;  Surgeon: Olayinka Redding MD;  Location: [facility];  Service: Orthopedics;  Laterality: Right;  RACIEL ORTHO CONFIRMED 11/20/DME FIRST ASSISTANT CONFIRMED 11/21/DME    HIP REPLACEMENT ARTHROPLASTY Right     INTRAOCULAR PROSTHESES INSERTION Left 09/08/2022    Procedure: INSERTION, IOL PROSTHESIS;  Surgeon: Soniya Macias MD;  Location: [facility] OR [address] [place] [address];  Service: Ophthalmology;  Laterality: Left;    INTRAOCULAR PROSTHESES INSERTION Right 01/26/2023    Procedure: INSERTION, IOL PROSTHESIS;  Surgeon: Soniya Macias MD;  Location: [facility] OR [address] [place] [address];  Service: Ophthalmology;  Laterality: Right;    PHACOEMULSIFICATION OF CATARACT Left 09/08/2022    Procedure: PHACOEMULSIFICATION, CATARACT;  Surgeon: Soniya Macias MD;  Location: [facility] OR [address] [place] [address];  Service: Ophthalmology;  Laterality: Left;    PHACOEMULSIFICATION OF CATARACT Right 01/26/2023    Procedure: PHACOEMULSIFICATION, CATARACT;  Surgeon: Soniya Macias MD;  Location: [facility] OR [address] [place] [address];  Service:  Ophthalmology;  Laterality: Right;    TUBAL LIGATION         family history includes Blindness in her mother; Breast cancer (age of onset: 33) in her sister; Diabetes in her mother and sister; Glaucoma in her brother and mother; Heart attack (age of onset: 40) in her brother; Hypertension in her mother; Kidney failure in her mother; Stomach cancer in her father.    Social History     Tobacco Use    Smoking status: Every Day     Current packs/day: 0.25     Average packs/day: 0.3 packs/day for 40.0 years (10.0 ttl pk-yrs)     Types: Cigarettes    Smokeless tobacco: Never   Substance Use Topics    Alcohol use: No    Drug use: No       Review of Systems   Constitutional:  Negative for chills, fever, malaise/fatigue and weight loss.   Respiratory:  Negative for cough, sputum production, shortness of breath and wheezing.    Cardiovascular:  Negative for chest pain, palpitations, orthopnea and leg swelling.   Gastrointestinal:  Negative for constipation, diarrhea, nausea and vomiting.   Genitourinary:  Negative for dysuria, frequency, hematuria and urgency.        Outpatient Encounter Medications as of 11/16/2023   Medication Sig Dispense Refill    cycloSPORINE (RESTASIS) 0.05 % ophthalmic emulsion Place 1 drop into both eyes 2 (two) times daily. 60 each 12    ibuprofen (ADVIL,MOTRIN) 600 MG tablet Take 1 tablet (600 mg total) by mouth every 8 (eight) hours as needed for Pain. 30 tablet 0    latanoprost 0.005 % ophthalmic solution INSTILL 1 DROP INTO BOTH EYES IN THE EVENING 7.5 mL 4    levothyroxine (SYNTHROID) 100 MCG tablet TAKE 1 TABLET BY MOUTH BEFORE BREAKFAST. 90 tablet 3    losartan-hydrochlorothiazide 50-12.5 mg (HYZAAR) 50-12.5 mg per tablet TAKE 1 TABLET BY MOUTH EVERY DAY 90 tablet 3    [DISCONTINUED] atorvastatin (LIPITOR) 10 MG tablet TAKE 1 TABLET BY MOUTH EVERY DAY 90 tablet 3    [DISCONTINUED] metFORMIN (GLUCOPHAGE) 1000 MG tablet Take 0.5 tablets (500 mg total) by mouth daily with breakfast. 90 tablet 1  "   atorvastatin (LIPITOR) 10 MG tablet Take 1 tablet (10 mg total) by mouth once daily. 90 tablet 3    metFORMIN (GLUCOPHAGE) 1000 MG tablet Take 0.5 tablets (500 mg total) by mouth daily with breakfast. 90 tablet 3    penicillin v potassium (VEETID) 500 MG tablet TAKE 1 TABLET BY MOUTH 3 TIMES A DAY UNTIL GONE      [DISCONTINUED] lifitegrast (XIIDRA) 5 % Dpet Place 1 drop into both eyes 2 (two) times a day.       No facility-administered encounter medications on file as of 11/16/2023.        Review of patient's allergies indicates:  No Known Allergies        Physical Exam      Vital Signs  Pulse: 75  SpO2: 98 %  BP: 138/78  BP Location: Right arm  Patient Position: Sitting  Height and Weight  Height: 5' 3" (160 cm)  Weight: 98.6 kg (217 lb 6 oz)  BSA (Calculated - sq m): 2.09 sq meters  BMI (Calculated): 38.5  Weight in (lb) to have BMI = 25: 140.8]    Physical Exam  Vitals reviewed.   Constitutional:       General: She is not in acute distress.     Appearance: She is well-developed. She is not diaphoretic.   HENT:      Head: Normocephalic and atraumatic.      Right Ear: External ear normal.      Left Ear: External ear normal.      Nose: Nose normal.   Eyes:      General:         Right eye: No discharge.         Left eye: No discharge.      Conjunctiva/sclera: Conjunctivae normal.   Cardiovascular:      Rate and Rhythm: Normal rate and regular rhythm.      Heart sounds: Normal heart sounds.   Pulmonary:      Effort: Pulmonary effort is normal. No respiratory distress.      Breath sounds: Normal breath sounds.   Musculoskeletal:         General: Normal range of motion.      Cervical back: Normal range of motion.   Skin:     Coloration: Skin is not pale.      Findings: No rash.   Neurological:      Mental Status: She is alert and oriented to person, place, and time.   Psychiatric:         Behavior: Behavior normal.         Thought Content: Thought content normal.          Laboratory:  CBC:  No results for input(s): " ""WBC", "RBC", "HGB", "HCT", "PLT", "MCV", "MCH", "MCHC" in the last 2160 hours.  CMP:  No results for input(s): "GLU", "CALCIUM", "ALBUMIN", "PROT", "NA", "K", "CO2", "CL", "BUN", "ALKPHOS", "ALT", "AST", "BILITOT" in the last 2160 hours.    Invalid input(s): "CREATININ"  URINALYSIS:  No results for input(s): "COLORU", "CLARITYU", "SPECGRAV", "PHUR", "PROTEINUA", "GLUCOSEU", "BILIRUBINCON", "BLOODU", "WBCU", "RBCU", "BACTERIA", "MUCUS", "NITRITE", "LEUKOCYTESUR", "UROBILINOGEN", "HYALINECASTS" in the last 2160 hours.   LIPIDS:  No results for input(s): "TSH", "HDL", "CHOL", "TRIG", "LDLCALC", "CHOLHDL", "NONHDLCHOL", "TOTALCHOLEST" in the last 2160 hours.  TSH:  No results for input(s): "TSH" in the last 2160 hours.  A1C:  No results for input(s): "HGBA1C" in the last 2160 hours.    Radiology:      Assessment/Plan     Carolee Bartlett is a 68 y.o.female with:    1. Wellness examination    2. Controlled type 2 diabetes mellitus without complication, without long-term current use of insulin  -     Comprehensive Metabolic Panel; Future; Expected date: 11/16/2023  -     Lipid Panel; Future; Expected date: 11/16/2023  -     Microalbumin/Creatinine Ratio, Urine; Future  -     Hemoglobin A1C; Future; Expected date: 11/16/2023  -     CBC Without Differential; Future; Expected date: 11/16/2023    3. Postablative hypothyroidism  Assessment & Plan:  Due for labs    Orders:  -     TSH; Future; Expected date: 11/16/2023    4. Hyperlipidemia, unspecified hyperlipidemia type  Assessment & Plan:  Due for labs     Orders:  -     Lipid Panel; Future; Expected date: 11/16/2023  -     atorvastatin (LIPITOR) 10 MG tablet; Take 1 tablet (10 mg total) by mouth once daily.  Dispense: 90 tablet; Refill: 3    5. Hypertension, unspecified type  Assessment & Plan:  At goal no change    Orders:  -     Comprehensive Metabolic Panel; Future; Expected date: 11/16/2023  -     CBC Without Differential; Future; Expected date: 11/16/2023    6. " Encounter for screening mammogram for malignant neoplasm of breast  -     Mammo Digital Screening Bilat w/ Carlos; Future; Expected date: 01/11/2024    7. Type 2 diabetes mellitus with hyperglycemia, without long-term current use of insulin  -     metFORMIN (GLUCOPHAGE) 1000 MG tablet; Take 0.5 tablets (500 mg total) by mouth daily with breakfast.  Dispense: 90 tablet; Refill: 3    8. Needs flu shot  -     Influenza (FLUAD) - Quadrivalent (Adjuvanted) *Preferred* (65+) (PF)         Use of the [employer] Patient Portal discussed and encouraged during today's visit  -Continue current medications and maintain follow up with specialists.  Return to clinic in 1 year .  Future Appointments   Date Time Provider Department Center   12/13/2023 10:15 AM ALMARAZ, [facility]   12/13/2023 10:45 AM Soniya Macias MD [facility] MD  11/16/2023 9:29 AM    [facility]

## 2023-11-16 NOTE — Clinical Note
Has she had a diabetic retinal exam? If not can you do one when you see her on on 12.12? Thanks Renata

## 2023-12-12 NOTE — PROGRESS NOTES
Subjective:       Patient ID: Carolee Bartlett is a 68 y.o. female.    Chief Complaint: Glaucoma     HPI    Pt reports vision is stable. She reports compliance with eye drops as   prescribed.   Last edited by Soniya Macias MD on 12/13/2023 12:08 PM.              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    Pseudophakia, both eyes    Dry eye syndrome of both lacrimal glands        MMG severe OU   Was CACG now post CE  -Fhx (+Mother, +Brother), Steroids (),Trauma()  -Drops: Latanoprost qHS OU (started 2/21/22)  -Drop intolerance/contraindication: -  -Laser:-  -Surgeries: CE IOL OU  -CCT: 605 // 640 thick  -Gonio: SS with pigment  IOP max: 21//23  IOP goal: 12    6/23 HVF 24-2 SAD/IAD [place] VFI 43% OD // SALT/ IAD VFI 28% OS with high FN OU -->++Prog OU --> HVF and OCT do not match   6/23 RNFL Dec TI OD // Dec G/TS/TI B T OS stable OU     12/23 HVF 24-2 SAD/IAD VFI 89% OD // SAD/IAD VFI 79% OS --> improved!    Discussed severe stage glaucoma -- will try to meet target IOP 12   Discussed SLT v change gtt (cosopt v rocklatan) --> pt prefers rocklatan     PRASHANTH OU  Hx Lupus  Started Xiidra 2/21/22 --> failed --> try restasis (sent 9/1/23) --> Improved!! And covered by insurance --> continue   Pres free AT's about 6 x day   Gel drops or ointment at night   Consider punctal plugs / doxy PRN  Anti-SSA / SSB negative    Pseudophakia OU  Lenses WC  OD DIBOO  +21.00 target -0.13   OS DIBOO +21.50 target -0.05   VA great      Graves disease  No overt E/o ELMER on exam    Monitor     Eyelid ptosis  Hemifacial spasm / synkinesis  Dr. Burden    PLAN  Stop latan  Start rocklatan     Cont restasis       RTC 6 weeks IOP check and gonio    Soniya Macias M.D., M.S.  Department of Ophthalmology   Division of Glaucoma Surgery  [facility]

## 2023-12-13 ENCOUNTER — OFFICE VISIT (OUTPATIENT)
Dept: OPHTHALMOLOGY | Facility: CLINIC | Age: 68
End: 2023-12-13
Payer: MEDICARE

## 2023-12-13 ENCOUNTER — CLINICAL SUPPORT (OUTPATIENT)
Dept: OPHTHALMOLOGY | Facility: CLINIC | Age: 68
End: 2023-12-13
Payer: MEDICARE

## 2023-12-13 DIAGNOSIS — Z96.1 PSEUDOPHAKIA, BOTH EYES: ICD-10-CM

## 2023-12-13 DIAGNOSIS — M35.01 KERATOCONJUNCTIVITIS SICCA OF BOTH EYES: ICD-10-CM

## 2023-12-13 DIAGNOSIS — G51.31 HEMIFACIAL SPASM OF RIGHT SIDE OF FACE: ICD-10-CM

## 2023-12-13 DIAGNOSIS — H02.403 PTOSIS, BILATERAL: ICD-10-CM

## 2023-12-13 DIAGNOSIS — H40.53X3 SEVERE STAGE SECONDARY GLAUCOMA OF BOTH EYES DUE TO COMBINATION MECHANISMS: Primary | ICD-10-CM

## 2023-12-13 DIAGNOSIS — H04.123 DRY EYE SYNDROME OF BOTH LACRIMAL GLANDS: ICD-10-CM

## 2023-12-13 PROCEDURE — 3044F PR MOST RECENT HEMOGLOBIN A1C LEVEL <7.0%: ICD-10-PCS | Mod: CPTII,S$GLB,, | Performed by: STUDENT IN AN ORGANIZED HEALTH CARE EDUCATION/TRAINING PROGRAM

## 2023-12-13 PROCEDURE — 1159F PR MEDICATION LIST DOCUMENTED IN MEDICAL RECORD: ICD-10-PCS | Mod: CPTII,S$GLB,, | Performed by: STUDENT IN AN ORGANIZED HEALTH CARE EDUCATION/TRAINING PROGRAM

## 2023-12-13 PROCEDURE — 1160F RVW MEDS BY RX/DR IN RCRD: CPT | Mod: CPTII,S$GLB,, | Performed by: STUDENT IN AN ORGANIZED HEALTH CARE EDUCATION/TRAINING PROGRAM

## 2023-12-13 PROCEDURE — 99214 PR OFFICE/OUTPT VISIT, EST, LEVL IV, 30-39 MIN: ICD-10-PCS | Mod: S$GLB,,, | Performed by: STUDENT IN AN ORGANIZED HEALTH CARE EDUCATION/TRAINING PROGRAM

## 2023-12-13 PROCEDURE — 92250 COLOR FUNDUS PHOTOGRAPHY - OU - BOTH EYES: ICD-10-PCS | Mod: S$GLB,,, | Performed by: STUDENT IN AN ORGANIZED HEALTH CARE EDUCATION/TRAINING PROGRAM

## 2023-12-13 PROCEDURE — 3061F NEG MICROALBUMINURIA REV: CPT | Mod: CPTII,S$GLB,, | Performed by: STUDENT IN AN ORGANIZED HEALTH CARE EDUCATION/TRAINING PROGRAM

## 2023-12-13 PROCEDURE — 3044F HG A1C LEVEL LT 7.0%: CPT | Mod: CPTII,S$GLB,, | Performed by: STUDENT IN AN ORGANIZED HEALTH CARE EDUCATION/TRAINING PROGRAM

## 2023-12-13 PROCEDURE — 3066F PR DOCUMENTATION OF TREATMENT FOR NEPHROPATHY: ICD-10-PCS | Mod: CPTII,S$GLB,, | Performed by: STUDENT IN AN ORGANIZED HEALTH CARE EDUCATION/TRAINING PROGRAM

## 2023-12-13 PROCEDURE — 99999 PR PBB SHADOW E&M-EST. PATIENT-LVL II: ICD-10-PCS | Mod: PBBFAC,,, | Performed by: STUDENT IN AN ORGANIZED HEALTH CARE EDUCATION/TRAINING PROGRAM

## 2023-12-13 PROCEDURE — 92250 FUNDUS PHOTOGRAPHY W/I&R: CPT | Mod: S$GLB,,, | Performed by: STUDENT IN AN ORGANIZED HEALTH CARE EDUCATION/TRAINING PROGRAM

## 2023-12-13 PROCEDURE — 1160F PR REVIEW ALL MEDS BY PRESCRIBER/CLIN PHARMACIST DOCUMENTED: ICD-10-PCS | Mod: CPTII,S$GLB,, | Performed by: STUDENT IN AN ORGANIZED HEALTH CARE EDUCATION/TRAINING PROGRAM

## 2023-12-13 PROCEDURE — 1159F MED LIST DOCD IN RCRD: CPT | Mod: CPTII,S$GLB,, | Performed by: STUDENT IN AN ORGANIZED HEALTH CARE EDUCATION/TRAINING PROGRAM

## 2023-12-13 PROCEDURE — 92083 EXTENDED VISUAL FIELD XM: CPT | Mod: S$GLB,,, | Performed by: STUDENT IN AN ORGANIZED HEALTH CARE EDUCATION/TRAINING PROGRAM

## 2023-12-13 PROCEDURE — 3066F NEPHROPATHY DOC TX: CPT | Mod: CPTII,S$GLB,, | Performed by: STUDENT IN AN ORGANIZED HEALTH CARE EDUCATION/TRAINING PROGRAM

## 2023-12-13 PROCEDURE — 3061F PR NEG MICROALBUMINURIA RESULT DOCUMENTED/REVIEW: ICD-10-PCS | Mod: CPTII,S$GLB,, | Performed by: STUDENT IN AN ORGANIZED HEALTH CARE EDUCATION/TRAINING PROGRAM

## 2023-12-13 PROCEDURE — 99214 OFFICE O/P EST MOD 30 MIN: CPT | Mod: S$GLB,,, | Performed by: STUDENT IN AN ORGANIZED HEALTH CARE EDUCATION/TRAINING PROGRAM

## 2023-12-13 PROCEDURE — 92083 HUMPHREY VISUAL FIELD - OU - BOTH EYES: ICD-10-PCS | Mod: S$GLB,,, | Performed by: STUDENT IN AN ORGANIZED HEALTH CARE EDUCATION/TRAINING PROGRAM

## 2023-12-13 PROCEDURE — 99999 PR PBB SHADOW E&M-EST. PATIENT-LVL II: CPT | Mod: PBBFAC,,, | Performed by: STUDENT IN AN ORGANIZED HEALTH CARE EDUCATION/TRAINING PROGRAM

## 2023-12-13 RX ORDER — NETARSUDIL AND LATANOPROST OPHTHALMIC SOLUTION, 0.02%/0.005% .2; .05 MG/ML; MG/ML
1 SOLUTION/ DROPS OPHTHALMIC; TOPICAL NIGHTLY
Qty: 2.5 ML | Refills: 12 | Status: SHIPPED | OUTPATIENT
Start: 2023-12-13 | End: 2024-03-06 | Stop reason: ALTCHOICE

## 2023-12-13 NOTE — PROGRESS NOTES
Visual field test done.  Patient stated no latex allergies used coverlet      er Axis Dist VA Add     Right +0.75 +0.75 094 20/40+2 +2.50   Left -0.50 +0.75 057 20/25-2 +2.50

## 2024-01-16 ENCOUNTER — HOSPITAL ENCOUNTER (OUTPATIENT)
Dept: RADIOLOGY | Facility: HOSPITAL | Age: 69
Discharge: HOME | End: 2024-01-16
Attending: INTERNAL MEDICINE
Payer: MEDICARE

## 2024-01-16 VITALS — BODY MASS INDEX: 38.45 KG/M2 | HEIGHT: 63 IN | WEIGHT: 217 LBS

## 2024-01-16 DIAGNOSIS — Z12.31 ENCOUNTER FOR SCREENING MAMMOGRAM FOR MALIGNANT NEOPLASM OF BREAST: ICD-10-CM

## 2024-01-16 PROCEDURE — 77067 SCR MAMMO BI INCL CAD: CPT | Mod: TC

## 2024-01-16 PROCEDURE — 77067 SCR MAMMO BI INCL CAD: CPT | Mod: 26,,, | Performed by: RADIOLOGY

## 2024-01-16 PROCEDURE — 77063 BREAST TOMOSYNTHESIS BI: CPT | Mod: 26,,, | Performed by: RADIOLOGY

## 2024-02-27 ENCOUNTER — PATIENT MESSAGE (OUTPATIENT)
Dept: ADMINISTRATIVE | Facility: OTHER | Age: 69
End: 2024-02-27
Payer: MEDICARE

## 2024-03-04 NOTE — PROGRESS NOTES
Subjective:       Patient ID: Carolee Bartlett is a 69 y.o. female.    Chief Complaint: Glaucoma     HPI    DLS: 12/13/23 w/ Dr. Macias    69 y.o. female presents for 6 week IOP Check. Patient denies changes to   VA, pain, irritation, and headaches. Complains of redness but no burning   or itching with new gtts.     1. Severe MMG OU  2. KCS/PRASHANTH  3. Hx Lupus  4. PCIOLOU  5. Graves Dz  6. Ptosis  7. Hemifacial Spasms / Synkinesis    MEDS:  Restasis BID OU  Rocklatan qhs OU  Systane AT's PRN OU  Last edited by Gem Oropeza on 3/6/2024  8:19 AM.                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  Dispense: 60 each; Refill: 6    Keratoconjunctivitis sicca of both eyes  -     dorzolamide-timolol, PF, (COSOPT PF) 2-0.5 % Dpet ophthalmic solution; Place 1 drop into both eyes 2 (two) times daily.  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    Discussed severe stage glaucoma -- will try to meet target IOP 12   Discussed SLT v change gtt (cosopt v rocklatan) --> pt prefers rocklatan --> v red and insurance issues    Will try preservative holiday --> stop rock  Start   Iyuza qHS PM OU  Cosopt-PF BID OU    PRASHANTH OU  Hx Lupus  Started Xiidra  2/21/22 --> failed --> try restasis (sent 9/1/23) --> Improved!! And covered by insurance --> continue   Pres free AT's about 6 x day   Gel drops or ointment at night   Consider punctal plugs / doxy PRN  Anti-SSA / SSB negative  Will try PF-glc gtts as above    Pseudophakia OU  Lenses WC  OD DIBOO  +21.00 target -0.13   OS DIBOO +21.50 target -0.05   VA great      Graves disease  No overt E/o ELMER on exam   Monitor     Eyelid ptosis  Hemifacial spasm / synkinesis  Dr. Burden    PLAN  Will try preservative holiday --> stop rocklatan  Start   Iyuza qHS PM OU  Cosopt-PF BID OU    Cont restasis       RTC 6 weeks IOP check        Soniya Macias M.D., M.S.  Department of Ophthalmology   Division of Glaucoma Surgery  [facility]

## 2024-03-06 ENCOUNTER — LAB VISIT (OUTPATIENT)
Dept: LAB | Facility: HOSPITAL | Age: 69
End: 2024-03-06
Attending: INTERNAL MEDICINE
Payer: MEDICARE

## 2024-03-06 ENCOUNTER — OFFICE VISIT (OUTPATIENT)
Dept: OPHTHALMOLOGY | Facility: CLINIC | Age: 69
End: 2024-03-06
Payer: MEDICARE

## 2024-03-06 DIAGNOSIS — H02.403 PTOSIS, BILATERAL: ICD-10-CM

## 2024-03-06 DIAGNOSIS — M35.01 KERATOCONJUNCTIVITIS SICCA OF BOTH EYES: ICD-10-CM

## 2024-03-06 DIAGNOSIS — H40.53X3 SEVERE STAGE SECONDARY GLAUCOMA OF BOTH EYES DUE TO COMBINATION MECHANISMS: Primary | ICD-10-CM

## 2024-03-06 DIAGNOSIS — E78.49 OTHER HYPERLIPIDEMIA: ICD-10-CM

## 2024-03-06 DIAGNOSIS — G51.31 HEMIFACIAL SPASM OF RIGHT SIDE OF FACE: ICD-10-CM

## 2024-03-06 LAB
ALBUMIN SERPL BCP-MCNC: 3.9 G/DL (ref 3.5–5.2)
ALP SERPL-CCNC: 133 U/L (ref 55–135)
ALT SERPL W/O P-5'-P-CCNC: 19 U/L (ref 10–44)
ANION GAP SERPL CALC-SCNC: 7 MMOL/L (ref 8–16)
AST SERPL-CCNC: 16 U/L (ref 10–40)
BILIRUB SERPL-MCNC: 0.3 MG/DL (ref 0.1–1)
BUN SERPL-MCNC: 11 MG/DL (ref 8–23)
CALCIUM SERPL-MCNC: 9.9 MG/DL (ref 8.7–10.5)
CHLORIDE SERPL-SCNC: 108 MMOL/L (ref 95–110)
CHOLEST SERPL-MCNC: 167 MG/DL (ref 120–199)
CHOLEST/HDLC SERPL: 2.9 {RATIO} (ref 2–5)
CO2 SERPL-SCNC: 25 MMOL/L (ref 23–29)
CREAT SERPL-MCNC: 0.9 MG/DL (ref 0.5–1.4)
EST. GFR  (NO RACE VARIABLE): >60 ML/MIN/1.73 M^2
GLUCOSE SERPL-MCNC: 116 MG/DL (ref 70–110)
HDLC SERPL-MCNC: 57 MG/DL (ref 40–75)
HDLC SERPL: 34.1 % (ref 20–50)
LDLC SERPL CALC-MCNC: 87.6 MG/DL (ref 63–159)
NONHDLC SERPL-MCNC: 110 MG/DL
POTASSIUM SERPL-SCNC: 4 MMOL/L (ref 3.5–5.1)
PROT SERPL-MCNC: 7.8 G/DL (ref 6–8.4)
SODIUM SERPL-SCNC: 140 MMOL/L (ref 136–145)
TRIGL SERPL-MCNC: 112 MG/DL (ref 30–150)
TSH SERPL DL<=0.005 MIU/L-ACNC: 0.48 UIU/ML (ref 0.4–4)

## 2024-03-06 PROCEDURE — 80061 LIPID PANEL: CPT | Performed by: INTERNAL MEDICINE

## 2024-03-06 PROCEDURE — 36415 COLL VENOUS BLD VENIPUNCTURE: CPT | Performed by: INTERNAL MEDICINE

## 2024-03-06 PROCEDURE — 99999 PR PBB SHADOW E&M-EST. PATIENT-LVL III: CPT | Mod: PBBFAC,,, | Performed by: STUDENT IN AN ORGANIZED HEALTH CARE EDUCATION/TRAINING PROGRAM

## 2024-03-06 PROCEDURE — 80053 COMPREHEN METABOLIC PANEL: CPT | Performed by: INTERNAL MEDICINE

## 2024-03-06 PROCEDURE — 99214 OFFICE O/P EST MOD 30 MIN: CPT | Mod: S$GLB,,, | Performed by: STUDENT IN AN ORGANIZED HEALTH CARE EDUCATION/TRAINING PROGRAM

## 2024-03-06 PROCEDURE — 84443 ASSAY THYROID STIM HORMONE: CPT | Performed by: INTERNAL MEDICINE

## 2024-03-06 RX ORDER — DORZOLAMIDE HYDROCHLORIDE AND TIMOLOL MALEATE PRESERVATIVE FREE 20; 5 MG/ML; MG/ML
1 SOLUTION/ DROPS OPHTHALMIC 2 TIMES DAILY
Qty: 60 EACH | Refills: 6 | Status: SHIPPED | OUTPATIENT
Start: 2024-03-06 | End: 2025-03-06

## 2024-03-06 NOTE — PATIENT INSTRUCTIONS
When lamaverick runs out    Start  Iyuzeh -- Latanoprost preservative free - 1 drop both eyes nightly    Dorzolamide-timolol-preservative free -- 1 drop both eyes AM and PM

## 2024-03-10 ENCOUNTER — PATIENT MESSAGE (OUTPATIENT)
Dept: PRIMARY CARE CLINIC | Facility: CLINIC | Age: 69
End: 2024-03-10
Payer: MEDICARE

## 2024-03-10 DIAGNOSIS — E11.9 CONTROLLED TYPE 2 DIABETES MELLITUS WITHOUT COMPLICATION, WITHOUT LONG-TERM CURRENT USE OF INSULIN: Primary | ICD-10-CM

## 2024-04-04 DIAGNOSIS — I10 PRIMARY HYPERTENSION: ICD-10-CM

## 2024-04-04 NOTE — TELEPHONE ENCOUNTER
Care Due:                  Date            Visit Type   Department     Provider  --------------------------------------------------------------------------------                                HOSPITAL     [facility]  Last Visit: 11-      FOLLOW UP    CARE           Renata Gomez  Next Visit: None Scheduled  None         None Found                                                            Last  Test          Frequency    Reason                     Performed    Due Date  --------------------------------------------------------------------------------    HBA1C.......  6 months...  metFORMIN................  11- 05-    [facility] Embedded Care Due Messages. Reference number: [id number].   4/04/2024 12:36:33 AM CDT

## 2024-04-05 NOTE — TELEPHONE ENCOUNTER
Refill Routing Note   Medication(s) are not appropriate for processing by [facility] for the following reason(s):        No active prescription written by provider    [facility] action(s):  Defer     Requires labs : Yes             Appointments  past 12m or future 3m with PCP    Date Provider   Last Visit   11/16/2023 Renata Gomez MD   Next Visit   Visit date not found Renata Gomez MD   ED visits in past 90 days: 0        Note composed:11:05 PM 04/04/2024

## 2024-04-08 RX ORDER — LOSARTAN POTASSIUM AND HYDROCHLOROTHIAZIDE 12.5; 5 MG/1; MG/1
1 TABLET ORAL DAILY
Qty: 90 TABLET | Refills: 2 | Status: SHIPPED | OUTPATIENT
Start: 2024-04-08

## 2024-04-18 NOTE — PROGRESS NOTES
Subjective:       Patient ID: Carolee Bartlett is a 69 y.o. female.    Chief Complaint: Glaucoma    HPI    DLS: 3/6/24 w/ Dr. Macias    69 y.o. female presents for IOP Check. Patient denies changes to vision   but wants updated specs. Denies redness, pain, and floaters. States she   sometimes see flashes OU but believes it is due to her blood pressure.   Complains of daily headaches when reading. Has dry eyes, some relief with   AT's     1. Severe MMG OU  2. KCS/PRASHANTH  3. Hx Lupus  4. PCIOLOU  5. Graves Dz  6. Ptosis  7. Hemifacial Spasms / Synkinesis    MEDS:  PF Dorzolamide Timolol BID OU  Iyuzeh qhs OU  Systane AT's PRN OU  Last edited by Gem Oropeza on 4/19/2024 11:26 AM.               Assessment & Plan   Severe stage secondary glaucoma of both eyes due to combination mechanisms  -     latanoprost, PF, (IYUZEH) 0.005 % Dpet; Place 1 drop into both eyes every evening.  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    Dry eye syndrome of both lacrimal glands    Pseudophakia, both eyes      MMG severe OU   Was CACG now post CE  -Fhx (+Mother, +Brother), Steroids (),Trauma()  -Drops: PF-Latanoprost (iyuzeh) qHS OU (started 2/21/22) // PF-dorzolamide-timolol BID OU  -Drop intolerance/contraindication: rocklatan - very red // preservatives make K very dry   -Laser:-  -Surgeries: CE IOL OU  -CCT: 605 // 640 thick  -Gonio: SS with pigment  IOP max: 21//23  IOP goal: 12    6/23 HVF 24-2 SAD/IAD central island VFI 43% OD // SALT/ IAD VFI 28% OS with high FN OU -->++Prog OU --> HVF and OCT do not match   6/23 RNFL Dec TI OD // Dec G/TS/TI B T OS stable OU     12/23 HVF 24-2 SAD/IAD VFI 89% OD // SAD/IAD VFI 79% OS --> improved!    Discussed severe stage glaucoma -- will try to meet target IOP 12   Discussed SLT v change gtt (cosopt v rocklatan) --> pt prefers rocklatan --> v red and insurance issues --> trying PF-latan and  PF-cosopt    Preservative holiday  Today IMPROVED K !    Will Rx Iyuzeh   She is getting PF-cosopt free from insurance (medicaid/medicare/[employer])      Iyuza qHS PM OU  Cosopt-PF BID OU    PRASHANTH OU  Hx Lupus  Started Xiidra 2/21/22 --> failed --> try restasis (sent 9/1/23) --> Improved!! And covered by insurance --> continue   Pres free AT's about 6 x day   Gel drops or ointment at night   Consider punctal plugs / doxy PRN  Anti-SSA / SSB negative  Will try PF-glc gtts as above    Pseudophakia OU  Lenses WC  OD DIBOO  +21.00 target -0.13   OS DIBOO +21.50 target -0.05   VA great      Graves disease  No overt E/o ELMER on exam   Monitor     Eyelid ptosis  Hemifacial spasm / synkinesis  Dr. Burden    PLAN  Cont   Iyuza qHS PM OU  Cosopt-PF BID OU    Cont restasis     Dr. Martinez for bifocal management- she can't read well with them       RTC 3 months Dr. Nieves with IOP check and est care           Soniya Macias M.D., M.S.  Department of Ophthalmology   Division of Glaucoma Surgery  [facility]

## 2024-04-19 ENCOUNTER — OFFICE VISIT (OUTPATIENT)
Dept: OPHTHALMOLOGY | Facility: CLINIC | Age: 69
End: 2024-04-19
Payer: MEDICARE

## 2024-04-19 DIAGNOSIS — G51.31 HEMIFACIAL SPASM OF RIGHT SIDE OF FACE: ICD-10-CM

## 2024-04-19 DIAGNOSIS — M35.01 KERATOCONJUNCTIVITIS SICCA OF BOTH EYES: ICD-10-CM

## 2024-04-19 DIAGNOSIS — H40.53X3 SEVERE STAGE SECONDARY GLAUCOMA OF BOTH EYES DUE TO COMBINATION MECHANISMS: Primary | ICD-10-CM

## 2024-04-19 DIAGNOSIS — Z96.1 PSEUDOPHAKIA, BOTH EYES: ICD-10-CM

## 2024-04-19 DIAGNOSIS — H02.403 PTOSIS, BILATERAL: ICD-10-CM

## 2024-04-19 DIAGNOSIS — H04.123 DRY EYE SYNDROME OF BOTH LACRIMAL GLANDS: ICD-10-CM

## 2024-04-19 PROCEDURE — 1160F RVW MEDS BY RX/DR IN RCRD: CPT | Mod: CPTII,S$GLB,, | Performed by: STUDENT IN AN ORGANIZED HEALTH CARE EDUCATION/TRAINING PROGRAM

## 2024-04-19 PROCEDURE — 1126F AMNT PAIN NOTED NONE PRSNT: CPT | Mod: CPTII,S$GLB,, | Performed by: STUDENT IN AN ORGANIZED HEALTH CARE EDUCATION/TRAINING PROGRAM

## 2024-04-19 PROCEDURE — 99214 OFFICE O/P EST MOD 30 MIN: CPT | Mod: S$GLB,,, | Performed by: STUDENT IN AN ORGANIZED HEALTH CARE EDUCATION/TRAINING PROGRAM

## 2024-04-19 PROCEDURE — 99999 PR PBB SHADOW E&M-EST. PATIENT-LVL II: CPT | Mod: PBBFAC,,, | Performed by: STUDENT IN AN ORGANIZED HEALTH CARE EDUCATION/TRAINING PROGRAM

## 2024-04-19 PROCEDURE — 3288F FALL RISK ASSESSMENT DOCD: CPT | Mod: CPTII,S$GLB,, | Performed by: STUDENT IN AN ORGANIZED HEALTH CARE EDUCATION/TRAINING PROGRAM

## 2024-04-19 PROCEDURE — 1101F PT FALLS ASSESS-DOCD LE1/YR: CPT | Mod: CPTII,S$GLB,, | Performed by: STUDENT IN AN ORGANIZED HEALTH CARE EDUCATION/TRAINING PROGRAM

## 2024-04-19 PROCEDURE — 1159F MED LIST DOCD IN RCRD: CPT | Mod: CPTII,S$GLB,, | Performed by: STUDENT IN AN ORGANIZED HEALTH CARE EDUCATION/TRAINING PROGRAM

## 2024-04-19 RX ORDER — LATANOPROST OPHTHALMIC SOLUTION 0.005% 50 UG/ML
1 SOLUTION/ DROPS TOPICAL NIGHTLY
Qty: 30 EACH | Refills: 6 | Status: SHIPPED | OUTPATIENT
Start: 2024-04-19 | End: 2025-04-19

## 2024-04-24 ENCOUNTER — TELEPHONE (OUTPATIENT)
Dept: OPHTHALMOLOGY | Facility: CLINIC | Age: 69
End: 2024-04-24
Payer: MEDICARE

## 2024-04-24 NOTE — TELEPHONE ENCOUNTER
----- Message from Brenda Vanessa sent at 4/24/2024  9:08 AM CDT -----  Contact: [phone]  Pt is calling regarding refill [facility] it do not have the instruction to why medication is needed. Please contact the insurance company

## 2024-05-15 ENCOUNTER — LAB VISIT (OUTPATIENT)
Dept: LAB | Facility: HOSPITAL | Age: 69
End: 2024-05-15
Attending: INTERNAL MEDICINE
Payer: MEDICARE

## 2024-05-15 DIAGNOSIS — E11.9 CONTROLLED TYPE 2 DIABETES MELLITUS WITHOUT COMPLICATION, WITHOUT LONG-TERM CURRENT USE OF INSULIN: ICD-10-CM

## 2024-05-15 LAB
ESTIMATED AVG GLUCOSE: 140 MG/DL (ref 68–131)
HBA1C MFR BLD: 6.5 % (ref 4–5.6)

## 2024-05-15 PROCEDURE — 83036 HEMOGLOBIN GLYCOSYLATED A1C: CPT | Performed by: INTERNAL MEDICINE

## 2024-05-15 PROCEDURE — 36415 COLL VENOUS BLD VENIPUNCTURE: CPT | Performed by: INTERNAL MEDICINE

## 2024-05-20 ENCOUNTER — TELEPHONE (OUTPATIENT)
Dept: PRIMARY CARE CLINIC | Facility: CLINIC | Age: 69
End: 2024-05-20
Payer: MEDICARE

## 2024-05-20 ENCOUNTER — HOSPITAL ENCOUNTER (EMERGENCY)
Facility: HOSPITAL | Age: 69
Discharge: HOME | End: 2024-05-20
Attending: INTERNAL MEDICINE
Payer: MEDICARE

## 2024-05-20 VITALS
HEIGHT: 63 IN | BODY MASS INDEX: 35.44 KG/M2 | TEMPERATURE: 98 F | WEIGHT: 200 LBS | HEART RATE: 66 BPM | OXYGEN SATURATION: 100 % | DIASTOLIC BLOOD PRESSURE: 60 MMHG | SYSTOLIC BLOOD PRESSURE: 128 MMHG | RESPIRATION RATE: 17 BRPM

## 2024-05-20 DIAGNOSIS — M79.604 RIGHT LEG PAIN: ICD-10-CM

## 2024-05-20 LAB
ALBUMIN SERPL-MCNC: 3.5 G/DL (ref 3.3–5.5)
ALP SERPL-CCNC: 135 U/L (ref 42–141)
BILIRUB SERPL-MCNC: 0.5 MG/DL (ref 0.2–1.6)
BUN SERPL-MCNC: 11 MG/DL (ref 7–22)
CALCIUM SERPL-MCNC: 10 MG/DL (ref 8–10.3)
CHLORIDE SERPL-SCNC: 109 MMOL/L (ref 98–108)
CREAT SERPL-MCNC: 0.9 MG/DL (ref 0.6–1.2)
GLUCOSE SERPL-MCNC: 176 MG/DL (ref 73–118)
HCT, POC: NORMAL
HGB, POC: NORMAL (ref 14–18)
MCH, POC: NORMAL
MCHC, POC: NORMAL
MCV, POC: NORMAL
MPV, POC: NORMAL
POC ALT (SGPT): 22 U/L (ref 10–47)
POC AST (SGOT): 19 U/L (ref 11–38)
POC PLATELET COUNT: NORMAL
POC TCO2: 28 MMOL/L (ref 18–33)
POCT GLUCOSE: 185 MG/DL (ref 70–110)
POTASSIUM BLD-SCNC: 4.1 MMOL/L (ref 3.6–5.1)
PROTEIN, POC: 7.6 G/DL (ref 6.4–8.1)
RBC, POC: NORMAL
RDW, POC: NORMAL
SODIUM BLD-SCNC: 143 MMOL/L (ref 128–145)
WBC, POC: NORMAL

## 2024-05-20 PROCEDURE — 63600175 PHARM REV CODE 636 W HCPCS: Mod: ER | Performed by: INTERNAL MEDICINE

## 2024-05-20 PROCEDURE — 96374 THER/PROPH/DIAG INJ IV PUSH: CPT | Mod: ER

## 2024-05-20 PROCEDURE — 99285 EMERGENCY DEPT VISIT HI MDM: CPT | Mod: 25,ER

## 2024-05-20 PROCEDURE — 85025 COMPLETE CBC W/AUTO DIFF WBC: CPT | Mod: ER

## 2024-05-20 PROCEDURE — 82962 GLUCOSE BLOOD TEST: CPT | Mod: ER

## 2024-05-20 PROCEDURE — 80053 COMPREHEN METABOLIC PANEL: CPT | Mod: ER

## 2024-05-20 PROCEDURE — 25000003 PHARM REV CODE 250: Mod: ER | Performed by: NURSE PRACTITIONER

## 2024-05-20 RX ORDER — NAPROXEN 500 MG/1
500 TABLET ORAL 2 TIMES DAILY WITH MEALS
Qty: 14 TABLET | Refills: 0 | Status: SHIPPED | OUTPATIENT
Start: 2024-05-20

## 2024-05-20 RX ORDER — ONDANSETRON HYDROCHLORIDE 2 MG/ML
4 INJECTION, SOLUTION INTRAVENOUS
Status: DISCONTINUED | OUTPATIENT
Start: 2024-05-20 | End: 2024-05-20

## 2024-05-20 RX ORDER — HYDROMORPHONE HYDROCHLORIDE 2 MG/ML
0.5 INJECTION, SOLUTION INTRAMUSCULAR; INTRAVENOUS; SUBCUTANEOUS
Status: DISCONTINUED | OUTPATIENT
Start: 2024-05-20 | End: 2024-05-20

## 2024-05-20 RX ORDER — LIDOCAINE 50 MG/G
1 PATCH TOPICAL
Status: DISCONTINUED | OUTPATIENT
Start: 2024-05-20 | End: 2024-05-20 | Stop reason: HOSPADM

## 2024-05-20 RX ORDER — KETOROLAC TROMETHAMINE 30 MG/ML
15 INJECTION, SOLUTION INTRAMUSCULAR; INTRAVENOUS
Status: COMPLETED | OUTPATIENT
Start: 2024-05-20 | End: 2024-05-20

## 2024-05-20 RX ADMIN — LIDOCAINE 1 PATCH: 50 PATCH TOPICAL at 08:05

## 2024-05-20 RX ADMIN — KETOROLAC TROMETHAMINE 15 MG: 30 INJECTION, SOLUTION INTRAMUSCULAR at 07:05

## 2024-05-20 NOTE — TELEPHONE ENCOUNTER
----- Message from Siasandy Jain sent at 5/20/2024  3:49 PM CDT -----  Contact: [phone]  Symptom: Leg Swelling - Not From Injury  Outcome: Schedule an appointment to be seen within 24 hours.  Reason: Caller denied all higher acuity questions  Patient stated that she is having hard time walking with her right calf, has it been for a week, unable to find an open appointment, please advise. Thank you.

## 2024-05-20 NOTE — ED NOTES
Bed: EXAM 03  Expected date:   Expected time:   Means of arrival: Personal Transportation  Comments:

## 2024-05-21 ENCOUNTER — PATIENT OUTREACH (OUTPATIENT)
Dept: EMERGENCY MEDICINE | Facility: HOSPITAL | Age: 69
End: 2024-05-21
Payer: MEDICARE

## 2024-05-21 ENCOUNTER — PATIENT MESSAGE (OUTPATIENT)
Dept: PRIMARY CARE CLINIC | Facility: CLINIC | Age: 69
End: 2024-05-21
Payer: MEDICARE

## 2024-05-21 NOTE — DISCHARGE INSTRUCTIONS
You may use Lidocaine patches over the counter as directed on package.  Do not combine this product with any other therapies or products except as directed.      You have been prescribed naprosyn (naproxen sodium), an anti-inflammatory.  Take this medication whether you feel you need it or not.  Do not take ibuprofen, naproxen or other NSAID's medications while taking this medication.     Return to the Emergency Department for any worsening, change in condition, or any emergent concerns.

## 2024-05-21 NOTE — PROGRESS NOTES
Patient was seen in the ED on 5/20/24. Phoned patient to assist with Post ED Discharge Navigation. Patient agreed to assistance scheduling a PCP follow-up appointment. In Basket message sent to PCP staff for scheduling assistance. Appointment scheduled. Appointment reminder set.  Penelope Perera

## 2024-05-21 NOTE — ED PROVIDER NOTES
Encounter Date: 5/20/2024       History     Chief Complaint   Patient presents with    Leg Pain     Pt c/o right leg pain with tenderness that started 2/3 days ago but today it feels worse.      Chief complaint: Right calf pain    History of present illness: Patient is a 69-year-old female who presents the emergency department with right calf pain.  She reports the pain is aching it is tender to the touch.  It has been present for 2-3 days.  She denies any falls injuries, long drives, history of blood clots, history of cancer, use of hormone related medications.  She is currently a smoker.  She has taken no medications or treatments for this issue.    The history is provided by the patient. No  was used.     Review of patient's allergies indicates:  No Known Allergies  Past Medical History:   Diagnosis Date    Avascular necrosis     Diabetes mellitus     Encounter for blood transfusion     Glaucoma     Graves disease     Hypertension     Other abnormal glucose     Pre-Diabetic    Sciatica      Past Surgical History:   Procedure Laterality Date    CATARACT EXTRACTION W/  INTRAOCULAR LENS IMPLANT Right 01/26/2023        CATARACT EXTRACTION W/  INTRAOCULAR LENS IMPLANT Left 09/08/2022        COLONOSCOPY N/A 03/27/2023    Procedure: COLONOSCOPY;  Surgeon: Yohannes Francois MD;  Location: [facility] ENDOSCOPY;  Service: Endoscopy;  Laterality: N/A;  instr via portal; AP  3/24 pre call complete, pt stated she would have a ride -CE    HIP ARTHROPLASTY Right 11/26/2018    Procedure: ARTHROPLASTY, HIP, Raciel, peg board, first assist;  Surgeon: Olayinka Redding MD;  Location: [facility] OR;  Service: Orthopedics;  Laterality: Right;  RACIEL ORTHO CONFIRMED 11/20/DME FIRST ASSISTANT CONFIRMED 11/21/DME    HIP REPLACEMENT ARTHROPLASTY Right     INTRAOCULAR PROSTHESES INSERTION Left 09/08/2022    Procedure: INSERTION, IOL PROSTHESIS;  Surgeon: Soniya Macias MD;  [address];   Service: Ophthalmology;  Laterality: Left;    INTRAOCULAR PROSTHESES INSERTION Right 01/26/2023    Procedure: INSERTION, IOL PROSTHESIS;  Surgeon: Soniya Macias MD;  Location: [facility] OR [address] [place] [address];  Service: Ophthalmology;  Laterality: Right;    PHACOEMULSIFICATION OF CATARACT Left 09/08/2022    Procedure: PHACOEMULSIFICATION, CATARACT;  Surgeon: Soniya Macias MD;  Location: [facility] OR [address] [place] [address];  Service: Ophthalmology;  Laterality: Left;    PHACOEMULSIFICATION OF CATARACT Right 01/26/2023    Procedure: PHACOEMULSIFICATION, CATARACT;  Surgeon: Soniya Macias MD;  Location: [facility] OR [address] [place] [address];  Service: Ophthalmology;  Laterality: Right;    TUBAL LIGATION       Family History   Problem Relation Name Age of Onset    Kidney failure Mother      Hypertension Mother          was on list to have kidney, lung, and heart transplant    Glaucoma Mother      Blindness Mother      Diabetes Mother      Stomach cancer Father      Breast cancer Sister  33        double mastectomy    Diabetes Sister      Heart attack Brother  40    Glaucoma Brother      Amblyopia Neg Hx      Cataracts Neg Hx      Macular degeneration Neg Hx      Retinal detachment Neg Hx      Strabismus Neg Hx       Social History     Tobacco Use    Smoking status: Every Day     Current packs/day: 0.25     Average packs/day: 0.3 packs/day for 40.0 years (10.0 ttl pk-yrs)     Types: Cigarettes    Smokeless tobacco: Never   Substance Use Topics    Alcohol use: No    Drug use: No     Review of Systems   Constitutional:  Negative for chills, fatigue and fever.   HENT:  Negative for congestion, ear discharge, ear pain, postnasal drip, rhinorrhea, sinus pressure, sneezing, sore throat and voice change.    Eyes:  Negative for discharge and itching.   Respiratory:  Negative for cough, shortness of breath and wheezing.    Cardiovascular:  Negative for chest pain, palpitations and leg swelling.   Gastrointestinal:  Negative for abdominal pain, constipation, diarrhea, nausea and  vomiting.   Endocrine: Negative for polydipsia, polyphagia and polyuria.   Genitourinary:  Negative for dysuria, frequency, hematuria, urgency, vaginal bleeding, vaginal discharge and vaginal pain.   Musculoskeletal:  Positive for myalgias. Negative for arthralgias.   Skin:  Negative for rash and wound.   Neurological:  Negative for dizziness, seizures, syncope, weakness and numbness.   Hematological:  Negative for adenopathy. Does not bruise/bleed easily.   Psychiatric/Behavioral:  Negative for self-injury and suicidal ideas. The patient is not nervous/anxious.        Physical Exam     Initial Vitals   BP Pulse Resp Temp SpO2   05/20/24 1845 05/20/24 1844 05/20/24 1844 05/20/24 1844 05/20/24 1844   (!) 178/84 85 17 98.2 °F (36.8 °C) 100 %      MAP       --                Physical Exam    Nursing note and vitals reviewed.  Constitutional: She appears well-developed and well-nourished.   HENT:   Head: Normocephalic and atraumatic.   Right Ear: External ear normal.   Left Ear: External ear normal.   Nose: Nose normal.   Eyes: Conjunctivae and EOM are normal. Pupils are equal, round, and reactive to light. Right eye exhibits no discharge. Left eye exhibits no discharge.   Neck:   Normal range of motion.  Abdominal: She exhibits no distension.   Musculoskeletal:         General: Normal range of motion.      Cervical back: Normal range of motion.      Comments:   Right calf is tender to the touch but is not reddened or warm.  Distal P SM is intact.     Neurological: She is alert and oriented to person, place, and time.   Skin: Skin is dry. Capillary refill takes less than 2 seconds.         ED Course   Procedures  Labs Reviewed   POCT GLUCOSE - Abnormal; Notable for the following components:       Result Value    POCT Glucose 185 (*)     All other components within normal limits   POCT CMP - Abnormal; Notable for the following components:    POC Chloride 109 (*)     POC Glucose 176 (*)     All other components within  normal limits   POCT CBC   POCT CMP          Imaging Results              US Lower Extremity Veins Right (Final result)  Result time 05/20/24 20:14:44      Final result by Bill Torres MD (05/20/24 20:14:44)                   Impression:      No evidence of deep venous thrombosis in the right lower extremity.      Electronically signed by: Bill Torres MD  Date:    05/20/2024  Time:    20:14               Narrative:    EXAMINATION:  US LOWER EXTREMITY VEINS RIGHT    CLINICAL HISTORY:  Pain in right leg    TECHNIQUE:  Duplex and color flow Doppler evaluation and graded compression of the right lower extremity veins was performed.    COMPARISON:  Bilateral lower extremity venous upper ultrasound 03/09/2019    FINDINGS:  Right thigh veins: The common femoral, femoral, popliteal, upper greater saphenous, and deep femoral veins are patent and free of thrombus. The veins are normally compressible and have normal phasic flow and augmentation response.    Right calf veins: The visualized calf veins are patent.    Contralateral CFV: The contralateral (left) common femoral vein is patent and free of thrombus.    Miscellaneous: None                                       Medications   LIDOcaine 5 % patch 1 patch (1 patch Transdermal Patch Applied 5/20/24 2048)   ketorolac injection 15 mg (15 mg Intravenous Given 5/20/24 1922)     Medical Decision Making  Patient is a 69-year-old female who presents the emergency department with right calf pain.  She reports the pain is aching it is tender to the touch.  It has been present for 2-3 days.  She denies any falls injuries, long drives, history of blood clots, history of cancer, use of hormone related medications.  She is currently a smoker.  She has taken no medications or treatments for this issue.    Right calf is tender to the touch but is not reddened or warm.  Distal P SM is intact.      Differential diagnosis includes DVT, musculoskeletal injury, cellulitis    Problems  "Addressed:  Right leg pain: acute illness or injury     Details:   Ultrasound negative for DVT.  Given the pain is in the muscle body of the calf I feel that it is unlikely that this represents fracture or dislocation.  This is much more likely to be a muscle strain or spasm.  Naprosyn and lidocaine patches prescribed.    Amount and/or Complexity of Data Reviewed  Labs: ordered. Decision-making details documented in ED Course.  Radiology:  Decision-making details documented in ED Course.  Discussion of management or test interpretation with external provider(s): Vital signs at the time of disposition were:  /60   Pulse 66   Temp 98.2 °F (36.8 °C) (Oral)   Resp 17   Ht 5' 3" (1.6 m)   Wt 90.7 kg (200 lb)   SpO2 100%   BMI 35.43 kg/m²       See AVS for additional recommendations. Medications listed herein were prescribed after reviewing the patient's allergies, medication list, history, most recent laboratories as available.  Referrals below were provided after reviewing the patient's previous medical providers. She understands she  should return for any worsening or changes in condition.  Prior to discharge the patient was asked if she  had any additional concerns or complaints and she declined. The patient was given an opportunity to ask questions and all were answered to her satisfaction.     Risk  Prescription drug management.  Diagnosis or treatment significantly limited by social determinants of health.               ED Course as of 05/20/24 2131   Mon May 20, 2024   1915 POCT Glucose(!): 185 [VC]   1915 BP(!): 178/84 [VC]   1915 Temp: 98.2 °F (36.8 °C) [VC]   1915 Temp Source: Oral [VC]   1915 Pulse: 85 [VC]   1915 Resp: 17 [VC]   1915 SpO2: 100 % [VC]   1925 POCT CBC  Mild anemia, normal cbc otherwise. [VC]   1945 POCT CMP(!)  Normal cmp essentially. [VC]   2036 US Lower Extremity Veins Right       No evidence of deep venous thrombosis in the right lower extremity.   [VC]   2036 BP(!): 142/90 [VC] "   2036 Pulse: 64 [VC]   2036 SpO2: 100 % [VC]      ED Course User Index  [VC] Sacha Pathak DNP                           Clinical Impression:  Final diagnoses:  [M79.604] Right leg pain          ED Disposition Condition    Discharge Stable          ED Prescriptions       Medication Sig Dispense Start Date End Date Auth. Provider    naproxen (NAPROSYN) 500 MG tablet Take 1 tablet (500 mg total) by mouth 2 (two) times daily with meals. 14 tablet 5/20/2024 -- Sacha Pathak DNP          Follow-up Information       Follow up With Specialties Details Why Contact Info    Renata Gomez MD Internal Medicine Schedule an appointment as soon as possible for a visit   [address]  [address] , [address]  [facility] [address]  [phone]               Sacha Pathak DNP  05/20/24 6606

## 2024-05-23 ENCOUNTER — PATIENT OUTREACH (OUTPATIENT)
Dept: EMERGENCY MEDICINE | Facility: HOSPITAL | Age: 69
End: 2024-05-23
Payer: MEDICARE

## 2024-05-23 NOTE — PROGRESS NOTES
Phoned patient for follow-up. Patient doing well. Patient has an upcoming PCP appointment. Patient declined the need for additional assistance at this time.  Penelope Perera

## 2024-05-23 NOTE — PROGRESS NOTES
Reminded patient of upcoming appointment on 5/24/24 at 9:20 with Emily Hernández, NP  [facility], Primary [facility] [address] [facility] [place] [address] [phone].  Penelope Perera

## 2024-05-24 ENCOUNTER — OFFICE VISIT (OUTPATIENT)
Dept: PRIMARY CARE CLINIC | Facility: CLINIC | Age: 69
End: 2024-05-24
Payer: MEDICARE

## 2024-05-24 VITALS
DIASTOLIC BLOOD PRESSURE: 74 MMHG | HEART RATE: 71 BPM | HEIGHT: 63 IN | OXYGEN SATURATION: 99 % | WEIGHT: 215.81 LBS | SYSTOLIC BLOOD PRESSURE: 136 MMHG | BODY MASS INDEX: 38.24 KG/M2

## 2024-05-24 DIAGNOSIS — E11.65 TYPE 2 DIABETES MELLITUS WITH HYPERGLYCEMIA, WITHOUT LONG-TERM CURRENT USE OF INSULIN: ICD-10-CM

## 2024-05-24 DIAGNOSIS — R22.41 LOCALIZED SWELLING, MASS, OR LUMP OF RIGHT LOWER EXTREMITY: Primary | ICD-10-CM

## 2024-05-24 DIAGNOSIS — M87.051 AVASCULAR NECROSIS OF RIGHT FEMUR: ICD-10-CM

## 2024-05-24 DIAGNOSIS — J84.10 CALCIFIED GRANULOMA OF LUNG: ICD-10-CM

## 2024-05-24 DIAGNOSIS — I10 HYPERTENSION, UNSPECIFIED TYPE: ICD-10-CM

## 2024-05-24 DIAGNOSIS — E66.01 MORBID (SEVERE) OBESITY DUE TO EXCESS CALORIES: ICD-10-CM

## 2024-05-24 PROCEDURE — 1159F MED LIST DOCD IN RCRD: CPT | Mod: CPTII,S$GLB,, | Performed by: NURSE PRACTITIONER

## 2024-05-24 PROCEDURE — 99214 OFFICE O/P EST MOD 30 MIN: CPT | Mod: S$GLB,,, | Performed by: NURSE PRACTITIONER

## 2024-05-24 PROCEDURE — 3078F DIAST BP <80 MM HG: CPT | Mod: CPTII,S$GLB,, | Performed by: NURSE PRACTITIONER

## 2024-05-24 PROCEDURE — 3008F BODY MASS INDEX DOCD: CPT | Mod: CPTII,S$GLB,, | Performed by: NURSE PRACTITIONER

## 2024-05-24 PROCEDURE — 3075F SYST BP GE 130 - 139MM HG: CPT | Mod: CPTII,S$GLB,, | Performed by: NURSE PRACTITIONER

## 2024-05-24 PROCEDURE — 3044F HG A1C LEVEL LT 7.0%: CPT | Mod: CPTII,S$GLB,, | Performed by: NURSE PRACTITIONER

## 2024-05-24 PROCEDURE — 99999 PR PBB SHADOW E&M-EST. PATIENT-LVL III: CPT | Mod: PBBFAC,,, | Performed by: NURSE PRACTITIONER

## 2024-05-24 NOTE — PROGRESS NOTES
[facility] Note    Chief Complaint      Chief Complaint   Patient presents with    Hospital Follow Up     Leg pain, possible muscle strain        History of Present Illness      Carolee Bartlett is a 69 y.o. female with chronic conditions of avascular necrosis, DM2, glaucoma, graves disease, htn, sciatica who presents today for: ER follow up for right calf pain. U/s r/o DVT. Diagnosed with muscle strain/spasm. Given Naprosyn and lidocaine patches. minimal improvement.   Pt swelling has improved, but feel like still has pain in right calk/ankle and noticed a lump to right outer calf. Worse when walking. Little better at rest. Sensitive to touch. No rash. No recent travel, no trauma/fall. Cbc, cmp unremarkable.     Htn: bp at goal. On lisinopril-hctz.   Dm2: on metformin, last A1C 6.5%. has gotten some higher readings lately, enrolled in digital medicine program. Has been trying to change diet- more vegetables, and less carbs.     Past Medical History:  Past Medical History:   Diagnosis Date    Avascular necrosis     Diabetes mellitus     Encounter for blood transfusion     Glaucoma     Graves disease     Hypertension     Other abnormal glucose     Pre-Diabetic    Sciatica        Past Surgical History:   has a past surgical history that includes Tubal ligation; Hip Arthroplasty (Right, 11/26/2018); Phacoemulsification of cataract (Left, 09/08/2022); Intraocular prosthesis insertion (Left, 09/08/2022); Hip replacement arthroplasty (Right); Phacoemulsification of cataract (Right, 01/26/2023); Intraocular prosthesis insertion (Right, 01/26/2023); Colonoscopy (N/A, 03/27/2023); Cataract extraction w/  intraocular lens implant (Right, 01/26/2023); and Cataract extraction w/  intraocular lens implant (Left, 09/08/2022).    Family History:  family history includes Blindness in her mother; Breast cancer (age of onset: 33) in her sister; Diabetes in her mother and sister; Glaucoma in her brother and mother;  Heart attack (age of onset: 40) in her brother; Hypertension in her mother; Kidney failure in her mother; Stomach cancer in her father.     Social History:  Social History     Tobacco Use    Smoking status: Every Day     Current packs/day: 0.25     Average packs/day: 0.3 packs/day for 40.0 years (10.0 ttl pk-yrs)     Types: Cigarettes    Smokeless tobacco: Never   Substance Use Topics    Alcohol use: No    Drug use: No       Review of Systems   Constitutional:  Negative for chills and fever.   Respiratory:  Negative for cough and shortness of breath.    Cardiovascular:  Negative for chest pain and palpitations.   Gastrointestinal:  Negative for constipation, diarrhea, nausea and vomiting.   Genitourinary:  Negative for dysuria and hematuria.   Musculoskeletal:  Positive for joint pain (right leg pain). Negative for falls.   Neurological:  Negative for headaches.        Medications:  Outpatient Encounter Medications as of 5/24/2024   Medication Sig Dispense Refill    atorvastatin (LIPITOR) 10 MG tablet Take 2 tablets (20 mg total) by mouth once daily. 180 tablet 0    cycloSPORINE (RESTASIS) 0.05 % ophthalmic emulsion Place 1 drop into both eyes 2 (two) times daily. 60 each 12    dorzolamide-timolol, PF, (COSOPT PF) 2-0.5 % Dpet ophthalmic solution Place 1 drop into both eyes 2 (two) times daily. 60 each 6    latanoprost, PF, (IYUZEH) 0.005 % Dpet Place 1 drop into both eyes every evening. 30 each 6    levothyroxine (SYNTHROID) 100 MCG tablet TAKE 1 TABLET BY MOUTH BEFORE BREAKFAST. 90 tablet 3    losartan-hydrochlorothiazide 50-12.5 mg (HYZAAR) 50-12.5 mg per tablet Take 1 tablet by mouth once daily. 90 tablet 2    metFORMIN (GLUCOPHAGE) 1000 MG tablet Take 0.5 tablets (500 mg total) by mouth daily with breakfast. 90 tablet 3    naproxen (NAPROSYN) 500 MG tablet Take 1 tablet (500 mg total) by mouth 2 (two) times daily with meals. 14 tablet 0    [DISCONTINUED] ibuprofen (ADVIL,MOTRIN) 600 MG tablet Take 1 tablet (600  "mg total) by mouth every 8 (eight) hours as needed for Pain. 30 tablet 0    [DISCONTINUED] penicillin v potassium (VEETID) 500 MG tablet TAKE 1 TABLET BY MOUTH 3 TIMES A DAY UNTIL GONE       No facility-administered encounter medications on file as of 5/24/2024.       Allergies:  Review of patient's allergies indicates:  No Known Allergies    Health Maintenance:  Immunization History   Administered Date(s) Administered    COVID-19, MRNA, LN-S, PF (Pfizer) (Purple Cap) 02/25/2021, 03/18/2021, 10/26/2021    Influenza (FLUAD) - Quadrivalent - Adjuvanted - PF *Preferred* (65+) 11/16/2023      Health Maintenance   Topic Date Due    Foot Exam  Never done    TETANUS VACCINE  Never done    Shingles Vaccine (1 of 2) Never done    DEXA Scan  09/23/2024    Hemoglobin A1c  11/15/2024    Eye Exam  12/13/2024    Mammogram  01/16/2025    Lipid Panel  03/06/2025    Low Dose Statin  05/24/2025    Colorectal Cancer Screening  03/27/2028    Hepatitis C Screening  Completed        Physical Exam      Vital Signs  Pulse: 71  SpO2: 99 %  BP: 136/74  BP Location: Right arm  Patient Position: Sitting  Height and Weight  Height: 5' 3" (160 cm)  Weight: 97.9 kg (215 lb 13.3 oz)  BSA (Calculated - sq m): 2.09 sq meters  BMI (Calculated): 38.2  Weight in (lb) to have BMI = 25: 140.8]    Physical Exam  Constitutional:       Appearance: She is well-developed.   HENT:      Head: Normocephalic and atraumatic.   Cardiovascular:      Rate and Rhythm: Normal rate and regular rhythm.      Pulses: Normal pulses.      Heart sounds: Normal heart sounds. No murmur heard.  Pulmonary:      Effort: Pulmonary effort is normal. No respiratory distress.      Breath sounds: Normal breath sounds.   Abdominal:      General: There is no distension.      Palpations: Abdomen is soft.      Tenderness: There is no abdominal tenderness. There is no guarding.   Musculoskeletal:         General: Tenderness (ttp to right lateral calf above ankle) present. No swelling. " Normal range of motion.   Skin:     General: Skin is warm and dry.      Capillary Refill: Capillary refill takes less than 2 seconds.      Findings: No erythema or rash.   Neurological:      Mental Status: She is alert. Mental status is at baseline.   Psychiatric:         Behavior: Behavior normal.          Laboratory:  CBC:  Recent Labs   Lab 12/27/21  0837 10/20/22  0837 11/16/23  1010   WBC 7.62 9.50 7.40   RBC 4.88 5.05 4.83   Hemoglobin 12.1 12.8 12.1   Hematocrit 40.4 43.0 40.2   Platelets 415 377 404   MCV 83 85 83   MCH 24.8 L 25.3 L 25.1 L   MCHC 30.0 L 29.8 L 30.1 L     CMP:  Recent Labs   Lab 10/20/22  0837 11/16/23  1010 03/06/24  0915   Glucose 135 H 96 116 H   Calcium 10.0 9.8 9.9   Albumin 4.1 3.8 3.9   Total Protein 8.3 7.7 7.8   Sodium 138 144 140   Potassium 4.1 4.2 4.0   CO2 26 25 25   Chloride 104 110 108   BUN 14 15 11   Alkaline Phosphatase 143 H 142 H 133   ALT 26 20 19   AST 18 17 16   Total Bilirubin 0.6 0.4 0.3     URINALYSIS:       LIPIDS:  Recent Labs   Lab 12/05/22  0900 11/16/23  1010 03/06/24  0915   TSH 0.167 L 0.018 L 0.479   HDL 54 58 57   Cholesterol 222 H 175 167   Triglycerides 117 77 112   LDL Cholesterol 144.6 101.6 87.6   HDL/Cholesterol Ratio 24.3 33.1 34.1   Non-HDL Cholesterol 168 117 110   Total Cholesterol/HDL Ratio 4.1 3.0 2.9     TSH:  Recent Labs   Lab 12/05/22  0900 11/16/23  1010 03/06/24  0915   TSH 0.167 L 0.018 L 0.479     A1C:  Recent Labs   Lab 01/20/22  1352 04/20/22  1243 10/20/22  0837 06/07/23  1627 11/16/23  1010 05/15/24  0849   Hemoglobin A1C 7.1 H 6.8 H 6.7 H 6.9 H 6.6 H 6.5 H       Assessment/Plan     Carolee Bartlett is a 69 y.o.female with:    1. Localized swelling, mass, or lump of right lower extremity  - US Soft Tissue, Lower Extremity, Right; Future  - continue Naprosyn, lidocaine patches.     2. Hypertension, unspecified type  Stable. Continue current meds.      3. Type 2 diabetes mellitus with hyperglycemia, without long-term current use of  insulin   Stable. Continue current meds.  Continue lifestyle changes.     4. Morbid (severe) obesity due to excess calories  Stable. Discussed diet and lifestyle modifications    5. Calcified granuloma of lung  Stable. Last cxr 9/2023. Continue surveillance, and follow up with specialists.     6. Avascular necrosis of right femur  Resolved. S/p total right hip arthroplasty (2018)    Chronic conditions status updated as per HPI.  Other than changes above, cont current medications and maintain follow up with specialists.  No follow-ups on file.    Future Appointments   Date Time Provider Department Center   7/15/2024  2:20 PM Renata Gomez MD [facility] PRICRE [place]   7/17/2024  9:20 AM Susie Nieves MD [facility] [address] Adrienne Cotaya, FNP [facility]

## 2024-05-27 ENCOUNTER — HOSPITAL ENCOUNTER (OUTPATIENT)
Dept: RADIOLOGY | Facility: HOSPITAL | Age: 69
Discharge: HOME | End: 2024-05-27
Attending: NURSE PRACTITIONER
Payer: MEDICARE

## 2024-05-27 DIAGNOSIS — R22.41 LOCALIZED SWELLING, MASS, OR LUMP OF RIGHT LOWER EXTREMITY: ICD-10-CM

## 2024-05-27 PROCEDURE — 76882 US LMTD JT/FCL EVL NVASC XTR: CPT | Mod: TC,RT

## 2024-05-27 PROCEDURE — 76882 US LMTD JT/FCL EVL NVASC XTR: CPT | Mod: 26,RT,, | Performed by: RADIOLOGY

## 2024-06-03 PROBLEM — E66.01 MORBID (SEVERE) OBESITY DUE TO EXCESS CALORIES: Status: ACTIVE | Noted: 2024-06-03

## 2024-06-03 PROBLEM — J84.10 CALCIFIED GRANULOMA OF LUNG: Status: ACTIVE | Noted: 2024-06-03

## 2024-06-03 PROBLEM — J98.4 CALCIFIED GRANULOMA OF LUNG: Status: ACTIVE | Noted: 2024-06-03

## 2024-07-15 ENCOUNTER — HOSPITAL ENCOUNTER (OUTPATIENT)
Dept: RADIOLOGY | Facility: HOSPITAL | Age: 69
Discharge: HOME | End: 2024-07-15
Attending: INTERNAL MEDICINE
Payer: MEDICARE

## 2024-07-15 ENCOUNTER — OFFICE VISIT (OUTPATIENT)
Dept: PRIMARY CARE CLINIC | Facility: CLINIC | Age: 69
End: 2024-07-15
Payer: MEDICARE

## 2024-07-15 VITALS
HEART RATE: 70 BPM | HEIGHT: 63 IN | OXYGEN SATURATION: 98 % | BODY MASS INDEX: 39.34 KG/M2 | WEIGHT: 222 LBS | DIASTOLIC BLOOD PRESSURE: 68 MMHG | SYSTOLIC BLOOD PRESSURE: 148 MMHG

## 2024-07-15 DIAGNOSIS — M25.569 KNEE PAIN, UNSPECIFIED CHRONICITY, UNSPECIFIED LATERALITY: ICD-10-CM

## 2024-07-15 DIAGNOSIS — M77.50 TENDONITIS OF ANKLE: ICD-10-CM

## 2024-07-15 DIAGNOSIS — M77.50 TENDONITIS OF ANKLE: Primary | ICD-10-CM

## 2024-07-15 DIAGNOSIS — L72.0 EPIDERMAL INCLUSION CYST: ICD-10-CM

## 2024-07-15 DIAGNOSIS — I70.0 AORTIC ATHEROSCLEROSIS: ICD-10-CM

## 2024-07-15 PROCEDURE — 3078F DIAST BP <80 MM HG: CPT | Mod: CPTII,S$GLB,, | Performed by: INTERNAL MEDICINE

## 2024-07-15 PROCEDURE — 73610 X-RAY EXAM OF ANKLE: CPT | Mod: TC,RT

## 2024-07-15 PROCEDURE — 99214 OFFICE O/P EST MOD 30 MIN: CPT | Mod: S$GLB,,, | Performed by: INTERNAL MEDICINE

## 2024-07-15 PROCEDURE — 73560 X-RAY EXAM OF KNEE 1 OR 2: CPT | Mod: 26,50,, | Performed by: RADIOLOGY

## 2024-07-15 PROCEDURE — 99999 PR PBB SHADOW E&M-EST. PATIENT-LVL III: CPT | Mod: PBBFAC,,, | Performed by: INTERNAL MEDICINE

## 2024-07-15 PROCEDURE — 73610 X-RAY EXAM OF ANKLE: CPT | Mod: 26,RT,, | Performed by: RADIOLOGY

## 2024-07-15 PROCEDURE — 3008F BODY MASS INDEX DOCD: CPT | Mod: CPTII,S$GLB,, | Performed by: INTERNAL MEDICINE

## 2024-07-15 PROCEDURE — 73560 X-RAY EXAM OF KNEE 1 OR 2: CPT | Mod: TC,50

## 2024-07-15 PROCEDURE — 3044F HG A1C LEVEL LT 7.0%: CPT | Mod: CPTII,S$GLB,, | Performed by: INTERNAL MEDICINE

## 2024-07-15 PROCEDURE — 3077F SYST BP >= 140 MM HG: CPT | Mod: CPTII,S$GLB,, | Performed by: INTERNAL MEDICINE

## 2024-07-15 RX ORDER — MELOXICAM 15 MG/1
15 TABLET ORAL DAILY
Qty: 14 TABLET | Refills: 0 | Status: SHIPPED | OUTPATIENT
Start: 2024-07-15

## 2024-07-15 NOTE — PROGRESS NOTES
[facility] Note    Chief Complaint      Chief Complaint   Patient presents with    Mass     Left side of neck/throat. Derm removed prior     Headache    Knee Pain     Bilateral     Leg Swelling     Right leg swelling x 1 mth     History of Present Illness      Carolee Bartlett is a 69 y.o. female who presents today for chief complaint multiple, see above   . Patient is new to me.    PCP: Renata Gomez MD  Patient comes to appointment alone.     HPI   Leg mass edema of upper ankle, RLE lateral aspect f leh mass which was not viualized on u/s   Neck mass has been drianed prev with derm - EIC, willr efer to derm   Knee pain builateral but R >L and instability   HA     HCC dx- aortic atheroscerosis noted on imaging, she is on statin   Active Problem List with Overview Notes    Diagnosis Date Noted    Aortic atherosclerosis 09/30/2024    Anemia 08/17/2024    Tobacco user 08/17/2024    Calcified granuloma of lung 06/03/2024    Morbid (severe) obesity due to excess calories 06/03/2024    Controlled type 2 diabetes mellitus without complication, without long-term current use of insulin 11/16/2023    Decreased range of right hip movement 08/27/2023    Hip pain 11/07/2022    Glaucoma 06/09/2022    Hyperlipidemia 04/20/2022    Hypertension 04/20/2022    Muscle cramps 04/20/2022    Orthopnea 08/13/2020    Precordial pain 07/02/2020    Heart murmur 06/29/2020    Avascular necrosis of right femur 11/26/2018    S/P total hip arthroplasty 11/26/2018    Postablative hypothyroidism 11/26/2018    Electrocardiogram abnormal 01/16/2017    History of back pain 12/06/2016    History of Graves' disease 12/06/2016       Health Maintenance   Topic Date Due    Foot Exam  Never done    TETANUS VACCINE  Never done    Shingles Vaccine (1 of 2) Never done    DEXA Scan  09/23/2024    Eye Exam  12/13/2024    Hemoglobin A1c  11/15/2024    Mammogram  01/16/2025    Lipid Panel  03/06/2025    Low Dose Statin  08/21/2025     Colorectal Cancer Screening  03/27/2028    Hepatitis C Screening  Completed       Past Medical History:   Diagnosis Date    Avascular necrosis     Diabetes mellitus     Encounter for blood transfusion     Glaucoma     Graves disease     Hypertension     Other abnormal glucose     Pre-Diabetic    Sciatica        Past Surgical History:   Procedure Laterality Date    CATARACT EXTRACTION W/  INTRAOCULAR LENS IMPLANT Right 01/26/2023        CATARACT EXTRACTION W/  INTRAOCULAR LENS IMPLANT Left 09/08/2022        COLONOSCOPY N/A 03/27/2023    Procedure: COLONOSCOPY;  Surgeon: Yohannes Francois MD;  Location: [facility] ENDOSCOPY;  Service: Endoscopy;  Laterality: N/A;  instr via portal; AP  3/24 pre call complete, pt stated she would have a ride -CE    HIP ARTHROPLASTY Right 11/26/2018    Procedure: ARTHROPLASTY, HIP, Raciel, peg board, first assist;  Surgeon: Olayinka Redding MD;  Location: [facility];  Service: Orthopedics;  Laterality: Right;  RACIEL ORTHO CONFIRMED 11/20/DME FIRST ASSISTANT CONFIRMED 11/21/DME    HIP REPLACEMENT ARTHROPLASTY Right     INTRAOCULAR PROSTHESES INSERTION Left 09/08/2022    Procedure: INSERTION, IOL PROSTHESIS;  Surgeon: Soniya Macias MD;  Location: [address];  Service: Ophthalmology;  Laterality: Left;    INTRAOCULAR PROSTHESES INSERTION Right 01/26/2023    Procedure: INSERTION, IOL PROSTHESIS;  Surgeon: Soniya Macias MD;  Location: [facility] OR [address] [place] [address];  Service: Ophthalmology;  Laterality: Right;    PHACOEMULSIFICATION OF CATARACT Left 09/08/2022    Procedure: PHACOEMULSIFICATION, CATARACT;  Surgeon: Soniya Macias MD;  Location: [facility] OR [address] [place] [address];  Service: Ophthalmology;  Laterality: Left;    PHACOEMULSIFICATION OF CATARACT Right 01/26/2023    Procedure: PHACOEMULSIFICATION, CATARACT;  Surgeon: Soniya Macias MD;  Location: [facility] OR [address] [place] [address];  Service: Ophthalmology;  Laterality: Right;    TUBAL LIGATION         family history includes Blindness in her mother;  Breast cancer (age of onset: 33) in her sister; Diabetes in her mother and sister; Glaucoma in her brother and mother; Heart attack (age of onset: 40) in her brother; Hypertension in her mother; Kidney failure in her mother; Stomach cancer in her father.    Social History     Tobacco Use    Smoking status: Former     Current packs/day: 0.00     Average packs/day: 0.3 packs/day for 40.0 years (10.0 ttl pk-yrs)     Types: Cigarettes     Quit date: 2024     Years since quittin.0    Smokeless tobacco: Never   Substance Use Topics    Alcohol use: No    Drug use: No       Review of Systems   Constitutional:  Negative for chills, fever, malaise/fatigue and weight loss.   Respiratory:  Negative for cough, sputum production, shortness of breath and wheezing.    Cardiovascular:  Positive for leg swelling. Negative for chest pain, palpitations and orthopnea.   Gastrointestinal:  Negative for constipation, diarrhea, nausea and vomiting.   Genitourinary:  Negative for dysuria, frequency, hematuria and urgency.   Musculoskeletal:  Positive for joint pain.        Outpatient Encounter Medications as of 7/15/2024   Medication Sig Dispense Refill    atorvastatin (LIPITOR) 10 MG tablet Take 2 tablets (20 mg total) by mouth once daily. 180 tablet 0    cycloSPORINE (RESTASIS) 0.05 % ophthalmic emulsion Place 1 drop into both eyes 2 (two) times daily. 60 each 12    dorzolamide-timolol, PF, (COSOPT PF) 2-0.5 % Dpet ophthalmic solution Place 1 drop into both eyes 2 (two) times daily. 60 each 6    latanoprost, PF, (IYUZEH) 0.005 % Dpet Place 1 drop into both eyes every evening. 30 each 6    levothyroxine (SYNTHROID) 100 MCG tablet TAKE 1 TABLET BY MOUTH BEFORE BREAKFAST. 90 tablet 3    metFORMIN (GLUCOPHAGE) 1000 MG tablet Take 0.5 tablets (500 mg total) by mouth daily with breakfast. 90 tablet 3    [DISCONTINUED] losartan-hydrochlorothiazide 50-12.5 mg (HYZAAR) 50-12.5 mg per tablet Take 1 tablet by mouth once daily. 90 tablet 2  "   [DISCONTINUED] naproxen (NAPROSYN) 500 MG tablet Take 1 tablet (500 mg total) by mouth 2 (two) times daily with meals. 14 tablet 0    meloxicam (MOBIC) 15 MG tablet Take 1 tablet (15 mg total) by mouth once daily. 14 tablet 0     No facility-administered encounter medications on file as of 7/15/2024.        Review of patient's allergies indicates:  No Known Allergies        Physical Exam      Vital Signs  Pulse: 70  SpO2: 98 %  BP: (!) 148/68  BP Location: Right arm  Patient Position: Sitting  Height and Weight  Height: 5' 3" (160 cm)  Weight: 100.7 kg (222 lb 0.1 oz)  BSA (Calculated - sq m): 2.12 sq meters  BMI (Calculated): 39.3  Weight in (lb) to have BMI = 25: 140.8]    Physical Exam  Vitals reviewed.   Constitutional:       General: She is not in acute distress.     Appearance: She is well-developed. She is not diaphoretic.   HENT:      Head: Normocephalic and atraumatic.      Right Ear: External ear normal.      Left Ear: External ear normal.      Nose: Nose normal.   Eyes:      General:         Right eye: No discharge.         Left eye: No discharge.      Conjunctiva/sclera: Conjunctivae normal.   Pulmonary:      Effort: Pulmonary effort is normal. No respiratory distress.   Musculoskeletal:         General: Normal range of motion.      Cervical back: Normal range of motion.      Right lower leg: Edema present.   Skin:     Coloration: Skin is not pale.      Findings: No rash.   Neurological:      Mental Status: She is alert and oriented to person, place, and time.   Psychiatric:         Behavior: Behavior normal.         Thought Content: Thought content normal.          Laboratory:  CBC:  No results for input(s): "WBC", "RBC", "HGB", "HCT", "PLT", "MCV", "MCH", "MCHC" in the last 2160 hours.  CMP:  No results for input(s): "GLU", "CALCIUM", "ALBUMIN", "PROT", "NA", "K", "CO2", "CL", "BUN", "ALKPHOS", "ALT", "AST", "BILITOT" in the last 2160 hours.    Invalid input(s): "CREATININ"  URINALYSIS:  No results " "for input(s): "COLORU", "CLARITYU", "SPECGRAV", "PHUR", "PROTEINUA", "GLUCOSEU", "BILIRUBINCON", "BLOODU", "WBCU", "RBCU", "BACTERIA", "MUCUS", "NITRITE", "LEUKOCYTESUR", "UROBILINOGEN", "HYALINECASTS" in the last 2160 hours.   LIPIDS:  No results for input(s): "TSH", "HDL", "CHOL", "TRIG", "LDLCALC", "CHOLHDL", "NONHDLCHOL", "TOTALCHOLEST" in the last 2160 hours.  TSH:  No results for input(s): "TSH" in the last 2160 hours.  A1C:  No results for input(s): "HGBA1C" in the last 2160 hours.      Radiology:      Assessment/Plan     Carolee Bartlett is a 69 y.o.female with:    1. Tendonitis of ankle  -     X-Ray Ankle Complete 3 View Right; Future; Expected date: 07/15/2024  -     meloxicam (MOBIC) 15 MG tablet; Take 1 tablet (15 mg total) by mouth once daily.  Dispense: 14 tablet; Refill: 0    2. Knee pain, unspecified chronicity, unspecified laterality  -     X-Ray Knee 1 or 2 View Bilateral; Future; Expected date: 07/15/2024    3. Epidermal inclusion cyst  -     Ambulatory referral/consult to Dermatology; Future; Expected date: 07/22/2024    4. Aortic atherosclerosis  Assessment & Plan:  Continue statin            Use of the [employer] Patient Portal discussed and encouraged during today's visit  -Continue current medications and maintain follow up with specialists.    Future Appointments   Date Time Provider Department Center   10/28/2024  9:30 AM Juju Martinez, KASEY [facility] OPTICLB [facility]   10/30/2024 11:00 AM Lillie Marshall MD [facility] DERM [facility]   12/17/2024  7:45 AM PERIMETRY, DRAKE [facility] OPHTHAL [facility]   12/17/2024  8:15 AM Susie Nieves MD [facility] OPHTHAL [facility]       Renata Gomez MD  9/30/2024 2:08 PM    Primary Care Internal Medicine                     "

## 2024-08-17 ENCOUNTER — OFFICE VISIT (OUTPATIENT)
Dept: URGENT CARE | Facility: CLINIC | Age: 69
End: 2024-08-17
Payer: MEDICARE

## 2024-08-17 VITALS
BODY MASS INDEX: 37.21 KG/M2 | OXYGEN SATURATION: 99 % | HEIGHT: 63 IN | DIASTOLIC BLOOD PRESSURE: 70 MMHG | WEIGHT: 210 LBS | RESPIRATION RATE: 20 BRPM | TEMPERATURE: 99 F | SYSTOLIC BLOOD PRESSURE: 134 MMHG | HEART RATE: 98 BPM

## 2024-08-17 DIAGNOSIS — R07.9 CHEST PAIN, UNSPECIFIED TYPE: ICD-10-CM

## 2024-08-17 DIAGNOSIS — J22 LOWER RESPIRATORY INFECTION: Primary | ICD-10-CM

## 2024-08-17 DIAGNOSIS — R05.9 COUGH, UNSPECIFIED TYPE: ICD-10-CM

## 2024-08-17 DIAGNOSIS — F17.200 SMOKER: ICD-10-CM

## 2024-08-17 PROBLEM — D64.9 ANEMIA: Status: ACTIVE | Noted: 2024-08-17

## 2024-08-17 PROBLEM — R94.31 ELECTROCARDIOGRAM ABNORMAL: Status: ACTIVE | Noted: 2017-01-16

## 2024-08-17 PROBLEM — R01.1 HEART MURMUR: Status: ACTIVE | Noted: 2020-06-29

## 2024-08-17 PROBLEM — R06.01 ORTHOPNEA: Status: ACTIVE | Noted: 2020-08-13

## 2024-08-17 PROBLEM — Z72.0 TOBACCO USER: Status: ACTIVE | Noted: 2024-08-17

## 2024-08-17 PROBLEM — R07.2 PRECORDIAL PAIN: Status: ACTIVE | Noted: 2020-07-02

## 2024-08-17 LAB
CTP QC/QA: YES
SARS-COV-2 AG RESP QL IA.RAPID: NEGATIVE

## 2024-08-17 PROCEDURE — 71046 X-RAY EXAM CHEST 2 VIEWS: CPT | Mod: FY,S$GLB,, | Performed by: RADIOLOGY

## 2024-08-17 RX ORDER — IPRATROPIUM BROMIDE 0.5 MG/2.5ML
0.5 SOLUTION RESPIRATORY (INHALATION) ONCE
Status: COMPLETED | OUTPATIENT
Start: 2024-08-17 | End: 2024-08-17

## 2024-08-17 RX ORDER — ALBUTEROL SULFATE 0.83 MG/ML
2.5 SOLUTION RESPIRATORY (INHALATION) ONCE
Status: COMPLETED | OUTPATIENT
Start: 2024-08-17 | End: 2024-08-17

## 2024-08-17 RX ORDER — AMOXICILLIN AND CLAVULANATE POTASSIUM 875; 125 MG/1; MG/1
1 TABLET, FILM COATED ORAL 2 TIMES DAILY
Qty: 14 TABLET | Refills: 0 | Status: SHIPPED | OUTPATIENT
Start: 2024-08-17 | End: 2024-08-24

## 2024-08-17 RX ORDER — DOXYCYCLINE 100 MG/1
100 CAPSULE ORAL 2 TIMES DAILY
Qty: 14 CAPSULE | Refills: 0 | Status: SHIPPED | OUTPATIENT
Start: 2024-08-17 | End: 2024-08-24

## 2024-08-17 RX ORDER — PROMETHAZINE HYDROCHLORIDE AND DEXTROMETHORPHAN HYDROBROMIDE 6.25; 15 MG/5ML; MG/5ML
5 SYRUP ORAL EVERY 4 HOURS PRN
Qty: 180 ML | Refills: 0 | Status: SHIPPED | OUTPATIENT
Start: 2024-08-17 | End: 2024-08-24

## 2024-08-17 RX ORDER — ALBUTEROL SULFATE 90 UG/1
2 INHALANT RESPIRATORY (INHALATION) EVERY 6 HOURS PRN
Qty: 18 G | Refills: 0 | Status: SHIPPED | OUTPATIENT
Start: 2024-08-17 | End: 2025-08-17

## 2024-08-17 RX ORDER — LATANOPROST 50 UG/ML
1 SOLUTION/ DROPS OPHTHALMIC
COMMUNITY
Start: 2024-08-03

## 2024-08-17 RX ADMIN — IPRATROPIUM BROMIDE 0.5 MG: 0.5 SOLUTION RESPIRATORY (INHALATION) at 12:08

## 2024-08-17 RX ADMIN — ALBUTEROL SULFATE 2.5 MG: 0.83 SOLUTION RESPIRATORY (INHALATION) at 12:08

## 2024-08-17 NOTE — PROGRESS NOTES
"Subjective:      Patient ID: Carolee Bartlett is a 69 y.o. female.    Vitals:  height is 5' 3" (1.6 m) and weight is 95.3 kg (210 lb). Her temperature is 98.5 °F (36.9 °C). Her blood pressure is 134/70 and her pulse is 98. Her respiration is 20 and oxygen saturation is 99%.     Chief Complaint: Cough    This is a 69 y.o. female   who presents today with a chief complaint of cold symptoms that began a week ago. She's been taking tylenol to help relieve her symptoms.   She is short of breath and has pain with breathing, worse bending over and walking, bilateral low chest pain that feels like pressure.  She has a severe cough productive of green sputum. She usually smokes daily but hasn't been able to lately.   She has been taking delsym for the cough with little effect    Cough  This is a new problem. The current episode started in the past 7 days. The problem has been gradually worsening. The problem occurs constantly. The cough is Productive of sputum. Associated symptoms include myalgias and shortness of breath. Pertinent negatives include no chest pain, chills, ear congestion, ear pain, fever, headaches, heartburn, hemoptysis, nasal congestion, postnasal drip, rash, rhinorrhea, sore throat, sweats, weight loss or wheezing. Nothing aggravates the symptoms. Treatments tried: tylenol. The treatment provided mild relief.     Constitution: Negative for chills and fever.   HENT:  Positive for congestion. Negative for ear pain, postnasal drip and sore throat.    Cardiovascular:  Negative for chest pain.   Respiratory:  Positive for chest tightness, cough and shortness of breath. Negative for bloody sputum and wheezing.    Gastrointestinal:  Positive for vomiting. Negative for heartburn.   Musculoskeletal:  Positive for muscle ache.   Skin:  Negative for rash.   Neurological:  Negative for headaches.      Objective:     Physical Exam   Constitutional: She is oriented to person, place, and time. She appears " well-developed. She is cooperative.  Non-toxic appearance. She does not appear ill. No distress.   HENT:   Head: Normocephalic and atraumatic.   Ears:   Right Ear: Hearing, tympanic membrane, external ear and ear canal normal.   Left Ear: Hearing, tympanic membrane, external ear and ear canal normal.   Nose: Nose normal. No mucosal edema, rhinorrhea or nasal deformity. No epistaxis. Right sinus exhibits no maxillary sinus tenderness and no frontal sinus tenderness. Left sinus exhibits no maxillary sinus tenderness and no frontal sinus tenderness.   Mouth/Throat: Uvula is midline, oropharynx is clear and moist and mucous membranes are normal. No trismus in the jaw. Normal dentition. No uvula swelling. No oropharyngeal exudate, posterior oropharyngeal edema or posterior oropharyngeal erythema.   Eyes: Conjunctivae and lids are normal. No scleral icterus.   Neck: Trachea normal and phonation normal. Neck supple. No edema present. No erythema present. No neck rigidity present.   Cardiovascular: Normal rate, regular rhythm, normal heart sounds and normal pulses.   Pulmonary/Chest: Effort normal and breath sounds normal. No respiratory distress. She has no decreased breath sounds. She has no rhonchi.   Shallow breaths, pain with breathing         Comments: Shallow breaths, pain with breathing    Abdominal: Normal appearance.   Musculoskeletal: Normal range of motion.         General: No deformity. Normal range of motion.   Neurological: She is alert and oriented to person, place, and time. She exhibits normal muscle tone. Coordination normal.   Skin: Skin is warm, dry, intact, not diaphoretic and not pale.   Psychiatric: Her speech is normal and behavior is normal. Judgment and thought content normal.   Nursing note and vitals reviewed.      Assessment:     1. Cough, unspecified type    2. Chest pain, unspecified type    3. Lower respiratory infection    4. Smoker        Plan:       Cough, unspecified type  -     SARS  Coronavirus 2 Antigen, POCT Manual Read  -     XR CHEST PA AND LATERAL; Future; Expected date: 08/17/2024  -     albuterol nebulizer solution 2.5 mg  -     ipratropium 0.02 % nebulizer solution 0.5 mg    Chest pain, unspecified type  -     IN OFFICE EKG 12-LEAD (to Muse)    Lower respiratory infection  -     amoxicillin-clavulanate 875-125mg (AUGMENTIN) 875-125 mg per tablet; Take 1 tablet by mouth 2 (two) times daily. for 7 days  Dispense: 14 tablet; Refill: 0  -     doxycycline (VIBRAMYCIN) 100 MG Cap; Take 1 capsule (100 mg total) by mouth 2 (two) times daily. for 7 days  Dispense: 14 capsule; Refill: 0  -     albuterol (PROVENTIL/VENTOLIN HFA) 90 mcg/actuation inhaler; Inhale 2 puffs into the lungs every 6 (six) hours as needed for Wheezing. Rescue  Dispense: 18 g; Refill: 0  -     promethazine-dextromethorphan (PROMETHAZINE-DM) 6.25-15 mg/5 mL Syrp; Take 5 mLs by mouth every 4 (four) hours as needed (cough).  Dispense: 180 mL; Refill: 0    Smoker      EKG: normal EKG, normal sinus rhythm, unchanged from previous tracings, nonspecific ST and T waves changes.    Counseled re smoking and need to quit but not willing

## 2024-08-21 ENCOUNTER — OFFICE VISIT (OUTPATIENT)
Dept: OPHTHALMOLOGY | Facility: CLINIC | Age: 69
End: 2024-08-21
Payer: MEDICARE

## 2024-08-21 DIAGNOSIS — Z96.1 PSEUDOPHAKIA, BOTH EYES: ICD-10-CM

## 2024-08-21 DIAGNOSIS — H02.403 PTOSIS, BILATERAL: ICD-10-CM

## 2024-08-21 DIAGNOSIS — H04.123 DRY EYE SYNDROME OF BOTH LACRIMAL GLANDS: ICD-10-CM

## 2024-08-21 DIAGNOSIS — G51.31 HEMIFACIAL SPASM OF RIGHT SIDE OF FACE: ICD-10-CM

## 2024-08-21 DIAGNOSIS — H40.53X3 SEVERE STAGE SECONDARY GLAUCOMA OF BOTH EYES DUE TO COMBINATION MECHANISMS: Primary | ICD-10-CM

## 2024-08-21 PROCEDURE — 1159F MED LIST DOCD IN RCRD: CPT | Mod: CPTII,S$GLB,, | Performed by: STUDENT IN AN ORGANIZED HEALTH CARE EDUCATION/TRAINING PROGRAM

## 2024-08-21 PROCEDURE — 99999 PR PBB SHADOW E&M-EST. PATIENT-LVL III: CPT | Mod: PBBFAC,,, | Performed by: STUDENT IN AN ORGANIZED HEALTH CARE EDUCATION/TRAINING PROGRAM

## 2024-08-21 PROCEDURE — G2211 COMPLEX E/M VISIT ADD ON: HCPCS | Mod: S$GLB,,, | Performed by: STUDENT IN AN ORGANIZED HEALTH CARE EDUCATION/TRAINING PROGRAM

## 2024-08-21 PROCEDURE — 92020 GONIOSCOPY: CPT | Mod: S$GLB,,, | Performed by: STUDENT IN AN ORGANIZED HEALTH CARE EDUCATION/TRAINING PROGRAM

## 2024-08-21 PROCEDURE — 99214 OFFICE O/P EST MOD 30 MIN: CPT | Mod: S$GLB,,, | Performed by: STUDENT IN AN ORGANIZED HEALTH CARE EDUCATION/TRAINING PROGRAM

## 2024-08-21 PROCEDURE — 3044F HG A1C LEVEL LT 7.0%: CPT | Mod: CPTII,S$GLB,, | Performed by: STUDENT IN AN ORGANIZED HEALTH CARE EDUCATION/TRAINING PROGRAM

## 2024-08-21 PROCEDURE — 3288F FALL RISK ASSESSMENT DOCD: CPT | Mod: CPTII,S$GLB,, | Performed by: STUDENT IN AN ORGANIZED HEALTH CARE EDUCATION/TRAINING PROGRAM

## 2024-08-21 PROCEDURE — 1101F PT FALLS ASSESS-DOCD LE1/YR: CPT | Mod: CPTII,S$GLB,, | Performed by: STUDENT IN AN ORGANIZED HEALTH CARE EDUCATION/TRAINING PROGRAM

## 2024-08-21 PROCEDURE — 1126F AMNT PAIN NOTED NONE PRSNT: CPT | Mod: CPTII,S$GLB,, | Performed by: STUDENT IN AN ORGANIZED HEALTH CARE EDUCATION/TRAINING PROGRAM

## 2024-08-21 NOTE — PROGRESS NOTES
Subjective:  HPI    DLS: 4/19/24 w/ Dr. Macias    69 y.o. female presents for IOP Check. Patient denies changes to VA and   flashes. Patient complains of pain OS>OD, especially when reading. Patient   also complains of headaches and white floaters, patient believes both are   related to high BP.    MEDS:  PF Dorzolamide Timolol BID OU  Iyuzeh qhs OU  Restasis BID OU  Last edited by Gem Oropeza on 8/21/2024  8:27 AM.        Exam:  See encounter report for full exam    Assessment:  1. 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 Macias patient, establishing 8/2024  - Surg hx: phaco/IOL OU   - Laser hx: none  - Glaucoma FHx: mother, brother  -  / 640  - Gonio: SS/pseudophakic (Lizbeth 8/2024)  - Tmax: 21/23  - Target IOP: <13  - Med adverse effects: n/a    08/21/2024  IOP at target on regimen below  Imaging reviewed: HVF with SNS/INS OU. OCT thinning OU.    2. Dry eye syndrome of both lacrimal glands  Hx Lupus  Started Xiidra 2/21/22 --> failed --> try restasis (sent 9/1/23) --> Improved!! And covered by insurance --> continue   Pres free AT's about 6 x day   Gel drops or ointment at night   Consider punctal plugs / doxy PRN  Anti-SSA / SSB negative  Will try PF-glc gtts as above    3. Pseudophakia, both eyes  Lenses KIA Macias  OD DIBOO  +21.00 target -0.13   OS DIBOO +21.50 target -0.05      4. Ptosis, bilateral  5. 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 Burden     Plan:  IOP at goal. Update imaging.  - Continue Cosopt PF BID OU, Iyuzeh qhs OU  - Dr. Martinez for MRx mgmt    Today's visit is associated with current and anticipated ongoing medical care related to this patient's single serious/complex condition (glaucoma). Follow up is to be continued indefinitely to monitor the condition.    Return 4 months -- HVF, OCT, VA, IOP    Susie Nieves MD  [facility], Glaucoma

## 2024-08-22 ENCOUNTER — TELEPHONE (OUTPATIENT)
Dept: PRIMARY CARE CLINIC | Facility: CLINIC | Age: 69
End: 2024-08-22
Payer: MEDICARE

## 2024-08-22 DIAGNOSIS — R19.7 DIARRHEA, UNSPECIFIED TYPE: Primary | ICD-10-CM

## 2024-08-22 NOTE — TELEPHONE ENCOUNTER
Pt has a URI and was put on augmentin and doxycycline that now has her with diarrhea. Please advise

## 2024-08-22 NOTE — TELEPHONE ENCOUNTER
----- Message from Nahomi OrNetcordia sent at 8/22/2024  1:29 PM CDT -----  Contact: pt [phone]  Would like to receive medical advice.    Symptoms (please be specific):  diarrhea, coughing and lower back pain    How long has the patient had these symptoms:  last Thursday    Would they like a call back or a response via [facility]:  call back     Additional information:  Pt went to urgent care on Saturday but is not feeling any better.  She was diagnosed with a upper respiratory infection.  She would like to discuss symptoms with nurse.

## 2024-08-23 NOTE — TELEPHONE ENCOUNTER
Spoke with pt and stool has hardened. Do not believe specimen will be processed if she collects. Offered to have supplies left at front for collection if symptoms do not improve. She is starting to feel better and will just monitor and take probiotic

## 2024-09-13 DIAGNOSIS — J22 LOWER RESPIRATORY INFECTION: ICD-10-CM

## 2024-09-14 RX ORDER — ALBUTEROL SULFATE 90 UG/1
2 INHALANT RESPIRATORY (INHALATION) EVERY 6 HOURS PRN
Qty: 18 G | Refills: 0 | Status: SHIPPED | OUTPATIENT
Start: 2024-09-14 | End: 2025-09-14

## 2024-09-26 ENCOUNTER — PATIENT MESSAGE (OUTPATIENT)
Dept: ADMINISTRATIVE | Facility: OTHER | Age: 69
End: 2024-09-26
Payer: MEDICARE

## 2024-09-30 DIAGNOSIS — E03.9 HYPOTHYROIDISM, UNSPECIFIED TYPE: ICD-10-CM

## 2024-09-30 PROBLEM — I70.0 AORTIC ATHEROSCLEROSIS: Status: ACTIVE | Noted: 2024-09-30

## 2024-09-30 RX ORDER — LEVOTHYROXINE SODIUM 100 UG/1
100 TABLET ORAL
Qty: 90 TABLET | Refills: 3 | Status: CANCELLED | OUTPATIENT
Start: 2024-09-30

## 2024-09-30 NOTE — TELEPHONE ENCOUNTER
----- Message from Nery sent at 9/30/2024  9:15 AM CDT -----  Contact: [phone] Patient  Requesting an RX refill or new RX.    Is this a refill or new RX: new    RX name and strength (copy/paste from chart):  levothyroxine (SYNTHROID) 100 MCG tablet    Is this a 30 day or 90 day RX: 90    Pharmacy name and phone # (copy/paste from chart):    [facility]/pharmacy #[id number] - [place] - [address]  [address] [place] [address]  Phone: [phone] Fax: [phone]    The doctors have asked that we provide their patients with the following 2 reminders -- prescription refills can take up to 72 hours, and a friendly reminder that in the future you can use your [facility] account to request refills: yes

## 2024-09-30 NOTE — TELEPHONE ENCOUNTER
Care Due:                  Date            Visit Type   Department     Provider  --------------------------------------------------------------------------------                                EP -                              PRIMARY      [facility] PRIMARY  Last Visit: 07-      CARE ([facility])   HOMER Gomez  Next Visit: None Scheduled  None         None Found                                                            Last  Test          Frequency    Reason                     Performed    Due Date  --------------------------------------------------------------------------------    HBA1C.......  6 months...  metFORMIN................  05-   11-    [facility] Embedded Care Due Messages. Reference number: [id number].   9/30/2024 9:18:31 AM CDT

## 2024-10-02 DIAGNOSIS — E03.9 HYPOTHYROIDISM, UNSPECIFIED TYPE: ICD-10-CM

## 2024-10-02 RX ORDER — LEVOTHYROXINE SODIUM 100 UG/1
100 TABLET ORAL
Qty: 90 TABLET | Refills: 3 | Status: SHIPPED | OUTPATIENT
Start: 2024-10-02

## 2024-10-02 NOTE — TELEPHONE ENCOUNTER
----- Message from Jazmyn sent at 10/2/2024 10:53 AM CDT -----  Contact: [phone]  [email or web address]     Patient is calling for Medical Advice regarding: Patient has not gotten refill or heard anything since Monday morning. levothyroxine (SYNTHROID) 100 MCG tablet    Patient wants a call back or thru [facility]: call    Comments:   [facility]/pharmacy #[id number] - [place] - [address]  [address] [place] [address]  Phone: [phone] Fax: [phone]      Please advise patient replies from provider may take up to 48 hours.

## 2024-10-02 NOTE — TELEPHONE ENCOUNTER
No care due was identified.  [facility] Embedded Care Due Messages. Reference number: [id number].   10/02/2024 10:59:00 AM CDT

## 2024-10-11 DIAGNOSIS — J22 LOWER RESPIRATORY INFECTION: ICD-10-CM

## 2024-10-14 RX ORDER — ALBUTEROL SULFATE 90 UG/1
2 INHALANT RESPIRATORY (INHALATION) EVERY 6 HOURS PRN
Qty: 18 G | Refills: 0 | Status: SHIPPED | OUTPATIENT
Start: 2024-10-14 | End: 2025-10-14

## 2024-10-28 ENCOUNTER — OFFICE VISIT (OUTPATIENT)
Dept: OPTOMETRY | Facility: CLINIC | Age: 69
End: 2024-10-28
Payer: MEDICARE

## 2024-10-28 DIAGNOSIS — H16.223 KERATOCONJUNCTIVITIS SICCA, NOT SPECIFIED AS SJOGREN'S, BILATERAL: Primary | ICD-10-CM

## 2024-10-28 DIAGNOSIS — H52.7 REFRACTIVE ERROR: ICD-10-CM

## 2024-10-28 DIAGNOSIS — E11.9 CONTROLLED TYPE 2 DIABETES MELLITUS WITHOUT COMPLICATION, WITHOUT LONG-TERM CURRENT USE OF INSULIN: ICD-10-CM

## 2024-10-28 PROCEDURE — 92015 DETERMINE REFRACTIVE STATE: CPT | Mod: S$GLB,,, | Performed by: OPTOMETRIST

## 2024-10-28 PROCEDURE — 1159F MED LIST DOCD IN RCRD: CPT | Mod: CPTII,S$GLB,, | Performed by: OPTOMETRIST

## 2024-10-28 PROCEDURE — 99999 PR PBB SHADOW E&M-EST. PATIENT-LVL II: CPT | Mod: PBBFAC,,, | Performed by: OPTOMETRIST

## 2024-10-28 PROCEDURE — 3044F HG A1C LEVEL LT 7.0%: CPT | Mod: CPTII,S$GLB,, | Performed by: OPTOMETRIST

## 2024-10-28 PROCEDURE — 99214 OFFICE O/P EST MOD 30 MIN: CPT | Mod: S$GLB,,, | Performed by: OPTOMETRIST

## 2024-10-28 PROCEDURE — 1126F AMNT PAIN NOTED NONE PRSNT: CPT | Mod: CPTII,S$GLB,, | Performed by: OPTOMETRIST

## 2024-10-28 PROCEDURE — 3288F FALL RISK ASSESSMENT DOCD: CPT | Mod: CPTII,S$GLB,, | Performed by: OPTOMETRIST

## 2024-10-28 PROCEDURE — 1101F PT FALLS ASSESS-DOCD LE1/YR: CPT | Mod: CPTII,S$GLB,, | Performed by: OPTOMETRIST

## 2024-10-28 RX ORDER — CYCLOSPORINE OPHTHALMIC SOLUTION 1 MG/ML
1 SOLUTION/ DROPS OPHTHALMIC 2 TIMES DAILY
Qty: 2 ML | Refills: 11 | Status: SHIPPED | OUTPATIENT
Start: 2024-10-28 | End: 2025-10-28

## 2024-10-30 ENCOUNTER — OFFICE VISIT (OUTPATIENT)
Dept: DERMATOLOGY | Facility: CLINIC | Age: 69
End: 2024-10-30
Payer: MEDICARE

## 2024-10-30 ENCOUNTER — TELEPHONE (OUTPATIENT)
Dept: DERMATOLOGY | Facility: CLINIC | Age: 69
End: 2024-10-30

## 2024-10-30 DIAGNOSIS — L28.1 PRURIGO NODULARIS: Primary | ICD-10-CM

## 2024-10-30 DIAGNOSIS — Z78.0 MENOPAUSE: ICD-10-CM

## 2024-10-30 DIAGNOSIS — L72.0 EPIDERMAL INCLUSION CYST: ICD-10-CM

## 2024-10-30 PROCEDURE — 99204 OFFICE O/P NEW MOD 45 MIN: CPT | Mod: 25,S$GLB,, | Performed by: STUDENT IN AN ORGANIZED HEALTH CARE EDUCATION/TRAINING PROGRAM

## 2024-10-30 PROCEDURE — 1160F RVW MEDS BY RX/DR IN RCRD: CPT | Mod: CPTII,S$GLB,, | Performed by: STUDENT IN AN ORGANIZED HEALTH CARE EDUCATION/TRAINING PROGRAM

## 2024-10-30 PROCEDURE — 1126F AMNT PAIN NOTED NONE PRSNT: CPT | Mod: CPTII,S$GLB,, | Performed by: STUDENT IN AN ORGANIZED HEALTH CARE EDUCATION/TRAINING PROGRAM

## 2024-10-30 PROCEDURE — 1159F MED LIST DOCD IN RCRD: CPT | Mod: CPTII,S$GLB,, | Performed by: STUDENT IN AN ORGANIZED HEALTH CARE EDUCATION/TRAINING PROGRAM

## 2024-10-30 PROCEDURE — 3288F FALL RISK ASSESSMENT DOCD: CPT | Mod: CPTII,S$GLB,, | Performed by: STUDENT IN AN ORGANIZED HEALTH CARE EDUCATION/TRAINING PROGRAM

## 2024-10-30 PROCEDURE — 99999 PR PBB SHADOW E&M-EST. PATIENT-LVL III: CPT | Mod: PBBFAC,,, | Performed by: STUDENT IN AN ORGANIZED HEALTH CARE EDUCATION/TRAINING PROGRAM

## 2024-10-30 PROCEDURE — 88304 TISSUE EXAM BY PATHOLOGIST: CPT | Performed by: PATHOLOGY

## 2024-10-30 PROCEDURE — 3044F HG A1C LEVEL LT 7.0%: CPT | Mod: CPTII,S$GLB,, | Performed by: STUDENT IN AN ORGANIZED HEALTH CARE EDUCATION/TRAINING PROGRAM

## 2024-10-30 PROCEDURE — 1101F PT FALLS ASSESS-DOCD LE1/YR: CPT | Mod: CPTII,S$GLB,, | Performed by: STUDENT IN AN ORGANIZED HEALTH CARE EDUCATION/TRAINING PROGRAM

## 2024-10-30 RX ORDER — MOMETASONE FUROATE 1 MG/G
OINTMENT TOPICAL
Qty: 60 G | Refills: 1 | Status: SHIPPED | OUTPATIENT
Start: 2024-10-30

## 2024-11-01 LAB
FINAL PATHOLOGIC DIAGNOSIS: NORMAL
GROSS: NORMAL
Lab: NORMAL
MICROSCOPIC EXAM: NORMAL

## 2024-11-04 ENCOUNTER — TELEPHONE (OUTPATIENT)
Dept: PRIMARY CARE CLINIC | Facility: CLINIC | Age: 69
End: 2024-11-04
Payer: MEDICARE

## 2024-11-04 NOTE — TELEPHONE ENCOUNTER
----- Message from Med Assistant Ocampo sent at 11/4/2024 10:55 AM CST -----  Contact: Carolee @ [phone]  The patient is calling to speak with staff about bone density test scheduled on 11/14. Please give her a call back at [phone].

## 2024-11-11 ENCOUNTER — LAB VISIT (OUTPATIENT)
Dept: LAB | Facility: HOSPITAL | Age: 69
End: 2024-11-11
Payer: MEDICARE

## 2024-11-11 ENCOUNTER — CLINICAL SUPPORT (OUTPATIENT)
Dept: DERMATOLOGY | Facility: CLINIC | Age: 69
End: 2024-11-11
Payer: MEDICARE

## 2024-11-11 DIAGNOSIS — E11.9 CONTROLLED TYPE 2 DIABETES MELLITUS WITHOUT COMPLICATION, WITHOUT LONG-TERM CURRENT USE OF INSULIN: ICD-10-CM

## 2024-11-11 DIAGNOSIS — Z48.02 VISIT FOR SUTURE REMOVAL: Primary | ICD-10-CM

## 2024-11-11 LAB
ESTIMATED AVG GLUCOSE: 148 MG/DL (ref 68–131)
HBA1C MFR BLD: 6.8 % (ref 4–5.6)

## 2024-11-11 PROCEDURE — 36415 COLL VENOUS BLD VENIPUNCTURE: CPT | Performed by: INTERNAL MEDICINE

## 2024-11-11 PROCEDURE — 99024 POSTOP FOLLOW-UP VISIT: CPT | Mod: S$GLB,,, | Performed by: STUDENT IN AN ORGANIZED HEALTH CARE EDUCATION/TRAINING PROGRAM

## 2024-11-11 PROCEDURE — 83036 HEMOGLOBIN GLYCOSYLATED A1C: CPT | Performed by: INTERNAL MEDICINE

## 2024-11-11 NOTE — PROGRESS NOTES
Suture Removal note:  CC: 69 y.o. female patient is here for suture removal.         HPI: Patient is s/p excision of follicular cyst infundibular type from the skin, left neck on 10/30/2024.  Patient reports no problems.    WOUND PE:  Sutures intact.  Wound healing well.  Good approximation of skin edges.  No signs or symptoms of infection.    IMPRESSION:  The specimen is received in formalin labeled &quot;L. neck&quot;. The specimen consists of a 0.5 x 0.9 x 0.3 cm ruptured cyst with a portion of dark brown, puckered, firm fragment of skin measuring 0.6 x 0.5 cm. The presumed resection margin is inked   blue. The specimen is trisected to reveal a dark brown, friable, crumbly, dense material.  The specimen is submitted entirely in cassette  - margins clear.    PLAN:  Sutures removed today.  Continue wound care.    RTC: In PRN months.

## 2024-11-14 ENCOUNTER — HOSPITAL ENCOUNTER (OUTPATIENT)
Dept: RADIOLOGY | Facility: HOSPITAL | Age: 69
Discharge: HOME | End: 2024-11-14
Attending: INTERNAL MEDICINE
Payer: MEDICARE

## 2024-11-14 DIAGNOSIS — Z78.0 MENOPAUSE: ICD-10-CM

## 2024-11-14 PROCEDURE — 77080 DXA BONE DENSITY AXIAL: CPT | Mod: TC

## 2024-12-17 ENCOUNTER — CLINICAL SUPPORT (OUTPATIENT)
Dept: OPHTHALMOLOGY | Facility: CLINIC | Age: 69
End: 2024-12-17
Payer: MEDICARE

## 2024-12-17 ENCOUNTER — OFFICE VISIT (OUTPATIENT)
Dept: OPHTHALMOLOGY | Facility: CLINIC | Age: 69
End: 2024-12-17
Payer: MEDICARE

## 2024-12-17 DIAGNOSIS — H40.53X3 SEVERE STAGE SECONDARY GLAUCOMA OF BOTH EYES DUE TO COMBINATION MECHANISMS: Primary | ICD-10-CM

## 2024-12-17 DIAGNOSIS — H04.123 DRY EYE SYNDROME OF BOTH LACRIMAL GLANDS: ICD-10-CM

## 2024-12-17 DIAGNOSIS — H02.403 PTOSIS, BILATERAL: ICD-10-CM

## 2024-12-17 DIAGNOSIS — G51.31 HEMIFACIAL SPASM OF RIGHT SIDE OF FACE: ICD-10-CM

## 2024-12-17 DIAGNOSIS — Z96.1 PSEUDOPHAKIA, BOTH EYES: ICD-10-CM

## 2024-12-17 PROCEDURE — 3288F FALL RISK ASSESSMENT DOCD: CPT | Mod: CPTII,S$GLB,, | Performed by: STUDENT IN AN ORGANIZED HEALTH CARE EDUCATION/TRAINING PROGRAM

## 2024-12-17 PROCEDURE — G2211 COMPLEX E/M VISIT ADD ON: HCPCS | Mod: S$GLB,,, | Performed by: STUDENT IN AN ORGANIZED HEALTH CARE EDUCATION/TRAINING PROGRAM

## 2024-12-17 PROCEDURE — 99214 OFFICE O/P EST MOD 30 MIN: CPT | Mod: S$GLB,,, | Performed by: STUDENT IN AN ORGANIZED HEALTH CARE EDUCATION/TRAINING PROGRAM

## 2024-12-17 PROCEDURE — 3044F HG A1C LEVEL LT 7.0%: CPT | Mod: CPTII,S$GLB,, | Performed by: STUDENT IN AN ORGANIZED HEALTH CARE EDUCATION/TRAINING PROGRAM

## 2024-12-17 PROCEDURE — 1159F MED LIST DOCD IN RCRD: CPT | Mod: CPTII,S$GLB,, | Performed by: STUDENT IN AN ORGANIZED HEALTH CARE EDUCATION/TRAINING PROGRAM

## 2024-12-17 PROCEDURE — 99999 PR PBB SHADOW E&M-EST. PATIENT-LVL II: CPT | Mod: PBBFAC,,, | Performed by: STUDENT IN AN ORGANIZED HEALTH CARE EDUCATION/TRAINING PROGRAM

## 2024-12-17 PROCEDURE — 1101F PT FALLS ASSESS-DOCD LE1/YR: CPT | Mod: CPTII,S$GLB,, | Performed by: STUDENT IN AN ORGANIZED HEALTH CARE EDUCATION/TRAINING PROGRAM

## 2024-12-17 PROCEDURE — 1126F AMNT PAIN NOTED NONE PRSNT: CPT | Mod: CPTII,S$GLB,, | Performed by: STUDENT IN AN ORGANIZED HEALTH CARE EDUCATION/TRAINING PROGRAM

## 2024-12-17 PROCEDURE — 92133 CPTRZD OPH DX IMG PST SGM ON: CPT | Mod: S$GLB,,, | Performed by: STUDENT IN AN ORGANIZED HEALTH CARE EDUCATION/TRAINING PROGRAM

## 2024-12-17 PROCEDURE — 92083 EXTENDED VISUAL FIELD XM: CPT | Mod: S$GLB,,, | Performed by: STUDENT IN AN ORGANIZED HEALTH CARE EDUCATION/TRAINING PROGRAM

## 2024-12-17 NOTE — PROGRESS NOTES
Subjective:  HPI    Gtt's:  PF Dorzolamide Timolol BID OU  Iyuzeh qhs OU  Systane 7x a day       Carolee Bartlett is a 69 y.o. female who comes in for 4 month f/u.   Pt. Reports no change of vision since last visit.  Pt. Denies new floaters, flashes, irritation, pain or discomfort.    Last edited by Geri Evans on 12/17/2024  9:40 AM.        Exam:  See encounter report for full exam    Assessment:  1. 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 Macias patient, establishing 8/2024  - Surg hx: phaco/IOL OU   - Laser hx: none  - Glaucoma FHx: mother, brother  -  / 640  - Gonio: SS/pseudophakic (Coulon 8/2024)  - Tmax: 21/23  - Target IOP: <13  - Med adverse effects: n/a    Imaging reviewed: HVF with SNS/INS OU. OCT thinning OU.    12/17/2024  IOP at target on unclear regimen  HVF: mod reliable, SNS, inf depression, VFI 92 OD  poorly reliable, nasal depression, VFI 84 -- improved c/w last/poorly reliable  OCT: IT thinning, avg 81 OD  sup/inf thinning, avg 65 -- first BMO scan    2. Dry eye syndrome of both lacrimal glands  Hx Lupus  Started Xiidra 2/21/22 --> failed --> try restasis (sent 9/1/23) --> Improved!! And covered by insurance --> continue   Pres free AT's about 6 x day   Gel drops or ointment at night   Consider punctal plugs / doxy PRN  Anti-SSA / SSB negative  Will try PF-glc gtts as above    3. Pseudophakia, both eyes  Lenses KIA Macias  OD DIBOO  +21.00 target -0.13   OS DIBOO +21.50 target -0.05      4. Ptosis, bilateral  5. 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 Burden     Plan:  IOP at goal. HVF poorly reliable, OCT baseline BMO.   Unclear regimen, asked to bring drops to next visit  - Continue Iyuzeh qhs OU (? Not taking Cosopt PF BID OU)  - Dr. Martinez for MRx mgmt    Today's visit is associated with current and anticipated ongoing medical care related to this patient's single serious/complex condition (glaucoma). Follow up is to be continued  indefinitely to monitor the condition.    Return 4 months -- HVF, VA, IOP    Susie Nieves MD  [facility], Glaucoma

## 2024-12-17 NOTE — PROGRESS NOTES
HVF/OCT rel/fix coop fair OD poor oS /chart checked for latex allergy/0.75 + 0.75 x 95 OD 0 + 1.00 x 65 OS -BJ

## 2025-01-03 ENCOUNTER — OFFICE VISIT (OUTPATIENT)
Dept: URGENT CARE | Facility: CLINIC | Age: 70
End: 2025-01-03
Payer: MEDICARE

## 2025-01-03 VITALS
HEIGHT: 63 IN | SYSTOLIC BLOOD PRESSURE: 119 MMHG | WEIGHT: 210 LBS | OXYGEN SATURATION: 98 % | BODY MASS INDEX: 37.21 KG/M2 | HEART RATE: 59 BPM | DIASTOLIC BLOOD PRESSURE: 73 MMHG | TEMPERATURE: 99 F | RESPIRATION RATE: 17 BRPM

## 2025-01-03 DIAGNOSIS — S93.601A SPRAIN OF RIGHT FOOT, INITIAL ENCOUNTER: ICD-10-CM

## 2025-01-03 DIAGNOSIS — S99.921A INJURY OF RIGHT FOOT, INITIAL ENCOUNTER: Primary | ICD-10-CM

## 2025-01-03 PROCEDURE — 73630 X-RAY EXAM OF FOOT: CPT | Mod: FY,RT,S$GLB, | Performed by: RADIOLOGY

## 2025-01-03 RX ORDER — DICLOFENAC SODIUM 10 MG/G
2 GEL TOPICAL DAILY
Qty: 50 G | Refills: 0 | Status: SHIPPED | OUTPATIENT
Start: 2025-01-03 | End: 2025-01-13

## 2025-01-03 RX ORDER — ACETAMINOPHEN AND CODEINE PHOSPHATE 300; 30 MG/1; MG/1
1 TABLET ORAL EVERY 6 HOURS PRN
Qty: 6 TABLET | Refills: 0 | Status: CANCELLED | OUTPATIENT
Start: 2025-01-03

## 2025-01-03 NOTE — PROGRESS NOTES
"Subjective:      Patient ID: Carolee Bartlett is a 69 y.o. female.    Vitals:  height is 5' 2.99" (1.6 m) and weight is 95.3 kg (210 lb). Her oral temperature is 98.7 °F (37.1 °C). Her blood pressure is 119/73 and her pulse is 59 (abnormal). Her respiration is 17 and oxygen saturation is 98%.     Chief Complaint: Foot Pain    This is a 69 y.o female who presents today with chief complaint of Rt foot pain/pinky toe x3 days.   Reports that she hit it while she was in her kitchen, reports severe pain. Home tx: aleeve and reports no improvement.     Foot Pain  This is a new problem. The current episode started in the past 7 days. Associated symptoms include arthralgias and joint swelling. Pertinent negatives include no abdominal pain, anorexia, change in bowel habit, chest pain, chills, congestion, coughing, diaphoresis, fatigue, fever, headaches, myalgias, nausea, neck pain, numbness, rash, sore throat, swollen glands, urinary symptoms, vertigo, visual change, vomiting or weakness. Nothing aggravates the symptoms. She has tried NSAIDs for the symptoms. The treatment provided no relief.       Constitution: Negative for chills, sweating, fatigue and fever.   HENT:  Negative for congestion and sore throat.    Neck: Negative for neck pain.   Cardiovascular:  Negative for chest pain.   Respiratory:  Negative for cough.    Gastrointestinal:  Negative for abdominal pain, nausea and vomiting.   Musculoskeletal:  Positive for pain, trauma, joint pain and joint swelling. Negative for muscle ache.   Skin:  Negative for rash and erythema.   Neurological:  Negative for history of vertigo, headaches and numbness.      Objective:     Physical Exam   Constitutional: She is oriented to person, place, and time.  Non-toxic appearance. She does not appear ill. No distress.      Comments:Patient sits comfortably in exam chair. Answers questions in complete sentences. Does not show any signs of distress or discoloration.        HENT: "   Head: Normocephalic and atraumatic.   Ears:   Right Ear: External ear normal.   Left Ear: External ear normal.   Nose: Nose normal.   Eyes: Pupils are equal, round, and reactive to light. Extraocular movement intact   Pulmonary/Chest: Effort normal. No respiratory distress.   Abdominal: Normal appearance.   Musculoskeletal:         General: Swelling, tenderness and signs of injury present. No deformity.      Right lower leg: No edema.      Left lower leg: No edema.        Feet:    Neurological: no focal deficit. She is alert and oriented to person, place, and time.   Skin: Skin is warm, dry, not diaphoretic, not pale and no rash. Capillary refill takes less than 2 seconds. No bruising, No erythema and No lesion jaundice  Psychiatric: Her behavior is normal. Mood, judgment and thought content normal.     X-Ray Foot Complete 3 view Right    Result Date: 1/3/2025  EXAMINATION: XR FOOT COMPLETE 3 VIEW RIGHT CLINICAL HISTORY: . Unspecified injury of right foot, initial encounter TECHNIQUE: AP, lateral, and oblique views of the right foot were performed. COMPARISON: None FINDINGS: No fracture or dislocation.  No bone destruction identified     See above Electronically signed by: Omi Gonsalez MD Date:    01/03/2025 Time:    12:10    Posterior Segment OCT Optic Nerve- Both eyes    Result Date: 12/17/2024  Right Eye Quality was good. Scan locations included Retinal Nerve Fiber Layer (RNFL). Left Eye Quality was good. Scan locations included Retinal Nerve Fiber Layer (RNFL). Findings Right Eye Low. TI. Left Eye TI, TS.     George Visual Field - OU - Extended - Both Eyes    Result Date: 12/17/2024  Right Eye Reliability was borderline. Findings include non-specific defects, superior nasal step defect. Improved. Left Eye Reliability was good. Findings include superior nasal step defect, inferior nasal step defect. Improved.      Assessment:     1. Injury of right foot, initial encounter    2. Sprain of right foot, initial  encounter        Plan:       Injury of right foot, initial encounter  -     X-Ray Foot Complete 3 view Right; Future; Expected date: 01/03/2025    Sprain of right foot, initial encounter  -     Ambulatory referral/consult to Orthopedics  -     diclofenac sodium (VOLTAREN ARTHRITIS PAIN) 1 % Gel; Apply 2 g topically once daily. for 10 days  Dispense: 50 g; Refill: 0                Patient Instructions   - Rest.    - Drink plenty of fluids.    - Acetaminophen (tylenol) or Ibuprofen (advil,motrin) as directed as needed for fever/pain. Avoid tylenol if you have a history of liver disease. Do not take ibuprofen if you have a history of GI bleeding, kidney disease, or if you take blood thinners.   - Ibuprofen dosing for adults: 400 mg by mouth every 4-6 hours as needed. Max: 2400 mg/day; Info: use lowest effective dose, shortest effective treatment duration; give w/ food if GI upset occurs.  - Tylenol dosing for adults: [By mouth route, immediate-release form] Dose: 325-1000 mg by mouth every 4-6h as needed; Max: 1 g/4h and 4 g/day from all sources. [By mouth route, extended-release form] Dose: 650-1300 mg Extended Release by mouth every 8h as needed; Max: 4 g/day from all sources.     - You must understand that you have received an Urgent Care treatment only and that you may be released before all of your medical problems are known or treated.   - You, the patient, will arrange for follow up care as instructed.   - If your condition worsens or fails to improve we recommend that you receive another evaluation at the ER immediately or contact your PCP to discuss your concerns or return here.   - Follow up with your PCP or specialty clinic as directed in the next 1-2 weeks if not improved or as needed.  You can call [phone] to schedule an appointment with the appropriate provider.    If your symptoms do not improve or worsen, go to the emergency room immediately.

## 2025-01-28 ENCOUNTER — HOSPITAL ENCOUNTER (OUTPATIENT)
Dept: RADIOLOGY | Facility: HOSPITAL | Age: 70
Discharge: HOME | End: 2025-01-28
Attending: NURSE PRACTITIONER
Payer: MEDICARE

## 2025-01-28 ENCOUNTER — OFFICE VISIT (OUTPATIENT)
Dept: ORTHOPEDICS | Facility: CLINIC | Age: 70
End: 2025-01-28
Payer: MEDICARE

## 2025-01-28 DIAGNOSIS — M79.674 PAIN IN TOE OF RIGHT FOOT: Primary | ICD-10-CM

## 2025-01-28 DIAGNOSIS — M79.674 PAIN IN TOE OF RIGHT FOOT: ICD-10-CM

## 2025-01-28 DIAGNOSIS — S92.524A CLOSED NONDISPLACED FRACTURE OF MIDDLE PHALANX OF LESSER TOE OF RIGHT FOOT, INITIAL ENCOUNTER: Primary | ICD-10-CM

## 2025-01-28 PROCEDURE — 73660 X-RAY EXAM OF TOE(S): CPT | Mod: TC,RT

## 2025-01-28 PROCEDURE — 1160F RVW MEDS BY RX/DR IN RCRD: CPT | Mod: CPTII,S$GLB,, | Performed by: NURSE PRACTITIONER

## 2025-01-28 PROCEDURE — 99999 PR PBB SHADOW E&M-EST. PATIENT-LVL III: CPT | Mod: PBBFAC,,, | Performed by: NURSE PRACTITIONER

## 2025-01-28 PROCEDURE — 3288F FALL RISK ASSESSMENT DOCD: CPT | Mod: CPTII,S$GLB,, | Performed by: NURSE PRACTITIONER

## 2025-01-28 PROCEDURE — 1125F AMNT PAIN NOTED PAIN PRSNT: CPT | Mod: CPTII,S$GLB,, | Performed by: NURSE PRACTITIONER

## 2025-01-28 PROCEDURE — 99214 OFFICE O/P EST MOD 30 MIN: CPT | Mod: S$GLB,,, | Performed by: NURSE PRACTITIONER

## 2025-01-28 PROCEDURE — 1101F PT FALLS ASSESS-DOCD LE1/YR: CPT | Mod: CPTII,S$GLB,, | Performed by: NURSE PRACTITIONER

## 2025-01-28 PROCEDURE — 1159F MED LIST DOCD IN RCRD: CPT | Mod: CPTII,S$GLB,, | Performed by: NURSE PRACTITIONER

## 2025-01-28 PROCEDURE — 73660 X-RAY EXAM OF TOE(S): CPT | Mod: 26,RT,, | Performed by: RADIOLOGY

## 2025-01-28 RX ORDER — NAPROXEN 500 MG/1
500 TABLET ORAL 2 TIMES DAILY WITH MEALS
Qty: 20 TABLET | Refills: 0 | Status: SHIPPED | OUTPATIENT
Start: 2025-01-28 | End: 2025-02-07

## 2025-01-28 NOTE — PROGRESS NOTES
SUBJECTIVE:   History of Present Illness    CHIEF COMPLAINT:  - Right fifth toe pain following injury    HPI:  Carolee presents to the clinic for evaluation of a right little toe injury that occurred on New Year's Day. She hit her toe on the corner of a bench, stating she hit it forcefully enough to hear it pop and could tell it was out of socket. She initially sought care at an Urgent Care facility, where she was told the toe was not fractured or broken. However, she continued to have pain and swelling, prompting this follow-up visit.    Pain persists, and she reports inability to wear shoes. She has difficulty with full weight-bearing due to pain and cannot roll onto her toes when walking. She denies any numbness or tingling sensation in the foot.    For pain management, she has been taking OTC Advil, as she does not tolerate pain medications well. The Urgent Care provider prescribed a numbing cream, which she has been using.    PREVIOUS TREATMENTS:  - Carolee went to Urgent Care after the injury. They provided a cream to numb the toe, which was ineffective.  - Carolee has been taking OTC Advil for pain relief.  - Carolee tried clifford taping the toe, which provided some relief initially.    MEDICATIONS:  - Advil (ibuprofen): Over-the-counter  - Aleve (naproxen): Over-the-counter  - Tylenol (acetaminophen): Over-the-counter  - Aleve PM: Over-the-counter, sleep aid  - Diclofenac gel: Applied topically to the affected area  - Mobic (meloxicam): Previously prescribed but not currently taking      ROS:  Constitutional: -fever, -chills  Eyes: -visual changes  ENT: -nasal congestion, -sore throat  Respiratory: -cough, -shortness of breath  Cardiovascular: -chest pain, -palpitations  Gastrointestinal: -nausea, -vomiting  Genitourinary: -hematuria, -dysuria  Integument/Breast: -rash, -pruritus, -breast skin changes  Hematologic/Lymphatic: -easy bruising, -lymphadenopathy  Musculoskeletal: +arthralgia, -myalgia  Neurological:  -seizures, -tremors, +difficulty walking, -numbness, -tingling  Behavioral/Psych: -hallucinations  Endocrine: -heat intolerance, -cold intolerance         Review of patient's allergies indicates:  No Known Allergies      Current Outpatient Medications   Medication Sig Dispense Refill    albuterol (PROVENTIL/VENTOLIN HFA) 90 mcg/actuation inhaler INHALE 2 PUFFS INTO THE LUNGS EVERY 6 (SIX) HOURS AS NEEDED FOR WHEEZING. RESCUE 18 g 0    atorvastatin (LIPITOR) 10 MG tablet Take 2 tablets (20 mg total) by mouth once daily. 180 tablet 0    dorzolamide-timolol, PF, (COSOPT PF) 2-0.5 % Dpet ophthalmic solution Place 1 drop into both eyes 2 (two) times daily. 60 each 6    latanoprost 0.005 % ophthalmic solution 1 drop.      latanoprost, PF, (IYUZEH) 0.005 % Dpet Place 1 drop into both eyes every evening. 30 each 6    levothyroxine (SYNTHROID) 100 MCG tablet Take 1 tablet (100 mcg total) by mouth before breakfast. 90 tablet 3    metFORMIN (GLUMETZA) 1000 MG (MOD) 24hr tablet Take 1 tablet (1,000 mg total) by mouth daily with breakfast. 90 tablet 1    mometasone (ELOCON) 0.1 % ointment aaa bid. Not more than 2 weeks straight in same location. Avoid use on face and groin 60 g 1    valsartan-hydrochlorothiazide (DIOVAN-HCT) 160-12.5 mg per tablet TAKE 1 TABLET BY MOUTH EVERY DAY 90 tablet 1    cycloSPORINE (VEVYE) 0.1 % Drop Place 1 drop into both eyes 2 (two) times a day. (Patient not taking: Reported on 12/17/2024) 2 mL 11    naproxen (NAPROSYN) 500 MG tablet Take 1 tablet (500 mg total) by mouth 2 (two) times daily with meals. for 10 days 20 tablet 0     No current facility-administered medications for this visit.       Past Medical History:   Diagnosis Date    Avascular necrosis     Diabetes mellitus     Encounter for blood transfusion     Glaucoma     Graves disease     Hypertension     Other abnormal glucose     Pre-Diabetic    Sciatica        Past Surgical History:   Procedure Laterality Date    CATARACT EXTRACTION W/   INTRAOCULAR LENS IMPLANT Right 01/26/2023        CATARACT EXTRACTION W/  INTRAOCULAR LENS IMPLANT Left 09/08/2022        COLONOSCOPY N/A 03/27/2023    Procedure: COLONOSCOPY;  Surgeon: Yohannes Francois MD;  Location: [facility] ENDOSCOPY;  Service: Endoscopy;  Laterality: N/A;  instr via portal; AP  3/24 pre call complete, pt stated she would have a ride -CE    HIP ARTHROPLASTY Right 11/26/2018    Procedure: ARTHROPLASTY, HIP, Raciel, peg board, first assist;  Surgeon: Olayinka Redding MD;  Location: [facility] OR;  Service: Orthopedics;  Laterality: Right;  RACIEL ORTHO CONFIRMED 11/20/DME FIRST ASSISTANT CONFIRMED 11/21/DME    HIP REPLACEMENT ARTHROPLASTY Right     INTRAOCULAR PROSTHESES INSERTION Left 09/08/2022    Procedure: INSERTION, IOL PROSTHESIS;  Surgeon: Soniya Macias MD;  [facility];  Service: Ophthalmology;  Laterality: Left;    INTRAOCULAR PROSTHESES INSERTION Right 01/26/2023    Procedure: INSERTION, IOL PROSTHESIS;  Surgeon: Soniya Macias MD;  [facility];  Service: Ophthalmology;  Laterality: Right;    PHACOEMULSIFICATION OF CATARACT Left 09/08/2022    Procedure: PHACOEMULSIFICATION, CATARACT;  Surgeon: Soniya Macias MD;  [facility];  Service: Ophthalmology;  Laterality: Left;    PHACOEMULSIFICATION OF CATARACT Right 01/26/2023    Procedure: PHACOEMULSIFICATION, CATARACT;  Surgeon: Soniya Macias MD;  [facility];  Service: Ophthalmology;  Laterality: Right;    TUBAL LIGATION         Family History   Problem Relation Name Age of Onset    Kidney failure Mother      Hypertension Mother          was on list to have kidney, lung, and heart transplant    Glaucoma Mother      Blindness Mother      Diabetes Mother      Stomach cancer Father      Breast cancer Sister  33        double mastectomy    Diabetes Sister      Heart attack Brother  40    Glaucoma Brother      Amblyopia Neg Hx      Cataracts Neg Hx      Macular  "degeneration Neg Hx      Retinal detachment Neg Hx      Strabismus Neg Hx       OBJECTIVE:     PHYSICAL EXAM:  Vital Signs (Most Recent)  There were no vitals filed for this visit.     ,   Estimated body mass index is 37.21 kg/m² as calculated from the following:    Height as of 1/3/25: 5' 2.99" (1.6 m).    Weight as of 1/3/25: 95.3 kg (210 lb).   General Appearance: Well nourished, well developed, in no acute distress.  HENT: Normal cephalic, oropharynx pink and moist  Eyes: PERRLA bilaterally and EOM x 4  Respiratory: Even and unlabored  Skin: Warm and Dry.   Psychiatric: AAO x 4, Mood & affect are normal.    Physical Exam    Cardiovascular: 2+ pedal pulse times two.  Musculoskeletal: Able to flex and curl toes. Dorsiflexion and plantar flexion about 25 degrees. Internal and external rotation about 20 degrees. Sensitivity along the base of the fifth toe.  IMAGING:  - X-rays of the right toe: Initial read showed no fracture.  - Current X-rays of the right toe: Reveal a non-displaced phalanx fracture of the fifth metatarsal.         All radiographs were personally reviewed by me.    ASSESSMENT/PLAN:       ICD-10-CM ICD-9-CM   1. Closed nondisplaced fracture of middle phalanx of lesser toe of right foot, initial encounter  S92.524A 826.0     Assessment & Plan    MEDICATIONS:  - Naproxen sodium (prescription strength) to be taken 2 times daily with food for 10 days.  - Tylenol (extra strength) 2 capsules up to 3 times daily as needed.  - Resume OTC Aleve after finishing naproxen sodium.    IMAGING:  - Ordered XR Toe in 1 month.    PROCEDURES:  - Placed patient in a post-op shoe to keep the foot flat and reduce pain during ambulation.  - Took x-rays of the right toe, revealing a non-displaced phalanx fracture of the fifth metatarsal.    FOLLOW UP:  - Follow up in 1 month for repeat x-ray.    PATIENT INSTRUCTIONS:  - Avoid weight-bearing activities such as running, jumping, hiking, and skipping.  - Refrain from " high-impact activities, including enthusiastic celebrations at Super Bowl parties.  - Walk with foot flat; avoid rolling onto toes.  - May clifford tape toe if it provides comfort, but it's not necessary.         This note was generated with the assistance of ambient listening technology. Verbal consent was obtained by the patient and accompanying visitor(s) for the recording of patient appointment to facilitate this note. I attest to having reviewed and edited the generated note for accuracy, though some syntax or spelling errors may persist. Please contact the author of this note for any clarification.

## 2025-01-29 ENCOUNTER — PATIENT MESSAGE (OUTPATIENT)
Dept: ORTHOPEDICS | Facility: CLINIC | Age: 70
End: 2025-01-29
Payer: MEDICARE

## 2025-02-03 ENCOUNTER — HOSPITAL ENCOUNTER (OUTPATIENT)
Dept: RADIOLOGY | Facility: HOSPITAL | Age: 70
Discharge: HOME | End: 2025-02-03
Attending: INTERNAL MEDICINE
Payer: MEDICARE

## 2025-02-03 ENCOUNTER — HOSPITAL ENCOUNTER (OUTPATIENT)
Dept: RADIOLOGY | Facility: HOSPITAL | Age: 70
Discharge: HOME | End: 2025-02-03
Payer: MEDICARE

## 2025-02-03 VITALS — BODY MASS INDEX: 37.21 KG/M2 | WEIGHT: 210 LBS | HEIGHT: 63 IN

## 2025-02-03 DIAGNOSIS — Z12.31 ENCOUNTER FOR SCREENING MAMMOGRAM FOR MALIGNANT NEOPLASM OF BREAST: ICD-10-CM

## 2025-02-03 DIAGNOSIS — Z12.31 ENCOUNTER FOR SCREENING MAMMOGRAM FOR BREAST CANCER: ICD-10-CM

## 2025-02-03 PROCEDURE — 77063 BREAST TOMOSYNTHESIS BI: CPT | Mod: TC

## 2025-02-03 PROCEDURE — 77063 BREAST TOMOSYNTHESIS BI: CPT | Mod: 26,,, | Performed by: RADIOLOGY

## 2025-02-03 PROCEDURE — 77067 SCR MAMMO BI INCL CAD: CPT | Mod: 26,,, | Performed by: RADIOLOGY

## 2025-02-27 ENCOUNTER — OFFICE VISIT (OUTPATIENT)
Dept: INTERNAL MEDICINE | Facility: CLINIC | Age: 70
End: 2025-02-27
Payer: MEDICARE

## 2025-02-27 ENCOUNTER — HOSPITAL ENCOUNTER (OUTPATIENT)
Dept: RADIOLOGY | Facility: HOSPITAL | Age: 70
Discharge: HOME | End: 2025-02-27
Attending: NURSE PRACTITIONER
Payer: MEDICARE

## 2025-02-27 ENCOUNTER — OFFICE VISIT (OUTPATIENT)
Dept: ORTHOPEDICS | Facility: CLINIC | Age: 70
End: 2025-02-27
Payer: MEDICARE

## 2025-02-27 VITALS
OXYGEN SATURATION: 97 % | HEART RATE: 69 BPM | WEIGHT: 218.25 LBS | DIASTOLIC BLOOD PRESSURE: 68 MMHG | HEIGHT: 62 IN | BODY MASS INDEX: 40.16 KG/M2 | SYSTOLIC BLOOD PRESSURE: 112 MMHG

## 2025-02-27 DIAGNOSIS — E11.69 HYPERLIPIDEMIA ASSOCIATED WITH TYPE 2 DIABETES MELLITUS: ICD-10-CM

## 2025-02-27 DIAGNOSIS — M25.551 PAIN OF RIGHT HIP: ICD-10-CM

## 2025-02-27 DIAGNOSIS — E11.9 CONTROLLED TYPE 2 DIABETES MELLITUS WITHOUT COMPLICATION, WITHOUT LONG-TERM CURRENT USE OF INSULIN: ICD-10-CM

## 2025-02-27 DIAGNOSIS — Z87.39 HISTORY OF BACK PAIN: ICD-10-CM

## 2025-02-27 DIAGNOSIS — S92.524D CLOSED NONDISPLACED FRACTURE OF MIDDLE PHALANX OF LESSER TOE OF RIGHT FOOT WITH ROUTINE HEALING, SUBSEQUENT ENCOUNTER: Primary | ICD-10-CM

## 2025-02-27 DIAGNOSIS — M79.674 PAIN IN RIGHT TOE(S): Primary | ICD-10-CM

## 2025-02-27 DIAGNOSIS — Z00.00 ANNUAL WELLNESS VISIT: Primary | ICD-10-CM

## 2025-02-27 DIAGNOSIS — M79.674 PAIN IN RIGHT TOE(S): ICD-10-CM

## 2025-02-27 DIAGNOSIS — E78.5 HYPERLIPIDEMIA ASSOCIATED WITH TYPE 2 DIABETES MELLITUS: ICD-10-CM

## 2025-02-27 DIAGNOSIS — I10 HYPERTENSION, UNSPECIFIED TYPE: ICD-10-CM

## 2025-02-27 DIAGNOSIS — D64.9 ANEMIA, UNSPECIFIED TYPE: ICD-10-CM

## 2025-02-27 DIAGNOSIS — E03.9 HYPOTHYROIDISM, UNSPECIFIED TYPE: ICD-10-CM

## 2025-02-27 LAB
ALBUMIN/CREAT UR: 5.6 UG/MG (ref 0–30)
CREAT UR-MCNC: 195 MG/DL (ref 15–325)
MICROALBUMIN UR DL<=1MG/L-MCNC: 11 UG/ML

## 2025-02-27 PROCEDURE — 99999 PR PBB SHADOW E&M-EST. PATIENT-LVL III: CPT | Mod: PBBFAC,GC,,

## 2025-02-27 PROCEDURE — 1101F PT FALLS ASSESS-DOCD LE1/YR: CPT | Mod: CPTII,S$GLB,, | Performed by: NURSE PRACTITIONER

## 2025-02-27 PROCEDURE — 82043 UR ALBUMIN QUANTITATIVE: CPT

## 2025-02-27 PROCEDURE — 99999 PR PBB SHADOW E&M-EST. PATIENT-LVL III: CPT | Mod: PBBFAC,,, | Performed by: NURSE PRACTITIONER

## 2025-02-27 PROCEDURE — 99213 OFFICE O/P EST LOW 20 MIN: CPT | Mod: S$GLB,,, | Performed by: NURSE PRACTITIONER

## 2025-02-27 PROCEDURE — 1125F AMNT PAIN NOTED PAIN PRSNT: CPT | Mod: CPTII,S$GLB,, | Performed by: NURSE PRACTITIONER

## 2025-02-27 PROCEDURE — 1159F MED LIST DOCD IN RCRD: CPT | Mod: CPTII,S$GLB,, | Performed by: NURSE PRACTITIONER

## 2025-02-27 PROCEDURE — 3288F FALL RISK ASSESSMENT DOCD: CPT | Mod: CPTII,S$GLB,, | Performed by: NURSE PRACTITIONER

## 2025-02-27 PROCEDURE — 73660 X-RAY EXAM OF TOE(S): CPT | Mod: TC,RT

## 2025-02-27 PROCEDURE — 73660 X-RAY EXAM OF TOE(S): CPT | Mod: 26,RT,, | Performed by: RADIOLOGY

## 2025-02-27 RX ORDER — DICLOFENAC SODIUM 10 MG/G
2 GEL TOPICAL DAILY
Qty: 100 EACH | Refills: 1 | Status: SHIPPED | OUTPATIENT
Start: 2025-02-27

## 2025-02-27 NOTE — PROGRESS NOTES
SUBJECTIVE:   History of Present Illness    CHIEF COMPLAINT:  - Right pinky toe fracture follow-up    HPI:  Carolee presents for follow-up of a toe fracture that occurred approximately 6 weeks ago. The right pinky toe still feels abnormal with inability to flex it. The toe is tender at the base. Carolee has difficulty walking. Carolee has been using soft-soled shoes to help keep the toe in place, as she found the previously provided shoe inadequate in holding the toe securely. Her current shoes have improved her walking, noting that it holds the toe in place without movement. She has been engaging in low-impact activities as previously advised.    PREVIOUS TREATMENTS:  - Boot or shoe for the toe fracture: Used about 4-5 weeks ago, difficult to keep the toe in place as it could not be tightened enough  - Soft sole shoes: Currently using, helps keep the toe in place better    MEDICATIONS:  - Tylenol or Ibuprofen: as needed for pain or discomfort      ROS:  Constitutional: -fever, -chills  Eyes: -visual changes  ENT: -nasal congestion, -sore throat  Respiratory: -cough, -shortness of breath  Cardiovascular: -chest pain, -palpitations  Gastrointestinal: -nausea, -vomiting  Genitourinary: -hematuria, -dysuria  Integument/Breast: -rash, -pruritus, -breast skin changes  Hematologic/Lymphatic: -easy bruising, -lymphadenopathy  Musculoskeletal: +arthralgia, -myalgia  Neurological: -seizures, -tremors  Behavioral/Psych: -hallucinations  Endocrine: -heat intolerance, -cold intolerance         Review of patient's allergies indicates:  No Known Allergies      Current Medications[1]    Past Medical History:   Diagnosis Date    Avascular necrosis     Diabetes mellitus     Encounter for blood transfusion     Glaucoma     Graves disease     Hypertension     Other abnormal glucose     Pre-Diabetic    Sciatica        Past Surgical History:   Procedure Laterality Date    CATARACT EXTRACTION W/  INTRAOCULAR LENS IMPLANT Right 01/26/2023         CATARACT EXTRACTION W/  INTRAOCULAR LENS IMPLANT Left 09/08/2022        COLONOSCOPY N/A 03/27/2023    Procedure: COLONOSCOPY;  Surgeon: Yohannes Francois MD;  [facility];  Service: Endoscopy;  Laterality: N/A;  instr via portal; AP  3/24 pre call complete, pt stated she would have a ride -CE    HIP ARTHROPLASTY Right 11/26/2018    Procedure: ARTHROPLASTY, HIP, [place], peg board, first assist;  Surgeon: Olayinka Redding MD;  Location: [facility] OR;  Service: Orthopedics;  Laterality: Right;  RACIEL ORTHO CONFIRMED 11/20/DME FIRST ASSISTANT CONFIRMED 11/21/DME    HIP REPLACEMENT ARTHROPLASTY Right     INTRAOCULAR PROSTHESES INSERTION Left 09/08/2022    Procedure: INSERTION, IOL PROSTHESIS;  Surgeon: Soniya Macias MD;  [facility];  Service: Ophthalmology;  Laterality: Left;    INTRAOCULAR PROSTHESES INSERTION Right 01/26/2023    Procedure: INSERTION, IOL PROSTHESIS;  Surgeon: Soniya Macias MD;  [facility];  Service: Ophthalmology;  Laterality: Right;    PHACOEMULSIFICATION OF CATARACT Left 09/08/2022    Procedure: PHACOEMULSIFICATION, CATARACT;  Surgeon: Soniya Macias MD;  [facility];  Service: Ophthalmology;  Laterality: Left;    PHACOEMULSIFICATION OF CATARACT Right 01/26/2023    Procedure: PHACOEMULSIFICATION, CATARACT;  Surgeon: Soniya Macias MD;  [facility];  Service: Ophthalmology;  Laterality: Right;    TUBAL LIGATION         Family History   Problem Relation Name Age of Onset    Kidney failure Mother      Hypertension Mother          was on list to have kidney, lung, and heart transplant    Glaucoma Mother      Blindness Mother      Diabetes Mother      Stomach cancer Father      Breast cancer Sister  33        double mastectomy    Diabetes Sister      Heart attack Brother  40    Glaucoma Brother      Amblyopia Neg Hx      Cataracts Neg Hx      Macular degeneration Neg Hx      Retinal detachment Neg Hx   "    Strabismus Neg Hx       OBJECTIVE:     PHYSICAL EXAM:  Vital Signs (Most Recent)  There were no vitals filed for this visit.     ,   Estimated body mass index is 37.21 kg/m² as calculated from the following:    Height as of 2/3/25: 5' 2.99" (1.6 m).    Weight as of 2/3/25: 95.3 kg (210 lb).   General Appearance: Well nourished, well developed, in no acute distress.  HENT: Normal cephalic, oropharynx pink and moist  Eyes: PERRLA bilaterally and EOM x 4  Respiratory: Even and unlabored  Skin: Warm and Dry.   Psychiatric: AAO x 4, Mood & affect are normal.    Physical Exam    Musculoskeletal: Mild tenderness at the base of the pinky toe. Dorsiflexion plantar flexion is 30 degrees. Internal and external rotation about 20 degrees. No redness, bruising or swelling.  Cardiovascular: Good strong pulses.  IMAGING:  - X-rays of the toes: 2025, show a phalangeal fracture of the fifth toe, which appears to be stable and showing signs of healing, no abnormalities noted in the metatarsal area         All radiographs were personally reviewed by me.    ASSESSMENT/PLAN:       ICD-10-CM ICD-9-CM   1. Closed nondisplaced fracture of middle phalanx of lesser toe of right foot with routine healing, subsequent encounter  S92.524D V54.19     Assessment & Plan    MEDICATIONS:  - Take Tylenol or Ibuprofen as needed for pain.    PROCEDURES:  - Palpated base of pinky toe, noting mild tenderness.    FOLLOW UP:  - Follow up in 6 weeks if pain does not improve.  - Check patient portal for radiologist's report by end of day.    PATIENT INSTRUCTIONS:  - Continue using current soft-soled shoes that provide stability to the injured toe.  - Extend low-impact activities for another 1-2 months.  - Gradually ease into normal routine over the next 6 weeks.  - Walk with the foot flat to avoid rolling onto the toes.         This note was generated with the assistance of ambient listening technology. Verbal consent was obtained by the patient and " accompanying visitor(s) for the recording of patient appointment to facilitate this note. I attest to having reviewed and edited the generated note for accuracy, though some syntax or spelling errors may persist. Please contact the author of this note for any clarification.           [1]   Current Outpatient Medications   Medication Sig Dispense Refill    albuterol (PROVENTIL/VENTOLIN HFA) 90 mcg/actuation inhaler INHALE 2 PUFFS INTO THE LUNGS EVERY 6 (SIX) HOURS AS NEEDED FOR WHEEZING. RESCUE 18 g 0    atorvastatin (LIPITOR) 10 MG tablet Take 2 tablets (20 mg total) by mouth once daily. 180 tablet 0    dorzolamide-timolol, PF, (COSOPT PF) 2-0.5 % Dpet ophthalmic solution Place 1 drop into both eyes 2 (two) times daily. 60 each 6    latanoprost 0.005 % ophthalmic solution 1 drop.      latanoprost, PF, (IYUZEH) 0.005 % Dpet Place 1 drop into both eyes every evening. 30 each 6    levothyroxine (SYNTHROID) 100 MCG tablet Take 1 tablet (100 mcg total) by mouth before breakfast. 90 tablet 3    metFORMIN (GLUMETZA) 1000 MG (MOD) 24hr tablet Take 1 tablet (1,000 mg total) by mouth daily with breakfast. 90 tablet 1    mometasone (ELOCON) 0.1 % ointment aaa bid. Not more than 2 weeks straight in same location. Avoid use on face and groin 60 g 1    valsartan-hydrochlorothiazide (DIOVAN-HCT) 160-12.5 mg per tablet TAKE 1 TABLET BY MOUTH EVERY DAY 90 tablet 1    cycloSPORINE (VEVYE) 0.1 % Drop Place 1 drop into both eyes 2 (two) times a day. (Patient not taking: Reported on 2/27/2025) 2 mL 11     No current facility-administered medications for this visit.

## 2025-02-28 ENCOUNTER — TELEPHONE (OUTPATIENT)
Dept: ORTHOPEDICS | Facility: CLINIC | Age: 70
End: 2025-02-28
Payer: MEDICARE

## 2025-02-28 ENCOUNTER — TELEPHONE (OUTPATIENT)
Dept: OPTOMETRY | Facility: CLINIC | Age: 70
End: 2025-02-28
Payer: MEDICARE

## 2025-02-28 NOTE — TELEPHONE ENCOUNTER
Spoke with pt, appointment scheduled. Acceptance verbalized   ----- Message from Vinicius sent at 2/28/2025  9:43 AM CST -----  Type:  Appointment Request Name of Caller:Olga Lidia is the first available appointment?No accessSymptoms:M25.551 (ICD-10-CM) - Pain of right hipWould the patient rather a call back or a response via [facility]? [facility] Back Number:[phone]

## 2025-02-28 NOTE — TELEPHONE ENCOUNTER
----- Message from Abdoul sent at 2/28/2025  9:38 AM CST -----  Regarding: appointment access  Contact: [phone]  Pt is calling to get scheduled with an appointment . Please contact pt with an appoitment and please contact pt with a call.Carolee [phone]

## 2025-02-28 NOTE — PROGRESS NOTES
I have reviewed the notes, assessments, and/or procedures performed by Dr. Rubio, I concur with her documentation of Carolee Bartlett.  Date of Service: 2/27/2025

## 2025-03-06 ENCOUNTER — PATIENT MESSAGE (OUTPATIENT)
Dept: HEPATOLOGY | Facility: HOSPITAL | Age: 70
End: 2025-03-06
Payer: MEDICARE

## 2025-03-06 DIAGNOSIS — E11.9 CONTROLLED TYPE 2 DIABETES MELLITUS WITHOUT COMPLICATION, WITHOUT LONG-TERM CURRENT USE OF INSULIN: ICD-10-CM

## 2025-03-06 DIAGNOSIS — E89.0 POSTABLATIVE HYPOTHYROIDISM: ICD-10-CM

## 2025-03-06 DIAGNOSIS — E78.5 HYPERLIPIDEMIA, UNSPECIFIED HYPERLIPIDEMIA TYPE: Primary | ICD-10-CM

## 2025-03-06 RX ORDER — LEVOTHYROXINE SODIUM 75 UG/1
75 TABLET ORAL
Qty: 30 TABLET | Refills: 11 | Status: SHIPPED | OUTPATIENT
Start: 2025-03-06 | End: 2026-03-06

## 2025-03-06 NOTE — TELEPHONE ENCOUNTER
Spoke with patient to review lab results. Discussed lowering synthroid dose to 75 mcg given low TSH. Will continue to trend levels every 3 months. Additionally, cholesterol, triglycerides, and LDL have increased quite a bit over the last year. Patient just completed a virtual visit with Dr. Lucas for enrollment in [facility] programs. Lipitor dosing was increased to 20 mg.     Lifestyle modification strongly encouraged. This includes incorporating 150 minutes of moderate-intensity aerobic exercise per week. Patient will also need to minimize consumption of saturated fats (e.g. red meat and dairy products) and work to eliminate trans fats from diet (e.g. fried foods and baked goods, such as pastries, pizza dough, pie crust, cookies and crackers). Unsaturated fats eaten in moderation may be eaten in place of these (e.g. fish, plant-based foods such as avocados, olives and walnuts; liquid vegetable oils, such as soybean, corn, safflower, canola, olive and sunflower).     Patient will follow-up with me in clinic on 5/22/2025. Will get repeat labs to review at that time.

## 2025-03-12 ENCOUNTER — HOSPITAL ENCOUNTER (OUTPATIENT)
Dept: RADIOLOGY | Facility: HOSPITAL | Age: 70
Discharge: HOME | End: 2025-03-12
Payer: MEDICARE

## 2025-03-12 DIAGNOSIS — E03.9 HYPOTHYROIDISM, UNSPECIFIED TYPE: ICD-10-CM

## 2025-03-12 PROCEDURE — 76536 US EXAM OF HEAD AND NECK: CPT | Mod: 26,,, | Performed by: RADIOLOGY

## 2025-03-12 PROCEDURE — 76536 US EXAM OF HEAD AND NECK: CPT | Mod: TC

## 2025-03-12 NOTE — PROGRESS NOTES
Orthopedic Trauma Surgery History and Physical Exam    CC: right hip pain    HPI: Patient is a 70-year-old female with 6 months of right hip pain.  She reports that the pain came on insidiously and has been progressively getting worse with time. It is now very severe. She says that in 1 week she took over 80 pills of Aleve to help relieve the pain.  She is also taking ibuprofen and Tylenol.  She has been applying diclofenac gel on the hip.  Her primary care provider recommended physical therapy and she has done several sessions of physical therapy without any relief.    She reports that the worst time is when she 1st gets out of the bed in the morning.  She feels it when she flexes her hip up against gravity such as getting out of the bed, rolling in bed or getting out of a chair that is when it is most severe.  That is when she feels the pain in the groin and lateral hip. The pain does improve with some movement, but if she is walking for a long time it worsens again. If she is at the grocery store, she can only walk around the store if she has a shopping cart or walker to bend over to help to relieve the pain. Although the pain does radiate down to the knee, she has no pain down into the calf or foot.    Carolee had a hip replacement performed in 2017 by Dr. Redding at [place].  She did very well after this.  He is no longer with [facility] in [place] and has his moved his practice to the [place] so she has not followed up with anyone recently.    SH:  Cares for her 2-year-old at home.    PMH:  Past Medical History:   Diagnosis Date    Avascular necrosis     Diabetes mellitus     Encounter for blood transfusion     Glaucoma     Graves disease     Hypertension     Other abnormal glucose     Pre-Diabetic    Sciatica        PSH:  Past Surgical History:   Procedure Laterality Date    CATARACT EXTRACTION W/  INTRAOCULAR LENS IMPLANT Right 01/26/2023        CATARACT EXTRACTION W/  INTRAOCULAR LENS IMPLANT  Left 09/08/2022        COLONOSCOPY N/A 03/27/2023    Procedure: COLONOSCOPY;  Surgeon: Yohannes Francois MD;  Location: [facility] ENDOSCOPY;  Service: Endoscopy;  Laterality: N/A;  instr via portal; AP  3/24 pre call complete, pt stated she would have a ride -CE    HIP ARTHROPLASTY Right 11/26/2018    Procedure: ARTHROPLASTY, HIP, [place], peg board, first assist;  Surgeon: Olayinka Redding MD;  Location: [facility] OR;  Service: Orthopedics;  Laterality: Right;  NOE ORTHO CONFIRMED 11/20/DME FIRST ASSISTANT CONFIRMED 11/21/DME    HIP REPLACEMENT ARTHROPLASTY Right     INTRAOCULAR PROSTHESES INSERTION Left 09/08/2022    Procedure: INSERTION, IOL PROSTHESIS;  Surgeon: Soniya Macias MD;  Location: [facility] OR 1ST FLR;  Service: Ophthalmology;  Laterality: Left;    INTRAOCULAR PROSTHESES INSERTION Right 01/26/2023    Procedure: INSERTION, IOL PROSTHESIS;  Surgeon: Soniya Macias MD;  Location: [facility] OR 1ST FLR;  Service: Ophthalmology;  Laterality: Right;    PHACOEMULSIFICATION OF CATARACT Left 09/08/2022    Procedure: PHACOEMULSIFICATION, CATARACT;  Surgeon: Soniya Macias MD;  Location: [facility] OR 1ST FLR;  Service: Ophthalmology;  Laterality: Left;    PHACOEMULSIFICATION OF CATARACT Right 01/26/2023    Procedure: PHACOEMULSIFICATION, CATARACT;  Surgeon: Soniya Macias MD;  Location: [facility] OR 1ST FLR;  Service: Ophthalmology;  Laterality: Right;    TUBAL LIGATION         Imaging: I have independently reviewed and interpreted this patient's imaging.  X-rays lumbar spine demonstrate very mild grade 1 spondylolisthesis at L5-S1.  X-rays of the hip demonstrate a very slight lucency around the distal aspect of the femoral stem.    PE:  Pain is located at both left and right SI joints, right lateral greater trochanteric region and right anterior groin.  Nontender to palpation in these regions.  Demonstrates 5/5 motor and sensory function of L4-S1.    Assessment and Plan:  Carolee has 6 months of right hip pain.  Her  symptoms as well as her x-ray findings do not demonstrate a clear cause.  My differential diagnosis would include lumbar spinal stenosis and right hip aseptic loosening.  I will obtain a lumbar spine MRI to evaluate for lumbar spinal stenosis.  I have referred her for evaluation with the total joint team to evaluate for aseptic loosening.  She unfortunately has exhausted all conservative measures.  She has tried physical therapy without any improvement.  She does find that NSAIDs help however she is taking the maximum amount that she can.  I will see her after the referral with the joints team as well as the MRI to discuss further.

## 2025-03-14 ENCOUNTER — TELEPHONE (OUTPATIENT)
Dept: ORTHOPEDICS | Facility: CLINIC | Age: 70
End: 2025-03-14
Payer: MEDICARE

## 2025-03-17 ENCOUNTER — TELEPHONE (OUTPATIENT)
Dept: ORTHOPEDICS | Facility: CLINIC | Age: 70
End: 2025-03-17

## 2025-03-17 ENCOUNTER — HOSPITAL ENCOUNTER (OUTPATIENT)
Dept: RADIOLOGY | Facility: HOSPITAL | Age: 70
Discharge: HOME | End: 2025-03-17
Attending: STUDENT IN AN ORGANIZED HEALTH CARE EDUCATION/TRAINING PROGRAM
Payer: MEDICARE

## 2025-03-17 ENCOUNTER — OFFICE VISIT (OUTPATIENT)
Dept: ORTHOPEDICS | Facility: CLINIC | Age: 70
End: 2025-03-17
Payer: MEDICARE

## 2025-03-17 VITALS — BODY MASS INDEX: 39.26 KG/M2 | WEIGHT: 221.56 LBS | HEIGHT: 63 IN

## 2025-03-17 DIAGNOSIS — M25.551 PAIN OF RIGHT HIP: ICD-10-CM

## 2025-03-17 DIAGNOSIS — M54.9 DORSALGIA, UNSPECIFIED: Primary | ICD-10-CM

## 2025-03-17 DIAGNOSIS — M25.551 PAIN OF RIGHT HIP: Primary | ICD-10-CM

## 2025-03-17 PROCEDURE — 1125F AMNT PAIN NOTED PAIN PRSNT: CPT | Mod: CPTII,S$GLB,, | Performed by: STUDENT IN AN ORGANIZED HEALTH CARE EDUCATION/TRAINING PROGRAM

## 2025-03-17 PROCEDURE — 72100 X-RAY EXAM L-S SPINE 2/3 VWS: CPT | Mod: 26,,, | Performed by: RADIOLOGY

## 2025-03-17 PROCEDURE — 3061F NEG MICROALBUMINURIA REV: CPT | Mod: CPTII,S$GLB,, | Performed by: STUDENT IN AN ORGANIZED HEALTH CARE EDUCATION/TRAINING PROGRAM

## 2025-03-17 PROCEDURE — 3066F NEPHROPATHY DOC TX: CPT | Mod: CPTII,S$GLB,, | Performed by: STUDENT IN AN ORGANIZED HEALTH CARE EDUCATION/TRAINING PROGRAM

## 2025-03-17 PROCEDURE — 3044F HG A1C LEVEL LT 7.0%: CPT | Mod: CPTII,S$GLB,, | Performed by: STUDENT IN AN ORGANIZED HEALTH CARE EDUCATION/TRAINING PROGRAM

## 2025-03-17 PROCEDURE — 1160F RVW MEDS BY RX/DR IN RCRD: CPT | Mod: CPTII,S$GLB,, | Performed by: STUDENT IN AN ORGANIZED HEALTH CARE EDUCATION/TRAINING PROGRAM

## 2025-03-17 PROCEDURE — 99999 PR PBB SHADOW E&M-EST. PATIENT-LVL IV: CPT | Mod: PBBFAC,,, | Performed by: STUDENT IN AN ORGANIZED HEALTH CARE EDUCATION/TRAINING PROGRAM

## 2025-03-17 PROCEDURE — 73502 X-RAY EXAM HIP UNI 2-3 VIEWS: CPT | Mod: TC,RT

## 2025-03-17 PROCEDURE — 3008F BODY MASS INDEX DOCD: CPT | Mod: CPTII,S$GLB,, | Performed by: STUDENT IN AN ORGANIZED HEALTH CARE EDUCATION/TRAINING PROGRAM

## 2025-03-17 PROCEDURE — 99214 OFFICE O/P EST MOD 30 MIN: CPT | Mod: S$GLB,,, | Performed by: STUDENT IN AN ORGANIZED HEALTH CARE EDUCATION/TRAINING PROGRAM

## 2025-03-17 PROCEDURE — 72100 X-RAY EXAM L-S SPINE 2/3 VWS: CPT | Mod: TC

## 2025-03-17 PROCEDURE — 1159F MED LIST DOCD IN RCRD: CPT | Mod: CPTII,S$GLB,, | Performed by: STUDENT IN AN ORGANIZED HEALTH CARE EDUCATION/TRAINING PROGRAM

## 2025-03-17 PROCEDURE — 73502 X-RAY EXAM HIP UNI 2-3 VIEWS: CPT | Mod: 26,RT,, | Performed by: RADIOLOGY

## 2025-03-18 ENCOUNTER — RESULTS FOLLOW-UP (OUTPATIENT)
Dept: CRITICAL CARE MEDICINE | Facility: HOSPITAL | Age: 70
End: 2025-03-18

## 2025-03-25 ENCOUNTER — PATIENT MESSAGE (OUTPATIENT)
Dept: ADMINISTRATIVE | Facility: OTHER | Age: 70
End: 2025-03-25
Payer: MEDICARE

## 2025-03-25 ENCOUNTER — OFFICE VISIT (OUTPATIENT)
Dept: OBSTETRICS AND GYNECOLOGY | Facility: CLINIC | Age: 70
End: 2025-03-25
Payer: MEDICARE

## 2025-03-25 ENCOUNTER — HOSPITAL ENCOUNTER (OUTPATIENT)
Dept: RADIOLOGY | Facility: HOSPITAL | Age: 70
Discharge: HOME | End: 2025-03-25
Attending: STUDENT IN AN ORGANIZED HEALTH CARE EDUCATION/TRAINING PROGRAM
Payer: MEDICARE

## 2025-03-25 VITALS
WEIGHT: 219.56 LBS | BODY MASS INDEX: 38.9 KG/M2 | HEIGHT: 63 IN | DIASTOLIC BLOOD PRESSURE: 68 MMHG | SYSTOLIC BLOOD PRESSURE: 115 MMHG

## 2025-03-25 DIAGNOSIS — Z12.4 PAP SMEAR FOR CERVICAL CANCER SCREENING: ICD-10-CM

## 2025-03-25 DIAGNOSIS — Z11.51 SCREENING FOR HPV (HUMAN PAPILLOMAVIRUS): ICD-10-CM

## 2025-03-25 DIAGNOSIS — N89.8 VAGINAL DISCHARGE: ICD-10-CM

## 2025-03-25 DIAGNOSIS — M54.9 DORSALGIA, UNSPECIFIED: ICD-10-CM

## 2025-03-25 DIAGNOSIS — Z01.419 ENCOUNTER FOR GYNECOLOGICAL EXAMINATION: Primary | ICD-10-CM

## 2025-03-25 PROCEDURE — 3288F FALL RISK ASSESSMENT DOCD: CPT | Mod: CPTII,S$GLB,, | Performed by: STUDENT IN AN ORGANIZED HEALTH CARE EDUCATION/TRAINING PROGRAM

## 2025-03-25 PROCEDURE — 1160F RVW MEDS BY RX/DR IN RCRD: CPT | Mod: CPTII,S$GLB,, | Performed by: STUDENT IN AN ORGANIZED HEALTH CARE EDUCATION/TRAINING PROGRAM

## 2025-03-25 PROCEDURE — 87624 HPV HI-RISK TYP POOLED RSLT: CPT | Performed by: STUDENT IN AN ORGANIZED HEALTH CARE EDUCATION/TRAINING PROGRAM

## 2025-03-25 PROCEDURE — 81515 NFCT DS BV&VAGINITIS DNA ALG: CPT | Performed by: STUDENT IN AN ORGANIZED HEALTH CARE EDUCATION/TRAINING PROGRAM

## 2025-03-25 PROCEDURE — 3066F NEPHROPATHY DOC TX: CPT | Mod: CPTII,S$GLB,, | Performed by: STUDENT IN AN ORGANIZED HEALTH CARE EDUCATION/TRAINING PROGRAM

## 2025-03-25 PROCEDURE — 1101F PT FALLS ASSESS-DOCD LE1/YR: CPT | Mod: CPTII,S$GLB,, | Performed by: STUDENT IN AN ORGANIZED HEALTH CARE EDUCATION/TRAINING PROGRAM

## 2025-03-25 PROCEDURE — 3008F BODY MASS INDEX DOCD: CPT | Mod: CPTII,S$GLB,, | Performed by: STUDENT IN AN ORGANIZED HEALTH CARE EDUCATION/TRAINING PROGRAM

## 2025-03-25 PROCEDURE — 3044F HG A1C LEVEL LT 7.0%: CPT | Mod: CPTII,S$GLB,, | Performed by: STUDENT IN AN ORGANIZED HEALTH CARE EDUCATION/TRAINING PROGRAM

## 2025-03-25 PROCEDURE — 3078F DIAST BP <80 MM HG: CPT | Mod: CPTII,S$GLB,, | Performed by: STUDENT IN AN ORGANIZED HEALTH CARE EDUCATION/TRAINING PROGRAM

## 2025-03-25 PROCEDURE — 3061F NEG MICROALBUMINURIA REV: CPT | Mod: CPTII,S$GLB,, | Performed by: STUDENT IN AN ORGANIZED HEALTH CARE EDUCATION/TRAINING PROGRAM

## 2025-03-25 PROCEDURE — 72148 MRI LUMBAR SPINE W/O DYE: CPT | Mod: 26,,, | Performed by: RADIOLOGY

## 2025-03-25 PROCEDURE — 72148 MRI LUMBAR SPINE W/O DYE: CPT | Mod: TC

## 2025-03-25 PROCEDURE — 88175 CYTOPATH C/V AUTO FLUID REDO: CPT | Mod: TC | Performed by: STUDENT IN AN ORGANIZED HEALTH CARE EDUCATION/TRAINING PROGRAM

## 2025-03-25 PROCEDURE — 1159F MED LIST DOCD IN RCRD: CPT | Mod: CPTII,S$GLB,, | Performed by: STUDENT IN AN ORGANIZED HEALTH CARE EDUCATION/TRAINING PROGRAM

## 2025-03-25 PROCEDURE — 99999 PR PBB SHADOW E&M-EST. PATIENT-LVL III: CPT | Mod: PBBFAC,,, | Performed by: STUDENT IN AN ORGANIZED HEALTH CARE EDUCATION/TRAINING PROGRAM

## 2025-03-25 PROCEDURE — 3074F SYST BP LT 130 MM HG: CPT | Mod: CPTII,S$GLB,, | Performed by: STUDENT IN AN ORGANIZED HEALTH CARE EDUCATION/TRAINING PROGRAM

## 2025-03-25 PROCEDURE — G0101 CA SCREEN;PELVIC/BREAST EXAM: HCPCS | Mod: S$GLB,,, | Performed by: STUDENT IN AN ORGANIZED HEALTH CARE EDUCATION/TRAINING PROGRAM

## 2025-04-01 ENCOUNTER — OFFICE VISIT (OUTPATIENT)
Dept: ORTHOPEDICS | Facility: CLINIC | Age: 70
End: 2025-04-01
Payer: MEDICARE

## 2025-04-01 ENCOUNTER — LAB VISIT (OUTPATIENT)
Dept: LAB | Facility: HOSPITAL | Age: 70
End: 2025-04-01
Payer: MEDICARE

## 2025-04-01 VITALS — HEIGHT: 63 IN | BODY MASS INDEX: 39.31 KG/M2 | WEIGHT: 221.88 LBS

## 2025-04-01 DIAGNOSIS — Z96.641 S/P HIP REPLACEMENT, RIGHT: ICD-10-CM

## 2025-04-01 DIAGNOSIS — M25.551 PAIN OF RIGHT HIP: ICD-10-CM

## 2025-04-01 DIAGNOSIS — Z96.641 PRESENCE OF RIGHT ARTIFICIAL HIP JOINT: ICD-10-CM

## 2025-04-01 DIAGNOSIS — Z96.641 S/P HIP REPLACEMENT, RIGHT: Primary | ICD-10-CM

## 2025-04-01 LAB
BACTERIAL VAGINOSIS DNA (OHS): NOT DETECTED
CANDIDA GLABRATA/KRUSEI DNA (OHS): NOT DETECTED
CANDIDA SPECIES DNA (OHS): NOT DETECTED
CRP SERPL-MCNC: 9.5 MG/L
ERYTHROCYTE [SEDIMENTATION RATE] IN BLOOD BY PHOTOMETRIC METHOD: 64 MM/HR
TRICHOMONAS VAGINALIS DNA (OHS): NOT DETECTED

## 2025-04-01 PROCEDURE — 3066F NEPHROPATHY DOC TX: CPT | Mod: CPTII,S$GLB,,

## 2025-04-01 PROCEDURE — 85652 RBC SED RATE AUTOMATED: CPT

## 2025-04-01 PROCEDURE — 1160F RVW MEDS BY RX/DR IN RCRD: CPT | Mod: CPTII,S$GLB,,

## 2025-04-01 PROCEDURE — 3008F BODY MASS INDEX DOCD: CPT | Mod: CPTII,S$GLB,,

## 2025-04-01 PROCEDURE — 99213 OFFICE O/P EST LOW 20 MIN: CPT | Mod: S$GLB,,,

## 2025-04-01 PROCEDURE — 3061F NEG MICROALBUMINURIA REV: CPT | Mod: CPTII,S$GLB,,

## 2025-04-01 PROCEDURE — 1159F MED LIST DOCD IN RCRD: CPT | Mod: CPTII,S$GLB,,

## 2025-04-01 PROCEDURE — 1125F AMNT PAIN NOTED PAIN PRSNT: CPT | Mod: CPTII,S$GLB,,

## 2025-04-01 PROCEDURE — 36415 COLL VENOUS BLD VENIPUNCTURE: CPT

## 2025-04-01 PROCEDURE — 3044F HG A1C LEVEL LT 7.0%: CPT | Mod: CPTII,S$GLB,,

## 2025-04-01 PROCEDURE — 86140 C-REACTIVE PROTEIN: CPT

## 2025-04-01 PROCEDURE — 99999 PR PBB SHADOW E&M-EST. PATIENT-LVL III: CPT | Mod: PBBFAC,,,

## 2025-04-01 RX ORDER — METHYLPREDNISOLONE 4 MG/1
TABLET ORAL
Qty: 1 EACH | Refills: 0 | Status: SHIPPED | OUTPATIENT
Start: 2025-04-01

## 2025-04-01 NOTE — PROGRESS NOTES
SUBJECTIVE:     Chief Complaint & History of Present Illness:  History of Present Illness    CHIEF COMPLAINT:  - Right hip pain    HPI:  Ms. Bartlett presents for evaluation of right hip pain, present for approximately 9 months. She had a right hip replacement in 2018, which initially worked well without complications. The pain worsened after breaking her little toe on the same side on New Year's, causing her to favor that side. Pain is localized to the front of the hip, along the side, and across a portion of her back, with occasional radiation down her leg. She describes the pain as soreness and discomfort. She rates her current pain while indicating the hip area. She denies any swelling, redness, or warmth in the hip area.    Pain interferes with sleep and her ability to care for a two-year-old impacts her rest. Her mobility is affected, describing her gait as slightly altered when walking. She has been taking naproxen for pain relief but reports it is ineffective and causes drowsiness. She wears a support garment to help with her back.    She denies any complications with the initial hip replacement.    PREVIOUS TREATMENTS:  - PT exercises: She mentions doing chair exercises as instructed, but reports continued discomfort    MEDICATIONS:  - Naproxen: Ineffective, causes drowsiness  - Tylenol    SURGICAL HISTORY:  - Right hip replacement: 2018, initially worked well without complications    WORK STATUS:  - Taking care of a two-year-old child      ROS:  Constitutional: +excessive drowsiness  Musculoskeletal: +joint pain, -joint swelling, +back pain, +limb pain          Past Medical History:   Diagnosis Date    Avascular necrosis     Diabetes mellitus     Encounter for blood transfusion     Glaucoma     Graves disease     Hypertension     Other abnormal glucose     Pre-Diabetic    Sciatica        Past Surgical History:   Procedure Laterality Date    CATARACT EXTRACTION W/  INTRAOCULAR LENS IMPLANT Right 01/26/2023         CATARACT EXTRACTION W/  INTRAOCULAR LENS IMPLANT Left 09/08/2022        COLONOSCOPY N/A 03/27/2023    Procedure: COLONOSCOPY;  Surgeon: Yohannes Francois MD;  Location: [facility] ENDOSCOPY;  Service: Endoscopy;  Laterality: N/A;  instr via portal; AP  3/24 pre call complete, pt stated she would have a ride -CE    HIP ARTHROPLASTY Right 11/26/2018    Procedure: ARTHROPLASTY, HIP, Rac[place], peg board, first assist;  Surgeon: Olayinka Redding MD;  Location: [facility] OR;  Service: Orthopedics;  Laterality: Right;  RACIEL ORTHO CONFIRMED 11/20/DME FIRST ASSISTANT CONFIRMED 11/21/DME    HIP REPLACEMENT ARTHROPLASTY Right     INTRAOCULAR PROSTHESES INSERTION Left 09/08/2022    Procedure: INSERTION, IOL PROSTHESIS;  Surgeon: Soniya Macias MD;  [facility];  Service: Ophthalmology;  Laterality: Left;    INTRAOCULAR PROSTHESES INSERTION Right 01/26/2023    Procedure: INSERTION, IOL PROSTHESIS;  Surgeon: Soniya Macias MD;  [facility];  Service: Ophthalmology;  Laterality: Right;    PHACOEMULSIFICATION OF CATARACT Left 09/08/2022    Procedure: PHACOEMULSIFICATION, CATARACT;  Surgeon: Soniya Macias MD;  [facility];  Service: Ophthalmology;  Laterality: Left;    PHACOEMULSIFICATION OF CATARACT Right 01/26/2023    Procedure: PHACOEMULSIFICATION, CATARACT;  Surgeon: Soniya Macias MD;  [facility];  Service: Ophthalmology;  Laterality: Right;    TUBAL LIGATION         Family History   Problem Relation Name Age of Onset    Kidney failure Mother      Hypertension Mother          was on list to have kidney, lung, and heart transplant    Glaucoma Mother      Blindness Mother      Diabetes Mother      Stomach cancer Father      Breast cancer Sister  33        double mastectomy    Diabetes Sister      Heart attack Brother  40    Glaucoma Brother      Amblyopia Neg Hx      Cataracts Neg Hx      Macular degeneration Neg Hx      Retinal detachment Neg  "Hx      Strabismus Neg Hx         Review of patient's allergies indicates:  No Known Allergies      Current Medications[1]      OBJECTIVE:     PHYSICAL EXAM:  Ht 5' 3" (1.6 m)   Wt 100.7 kg (221 lb 14.3 oz)   BMI 39.31 kg/m²   General: Pleasant, cooperative, NAD.  HEENT: NCAT, sclera nonicteric.  Lungs: Respirations are equal and unlabored.   Abdomen: Soft and non-tender.  CV: 2+ bilateral upper and lower extremity pulses.  Neuro: Sensation intact to light touch.  Skin: Intact throughout LE with no rashes, erythema, or lesions.  Extremities: No LE edema, NVI lower extremities. antalgic gait.    right Hip Exam:  TTP: Greater trochanter, Anterior, and Posterior  110 degrees flexion  0 degrees extension   20 degrees internal rotation  30 degrees external rotation  30 degrees abduction  30 degrees adduction   0 flexion contracture   negative ADILENE   negative FADIR    RADIOGRAPHS:  X-rays of the right hip taken previously were personally reviewed. Imaging reveals no acute fractures or dislocations. R BILLY in satisfactory alignment. Subtle lucency around distal stem as previously mentioned by Dr. Waddell.      ASSESSMENT:       ICD-10-CM ICD-9-CM   1. S/P hip replacement, right  Z96.641 V43.64   2. Pain of right hip  M25.551 719.45   3. Presence of right artificial hip joint  Z96.641 V43.64       PLAN:     Assessment & Plan    - Suspicion low but ESR and CRP to to rule out infection.  - If elevated, educated patient on possible joint aspiration to evaluate for prosthetic joint infection.   - MRI Right Hip to evaluate for component loosening.  - Medrol Dosepak in an attempt to reduce acute inflammation.   - Follow up after completing lab work and MRI.    - MRI lumbar spine relatively unremarkable.   - May have component of greater trochanteric bursitis/gluteal tendonitis. Can consider bursal injection for diagnostic/therapeutic purposes pending PJI/mechanical loosening workup.     - F/u apt scheduled with Dr. Waddell on " 4/10.          This note was generated with the assistance of ambient listening technology. Verbal consent was obtained by the patient and accompanying visitor(s) for the recording of patient appointment to facilitate this note. I attest to having reviewed and edited the generated note for accuracy, though some syntax or spelling errors may persist. Please contact the author of this note for any clarification.         Kaleb Montague PA-C       [1]   Current Outpatient Medications:     albuterol (PROVENTIL/VENTOLIN HFA) 90 mcg/actuation inhaler, INHALE 2 PUFFS INTO THE LUNGS EVERY 6 (SIX) HOURS AS NEEDED FOR WHEEZING. RESCUE, Disp: 18 g, Rfl: 0    atorvastatin (LIPITOR) 10 MG tablet, Take 2 tablets (20 mg total) by mouth once daily., Disp: , Rfl:     cycloSPORINE (VEVYE) 0.1 % Drop, Place 1 drop into both eyes 2 (two) times a day., Disp: 2 mL, Rfl: 11    diclofenac sodium (VOLTAREN ARTHRITIS PAIN) 1 % Gel, Apply 2 g topically once daily., Disp: 100 each, Rfl: 1    latanoprost 0.005 % ophthalmic solution, 1 drop., Disp: , Rfl:     latanoprost, PF, (IYUZEH) 0.005 % Dpet, Place 1 drop into both eyes every evening., Disp: 30 each, Rfl: 6    levothyroxine (SYNTHROID) 75 MCG tablet, Take 1 tablet (75 mcg total) by mouth before breakfast., Disp: 30 tablet, Rfl: 11    metFORMIN (GLUMETZA) 1000 MG (MOD) 24hr tablet, Take 1 tablet (1,000 mg total) by mouth daily with breakfast., Disp: 90 tablet, Rfl: 1    mometasone (ELOCON) 0.1 % ointment, aaa bid. Not more than 2 weeks straight in same location. Avoid use on face and groin, Disp: 60 g, Rfl: 1    valsartan-hydrochlorothiazide (DIOVAN-HCT) 160-12.5 mg per tablet, TAKE 1 TABLET BY MOUTH EVERY DAY, Disp: 90 tablet, Rfl: 1    dorzolamide-timolol, PF, (COSOPT PF) 2-0.5 % Dpet ophthalmic solution, Place 1 drop into both eyes 2 (two) times daily., Disp: 60 each, Rfl: 6    methylPREDNISolone (MEDROL DOSEPACK) 4 mg tablet, use as directed, Disp: 1 each, Rfl: 0

## 2025-04-02 ENCOUNTER — RESULTS FOLLOW-UP (OUTPATIENT)
Dept: OBSTETRICS AND GYNECOLOGY | Facility: CLINIC | Age: 70
End: 2025-04-02

## 2025-04-02 LAB
INSULIN SERPL-ACNC: NORMAL U[IU]/ML
LAB AP BETHESDA CATEGORY: NORMAL
LAB AP CLINICAL FINDINGS: NORMAL
LAB AP CONTRACEPTIVES: NORMAL
LAB AP LMP DATE: NORMAL
LAB AP OCHS PAP SPECIMEN ADEQUACY: NORMAL
LAB AP OHS PAP INTERPRETATION: NORMAL
LAB AP PAP DISCLAIMER COMMENTS: NORMAL
LAB AP PAP ESTROGEN REPLACEMENT THERAPY: NORMAL
LAB AP PAP PMP: NORMAL
LAB AP PAP PREVIOUS BX: NORMAL
LAB AP PAP PRIOR TREATMENT: NORMAL
LAB AP PERFORMING LOCATION(S): NORMAL

## 2025-04-03 DIAGNOSIS — Z96.641 S/P HIP REPLACEMENT, RIGHT: Primary | ICD-10-CM

## 2025-04-04 LAB
HPV DNA, HIGH RISK TYPE 16, PCR (OHS): NEGATIVE
HPV DNA, HIGH RISK TYPE 18, PCR (OHS): NEGATIVE
HPV DNA, HIGH RISK TYPE OTHER, PCR (OHS): NEGATIVE

## 2025-04-06 ENCOUNTER — HOSPITAL ENCOUNTER (OUTPATIENT)
Dept: RADIOLOGY | Facility: HOSPITAL | Age: 70
Discharge: HOME | End: 2025-04-06
Payer: MEDICARE

## 2025-04-06 DIAGNOSIS — Z96.641 S/P HIP REPLACEMENT, RIGHT: ICD-10-CM

## 2025-04-06 PROCEDURE — 73721 MRI JNT OF LWR EXTRE W/O DYE: CPT | Mod: TC,RT

## 2025-04-06 PROCEDURE — 73721 MRI JNT OF LWR EXTRE W/O DYE: CPT | Mod: 26,RT,, | Performed by: RADIOLOGY

## 2025-04-08 ENCOUNTER — LAB VISIT (OUTPATIENT)
Dept: LAB | Facility: HOSPITAL | Age: 70
End: 2025-04-08
Payer: MEDICARE

## 2025-04-08 DIAGNOSIS — Z96.641 S/P HIP REPLACEMENT, RIGHT: ICD-10-CM

## 2025-04-08 LAB
CRP SERPL-MCNC: 2.3 MG/L
ERYTHROCYTE [SEDIMENTATION RATE] IN BLOOD BY PHOTOMETRIC METHOD: 49 MM/HR

## 2025-04-08 PROCEDURE — 86140 C-REACTIVE PROTEIN: CPT

## 2025-04-08 PROCEDURE — 85652 RBC SED RATE AUTOMATED: CPT

## 2025-04-08 PROCEDURE — 36415 COLL VENOUS BLD VENIPUNCTURE: CPT

## 2025-04-09 ENCOUNTER — TELEPHONE (OUTPATIENT)
Dept: ORTHOPEDICS | Facility: CLINIC | Age: 70
End: 2025-04-09
Payer: MEDICARE

## 2025-04-10 ENCOUNTER — TELEPHONE (OUTPATIENT)
Dept: ORTHOPEDICS | Facility: CLINIC | Age: 70
End: 2025-04-10
Payer: MEDICARE

## 2025-04-10 ENCOUNTER — PATIENT MESSAGE (OUTPATIENT)
Dept: ORTHOPEDICS | Facility: HOSPITAL | Age: 70
End: 2025-04-10
Payer: MEDICARE

## 2025-04-10 DIAGNOSIS — Z96.641 S/P HIP REPLACEMENT, RIGHT: Primary | ICD-10-CM

## 2025-04-10 NOTE — TELEPHONE ENCOUNTER
Pt informed that she has an appointment scheduled with Dr. Avalos and that she has orders in for labs. Pt verbalized understanding and will go get labs done today.   ----- Message from Brando sent at 4/10/2025  9:26 AM CDT -----  Regarding: Provider Change  Contact: [phone]  Pt is calling in ref to speaking with provider she says she is in pain and not very happy about the last minute change. She has to pay for transportation.   Pt did not want me to reschedule wants nurse to do it so she can get something sooner than 5/4. Patient Requesting Call Back @  [phone]  ----- Message -----  From: Brando Mehta  Sent: 4/10/2025   9:31 AM CDT  To: [facility] Staff  Subject: Provider Change                                  Pt is calling in ref to speaking with provider she says she is in pain and not very happy about the last minute change. She has to pay for transportation.   Pt did not want me to reschedule wants nurse to do it so she can get something sooner than 5/4. Patient Requesting Call Back @  [phone]

## 2025-04-10 NOTE — TELEPHONE ENCOUNTER
Spoke to pt and r/s appt with dr romero 2 wks after aspiration and pt will get labs done too----- Message from Bill Romero MD sent at 4/10/2025  7:41 AM CDT -----  Regarding: RE: R BILLY aseptic loosening  Looks like an Accolade 1 stem, notorious for trunnion failure. Please order cobalt/chromium/titanium levels and US guided aspiration for Synovasure.    Baril- can you schedule her to see me in 2-3 weeks, after the above.     Thanks!   -Bill  ----- Message -----  From: Kaleb Montague PA-C  Sent: 4/10/2025   8:06 AM EDT  To: Bill Romero MD; Zonia Waddell MD  Subject: R BILLY aseptic loosening                          Good morning Dr. Romero, Dr. Waddell and I have been working up this patient for possible loosening of her R BILLY. I do see the subtle lucency on xray at her distal stem. Could you take a look at her MRI and let us know if you see anything concerning? Thanks, Kaleb Montague PA-C  ----- Message -----  From: Zonia Waddell MD  Sent: 4/9/2025   3:23 PM CDT  To: DAKSHA Torres,I am seeing Ms Zarco tomorrow. Can you take a look at her MRI to see if you think that it is loose? 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 It looked to me like there was a small halo around the implant distally, but the radiologist read it as negative and I don't usually look at MRIs of implants, so maybe that's not concerning. Thanks!

## 2025-04-16 ENCOUNTER — OFFICE VISIT (OUTPATIENT)
Dept: OPHTHALMOLOGY | Facility: CLINIC | Age: 70
End: 2025-04-16
Payer: MEDICARE

## 2025-04-16 ENCOUNTER — CLINICAL SUPPORT (OUTPATIENT)
Dept: OPHTHALMOLOGY | Facility: CLINIC | Age: 70
End: 2025-04-16
Payer: MEDICARE

## 2025-04-16 ENCOUNTER — LAB VISIT (OUTPATIENT)
Dept: LAB | Facility: HOSPITAL | Age: 70
End: 2025-04-16
Payer: MEDICARE

## 2025-04-16 DIAGNOSIS — G51.31 HEMIFACIAL SPASM OF RIGHT SIDE OF FACE: ICD-10-CM

## 2025-04-16 DIAGNOSIS — Z96.1 PSEUDOPHAKIA, BOTH EYES: ICD-10-CM

## 2025-04-16 DIAGNOSIS — H40.53X3 SEVERE STAGE SECONDARY GLAUCOMA OF BOTH EYES DUE TO COMBINATION MECHANISMS: Primary | ICD-10-CM

## 2025-04-16 DIAGNOSIS — H04.123 DRY EYE SYNDROME OF BOTH LACRIMAL GLANDS: ICD-10-CM

## 2025-04-16 DIAGNOSIS — H02.403 PTOSIS, BILATERAL: ICD-10-CM

## 2025-04-16 DIAGNOSIS — Z96.641 S/P HIP REPLACEMENT, RIGHT: ICD-10-CM

## 2025-04-16 PROCEDURE — 36415 COLL VENOUS BLD VENIPUNCTURE: CPT

## 2025-04-16 PROCEDURE — 3066F NEPHROPATHY DOC TX: CPT | Mod: CPTII,S$GLB,, | Performed by: STUDENT IN AN ORGANIZED HEALTH CARE EDUCATION/TRAINING PROGRAM

## 2025-04-16 PROCEDURE — 82495 ASSAY OF CHROMIUM: CPT

## 2025-04-16 PROCEDURE — G2211 COMPLEX E/M VISIT ADD ON: HCPCS | Mod: S$GLB,,, | Performed by: STUDENT IN AN ORGANIZED HEALTH CARE EDUCATION/TRAINING PROGRAM

## 2025-04-16 PROCEDURE — 99214 OFFICE O/P EST MOD 30 MIN: CPT | Mod: S$GLB,,, | Performed by: STUDENT IN AN ORGANIZED HEALTH CARE EDUCATION/TRAINING PROGRAM

## 2025-04-16 PROCEDURE — 83018 HEAVY METAL QUAN EACH NES: CPT

## 2025-04-16 PROCEDURE — 3044F HG A1C LEVEL LT 7.0%: CPT | Mod: CPTII,S$GLB,, | Performed by: STUDENT IN AN ORGANIZED HEALTH CARE EDUCATION/TRAINING PROGRAM

## 2025-04-16 PROCEDURE — 3061F NEG MICROALBUMINURIA REV: CPT | Mod: CPTII,S$GLB,, | Performed by: STUDENT IN AN ORGANIZED HEALTH CARE EDUCATION/TRAINING PROGRAM

## 2025-04-16 PROCEDURE — 99999 PR PBB SHADOW E&M-EST. PATIENT-LVL I: CPT | Mod: PBBFAC,,, | Performed by: STUDENT IN AN ORGANIZED HEALTH CARE EDUCATION/TRAINING PROGRAM

## 2025-04-16 PROCEDURE — 92083 EXTENDED VISUAL FIELD XM: CPT | Mod: S$GLB,,, | Performed by: STUDENT IN AN ORGANIZED HEALTH CARE EDUCATION/TRAINING PROGRAM

## 2025-04-16 PROCEDURE — 82300 ASSAY OF CADMIUM: CPT

## 2025-04-16 RX ORDER — PROPYLENE GLYCOL 0.6 G/100ML
1 LIQUID OPHTHALMIC 2 TIMES DAILY
Qty: 20 ML | Refills: 11 | Status: SHIPPED | OUTPATIENT
Start: 2025-04-16

## 2025-04-16 NOTE — PROGRESS NOTES
HVF done ou./ Rel/fix/coop. Good ou./chart checked for latex allergy./ .75+ .75x95/od plano +1.00 x65/os-smh

## 2025-04-16 NOTE — PROGRESS NOTES
Subjective:  HPI    DLS: 12/17/2024    Pt here for HVF review;  Pt states she's been having dryness to her eyes and have cold in her eyes   and her eyes do itch. Pt states she does read a lot pretty much all day   long.     Meds:  Restasis BID OU  Systane uses all day long OU  Latanoprost QHS OU  Dorzolamide-Timolol 2-3 X DAY OU    Last edited by Camila Ramos MA on 4/16/2025  9:30 AM.        Exam:  See encounter report for full exam    Assessment:  1. 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 Macias patient, establishing 8/2024  - Surg hx: phaco/IOL OU   - Laser hx: none  - Glaucoma FHx: mother, brother  -  / 640  - Gonio: SS/pseudophakic (Coulon 8/2024)  - Tmax: 21/23  - Target IOP: <13  - Med adverse effects: n/a    Imaging reviewed: HVF with SNS/INS OU. OCT thinning OU.    Last:  HVF: mod reliable, SNS, inf depression, VFI 92 OD  poorly reliable, nasal depression, VFI 84 -- improved c/w last/poorly reliable  OCT: IT thinning, avg 81 OD  sup/inf thinning, avg 65 -- first BMO scan    04/16/2025  IOP okay for now, on unclear regimen  HVF: mod reliable, SNS, inf depression, VFI 94 OD  reliable, scattered depression, VFI 73 -- fluctuates/difficult to interpret/poor test taker    2. Dry eye syndrome of both lacrimal glands  Hx Lupus  Started Xiidra 2/21/22 --> failed --> restasis (sent 9/1/23) --> Improved but still miserable--> continue but try to get Meibo 4/2025  Pres free AT's about 6 x day   Gel drops or ointment at night   Consider punctal plugs / doxy PRN  Anti-SSA / SSB negative    3. Pseudophakia, both eyes  Lenses KIA Macias  OD DIBOO  +21.00 target -0.13   OS DIBOO +21.50 target -0.05      4. Ptosis, bilateral  5. 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 Burden     Plan:  IOP adequate for now. HVF poorly reliable again- poor test taker.  - Continue Latanoprost qhs/qhs, Cosopt 2/2 (states not taking other PF drops); asked to bring drops to every  appt  - Dr. Martinez for MRx mgmt  - Trial Meibo BID OU to replace Restasis BID OU  - Continue PFATs QID OU    Today's visit is associated with current and anticipated ongoing medical care related to this patient's single serious/complex condition (glaucoma). Follow up is to be continued indefinitely to monitor the condition.    Return 5 months -- OCT, VA, IOP    Susie Nieves MD  [facility], Glaucoma

## 2025-04-18 LAB
ADDRESS: NORMAL
ARSENIC BLD-MCNC: 3 NG/ML
ATTENDING PHYSICIAN NAME: NORMAL
CADMIUM BLD-MCNC: 2.2 NG/ML
COBALT SERPL-MCNC: 0.3 NG/ML
COUNTY OF RESIDENCE: NORMAL
CR SERPL-MCNC: 0.3 NG/ML
EMPLOYER NAME: NORMAL
FACILITY PHONE #: NORMAL
HX OF OCCUPATION: NORMAL
LEAD BLDV-MCNC: <1 MCG/DL
M HEALTH CARE PROVIDER PHONE: NORMAL
M PATIENT CITY: NORMAL
MERCURY BLD-MCNC: 1 NG/ML
PHONE #: NORMAL
PROVIDER CITY: NORMAL
PROVIDER POSTAL CODE: NORMAL
PROVIDER STATE: NORMAL
REFER PHYSICIAN ADDR: NORMAL
SPECIMEN SOURCE: NORMAL

## 2025-04-23 LAB — MAYO GENERIC ORDERABLE RESULT: NORMAL

## 2025-04-24 NOTE — PROGRESS NOTES
Chief Complaint: Well Woman Exam, Vaginal discharge     HPI:      Carolee Bartlett is a 70 y.o.  who presents for annual exam as new GYN patient. Postmenopausal.  Today patient GYN complaints include: vaginal discharge and odor . Recently started using new soap. Tried warm bath and douching. No other complaints. Denies itching.  Specifically, patient denies vaginal bleeding, pelvic pain, urinary problems.    Ms. Bartlett is not currently sexually active.    Previous Pap: reports many years ago; denies history of abnormal paps  Previous Mammogram: BiRads: 1 T-C Score: 11% (2/3/25)  Most Recent Dexa: Osteopenia (10/2024), Repeat in   Most Recent Colonoscopy: Benign polyps (), Repeat in     Past Medical History:   Diagnosis Date    Avascular necrosis     Diabetes mellitus     Encounter for blood transfusion     Glaucoma     Graves disease     Hypertension     Other abnormal glucose     Pre-Diabetic    Sciatica        Past Surgical History:   Procedure Laterality Date    CATARACT EXTRACTION W/  INTRAOCULAR LENS IMPLANT Right 2023        CATARACT EXTRACTION W/  INTRAOCULAR LENS IMPLANT Left 2022        COLONOSCOPY N/A 2023    Procedure: COLONOSCOPY;  Surgeon: Yohannes Francois MD;  Location: [facility] END[facility];  Service: Endoscopy;  Laterality: N/A;  instr via portal; AP  3/24 pre call complete, pt stated she would have a ride -CE    HIP ARTHROPLASTY Right 2018    Procedure: ARTHROPLASTY, HIP, Raciel, peg board, first assist;  Surgeon: Olayinka Redding MD;  Location: [facility] OR;  Service: Orthopedics;  Laterality: Right;  RACIEL ORTHO CONFIRMED  FIRST ASSISTANT CONFIRMED     HIP REPLACEMENT ARTHROPLASTY Right     INTRAOCULAR PROSTHESES INSERTION Left 2022    Procedure: INSERTION, IOL PROSTHESIS;  Surgeon: Soniya Macias MD;  Location: [address];  Service: Ophthalmology;  Laterality: Left;    INTRAOCULAR PROSTHESES INSERTION  "Right 2023    Procedure: INSERTION, IOL PROSTHESIS;  Surgeon: Soniya Macias MD;  Location: [facility] OR [address] [place] [address];  Service: Ophthalmology;  Laterality: Right;    PHACOEMULSIFICATION OF CATARACT Left 2022    Procedure: PHACOEMULSIFICATION, CATARACT;  Surgeon: Soniya Macias MD;  Location: [facility] OR [address] [place] [address];  Service: Ophthalmology;  Laterality: Left;    PHACOEMULSIFICATION OF CATARACT Right 2023    Procedure: PHACOEMULSIFICATION, CATARACT;  Surgeon: Soniya Macias MD;  Location: [facility] OR [address] [place] [address];  Service: Ophthalmology;  Laterality: Right;    TUBAL LIGATION         Social History[1]    Family History   Problem Relation Name Age of Onset    Kidney failure Mother      Hypertension Mother          was on list to have kidney, lung, and heart transplant    Glaucoma Mother      Blindness Mother      Diabetes Mother      Stomach cancer Father      Breast cancer Sister  33        double mastectomy    Diabetes Sister      Heart attack Brother  40    Glaucoma Brother      Amblyopia Neg Hx      Cataracts Neg Hx      Macular degeneration Neg Hx      Retinal detachment Neg Hx      Strabismus Neg Hx         Review of patient's allergies indicates:  No Known Allergies    OB History          1    Para   1    Term   1            AB        Living             SAB        IAB        Ectopic        Multiple        Live Births                     Physical Exam:      PHYSICAL EXAM:  /68   Ht 5' 3" (1.6 m)   Wt 99.6 kg (219 lb 9.3 oz)   BMI 38.90 kg/m²   Body mass index is 38.9 kg/m².     APPEARANCE: Well nourished, well developed, in no acute distress.  PSYCH: Appropriate mood and affect.  SKIN: No acne or hirsutism  NECK: Neck symmetric without masses or thyromegaly  NODES: No inguinal, axillary, or supraclavicular lymph node enlargement  CHEST: Normal respiratory effort.  ABDOMEN: Soft.  No tenderness or masses.   BREASTS: Symmetrical, no visible skin lesions. No palpable masses. No nipple discharge " bilaterally.  PELVIC: Normal external genitalia without lesions.  Normal hair distribution.  Adequate perineal body, normal urethral meatus.  Vagina  atrophic . Without lesions. With thin discharge.  Cervix pink, without lesions, discharge or tenderness.  No significant cystocele or rectocele.  Bimanual exam shows uterus to be normal size, regular, mobile and nontender.  Adnexa without masses or tenderness.      Assessment/Plan:     Encounter for gynecological examination    Pap smear for cervical cancer screening  -     Liquid-Based Pap Smear, Screening  -     HPV High Risk Genotypes, PCR    Screening for HPV (human papillomavirus)  -     Liquid-Based Pap Smear, Screening  -     HPV High Risk Genotypes, PCR    Vaginal discharge  -     Vaginosis Screen by DNA Probe    - pelvic exam normal for age with discharge - affirm collected  - pap/hpv due and collected  - MMG due 2026  - other preventative care with PCP  - RTC 1 yr or sooner prn      Counseling:     Patient was counseled today on current ASCCP pap guidelines, the recommendation for yearly physical exams, safe driving habits, breast self awareness and annual mammograms. She is to see her PCP for other health maintenance.       Use of the [facility] Patient Portal discussed and encouraged during today's visit.          [1]   Social History  Socioeconomic History    Marital status:     Number of children: 1   Occupational History     Comment: volunteer at the school    Tobacco Use    Smoking status: Former     Current packs/day: 0.00     Average packs/day: 0.3 packs/day for 40.0 years (10.0 ttl pk-yrs)     Types: Cigarettes     Quit date: 2024     Years since quittin.6    Smokeless tobacco: Never   Substance and Sexual Activity    Alcohol use: No    Drug use: No     Social Drivers of Health     Financial Resource Strain: Low Risk  (2025)    Overall Financial Resource Strain (CARDIA)     Difficulty of Paying Living Expenses: Not hard at all    Food Insecurity: No Food Insecurity (2/20/2025)    Hunger Vital Sign     Worried About Running Out of Food in the Last Year: Never true     Ran Out of Food in the Last Year: Never true   Recent Concern: Food Insecurity - High Risk (2/6/2025)    Received from [facility] SDOH Screening     In the past year have you or any family members you live with been unable to get any of the following when it was really needed?  check all that apply: Foodutilities   Transportation Needs: No Transportation Needs (2/20/2025)    PRAPARE - Transportation     Lack of Transportation (Medical): No     Lack of Transportation (Non-Medical): No   Physical Activity: Unknown (2/20/2025)    Exercise Vital Sign     Days of Exercise per Week: 1 day     Minutes of Exercise per Session: Patient unable to answer   Stress: No Stress Concern Present (2/20/2025)    [employer] [place] [employer] - Occupational Stress Questionnaire     Feeling of Stress : Not at all   Housing Stability: Low Risk  (2/20/2025)    Housing Stability Vital Sign     Unable to Pay for Housing in the Last Year: No     Homeless in the Last Year: No

## 2025-05-12 ENCOUNTER — OFFICE VISIT (OUTPATIENT)
Dept: OPTOMETRY | Facility: CLINIC | Age: 70
End: 2025-05-12
Payer: COMMERCIAL

## 2025-05-12 DIAGNOSIS — H16.223 KERATOCONJUNCTIVITIS SICCA, NOT SPECIFIED AS SJOGREN'S, BILATERAL: Primary | ICD-10-CM

## 2025-05-12 DIAGNOSIS — H40.1134 PRIMARY OPEN ANGLE GLAUCOMA (POAG) OF BOTH EYES, INDETERMINATE STAGE: ICD-10-CM

## 2025-05-12 DIAGNOSIS — E11.9 CONTROLLED TYPE 2 DIABETES MELLITUS WITHOUT COMPLICATION, WITHOUT LONG-TERM CURRENT USE OF INSULIN: ICD-10-CM

## 2025-05-12 PROCEDURE — 99999 PR PBB SHADOW E&M-EST. PATIENT-LVL II: CPT | Mod: PBBFAC,,, | Performed by: OPTOMETRIST

## 2025-05-12 PROCEDURE — 99214 OFFICE O/P EST MOD 30 MIN: CPT | Mod: S$GLB,,, | Performed by: OPTOMETRIST

## 2025-05-12 PROCEDURE — G2211 COMPLEX E/M VISIT ADD ON: HCPCS | Mod: S$GLB,,, | Performed by: OPTOMETRIST

## 2025-05-12 RX ORDER — PERFLUOROHEXYLOCTANE 1 MG/MG
1 SOLUTION OPHTHALMIC 2 TIMES DAILY
Qty: 3 ML | Refills: 11 | Status: SHIPPED | OUTPATIENT
Start: 2025-05-12 | End: 2026-05-12

## 2025-05-12 NOTE — PROGRESS NOTES
HPI    CC: VA OU good. Pt suffers with dry eyes a lot. Pt can't find Meibo at the   store.     NAT: 10/28/2024    (-) Changes in vision   (-) Pain  (+) Irritation when dry  (+) Itching when dry  (-) Flashes  (-) Floaters  (+) Glasses wearer  (-) CL wearer  (+) Uses eye gtts Latanoprost HS OU  Cosopt QD OU- per pt (reinstructed BID)    Does patient want a refraction today? No glasses are recent    (-) Eye injury  (+) Eye surgery Cat surgery  (+)POHx : (+)PRASHANTH OU (+)ptosis RUL (+)cataracts OU (+)blepharitis OU  (+)FOHx: (+)glaucoma - brother (+)cataracts - mother, sister. brother    (+)DM       Last edited by Juju Martinez, OD on 5/12/2025  1:09 PM.            Assessment /Plan     For exam results, see Encounter Report.    Keratoconjunctivitis sicca, not specified as Sjogren's, bilateral  -     perfluorohexyloctane, PF, (MIEBO, PF,) 100 % Drop; Place 1 drop into both eyes 2 (two) times a day.  Dispense: 3 mL; Refill: 11    Primary open angle glaucoma (POAG) of both eyes, indeterminate stage    Controlled type 2 diabetes mellitus without complication, without long-term current use of insulin  -     Ambulatory referral/consult to Optometry      DRY EYE FOLLOW-UP    NAT: 10/24/2024 with me (04/16/2025, with Dr. Nieves)  Current Treatment: Pt reports using vevye 1 gtt OU BID + systane prn   Current Symptoms: Dry irritated / itching eyes  Failed Treatment: Xiidra, Restasis   Why? Minimal dry eye improvement    Educated pt on findings. Recommend to continue vevye 1 gtt OU BID. Also Rx miebo 1 gtt OU BID-QID. If symptoms worsen or dont improve, RTC.     Pt followed by Dr. Nieves regularly. Review proper gtt use: latanoprost 1 gtt OU QHS and Cosopt 1 gtt OU BID. Monitor as directed.     Visit today included increased complexity associated with the care of the pt's ocular surface disease and managing the longitudinal care of the patient due to the serious and/or complex managed problem.        RTC with me prn, with   Lizbeth as directed.

## 2025-05-13 ENCOUNTER — OFFICE VISIT (OUTPATIENT)
Dept: SPORTS MEDICINE | Facility: CLINIC | Age: 70
End: 2025-05-13
Payer: MEDICARE

## 2025-05-13 VITALS
HEART RATE: 60 BPM | SYSTOLIC BLOOD PRESSURE: 96 MMHG | DIASTOLIC BLOOD PRESSURE: 60 MMHG | BODY MASS INDEX: 39.36 KG/M2 | HEIGHT: 63 IN | WEIGHT: 222.13 LBS

## 2025-05-13 DIAGNOSIS — Z96.641 STATUS POST TOTAL HIP REPLACEMENT, RIGHT: ICD-10-CM

## 2025-05-13 DIAGNOSIS — M25.551 CHRONIC RIGHT HIP PAIN: Primary | ICD-10-CM

## 2025-05-13 DIAGNOSIS — Z96.641 S/P HIP REPLACEMENT, RIGHT: ICD-10-CM

## 2025-05-13 DIAGNOSIS — G89.29 CHRONIC RIGHT HIP PAIN: Primary | ICD-10-CM

## 2025-05-13 PROCEDURE — 99999 PR PBB SHADOW E&M-EST. PATIENT-LVL IV: CPT | Mod: PBBFAC,,, | Performed by: FAMILY MEDICINE

## 2025-05-13 NOTE — PROCEDURES
"Large Joint Aspiration/Injection: R hip joint    Date/Time: 5/13/2025 10:45 AM    Performed by: Franco Lea MD  Authorized by: Franco Lea MD    Consent Done?:  Yes (Verbal)  Indications:  Pain  Site marked: the procedure site was marked    Timeout: prior to procedure the correct patient, procedure, and site was verified    Prep: patient was prepped and draped in usual sterile fashion    Local anesthesia used?: No      Details:  Needle Size:  18 G  Ultrasonic Guidance for needle placement?: Yes    Images are saved and documented.  Approach:  Anterior  Location:  Hip  Site:  R hip joint  Aspirate amount (mL):  0  Patient tolerance:  Patient tolerated the procedure well with no immediate complications     Description of ultrasound utilization for needle guidance:   Ultrasound guidance used for needle localization. Images saved and stored for documentation. The hip joint was visualized. Dynamic visualization of the 18g x 3.5" needle was continuous throughout the procedure.    Precautionary:  The patient's femoral artery, vein, and nerve were identified, and visualized throughout the procedure, so as to avoid needle contact with those structures.     "

## 2025-05-13 NOTE — PROGRESS NOTES
"Attempted aspiration of RIGHT BILLY re: chronic left hip symptoms  No evidence of joint effusion with ultrasound imaging  Confirmed with attempted aspiration with 3.5" 18g needle    "

## 2025-05-19 NOTE — PROGRESS NOTES
Assessment:  Encounter Diagnoses   Name Primary?    S/P hip replacement, right     Greater trochanteric bursitis of right hip Yes       Plan:  - Diagnostic injection administered to side of hip at trochanteric bursa- after 10-15 minutes patient reported 99% relief.  - Can do steroid injection in future given relief of diagnostic injection  - We discussed the small infection risk associated with steroid injections near hip replacements.   - Patient will f/u at her convenience for tro[facility] b[facility]       Patient ID: Carolee Bartlett is a 70 y.o. female.  Chief Complaint   Patient presents with    Right Hip - Pain        History of Present Illness    CHIEF COMPLAINT:  - Left hip pain    HPI:  Ms. Bartlett presents for evaluation of left hip pain following a left total hip arthroplasty performed in 2017 with Dr. Redding. Initially, the surgery was successful with no complications or issues with wound healing. Approximately two years ago, she began experiencing gradual onset pain in the left hip, with no specific inciting event. Pain has progressively worsened over time.    She reports pain throughout the hip, including the front, back, and outside. Sharp pain and numbness/tingling occur in the back of the leg, extending down to the knee and occasionally beyond. She has difficulty sitting or standing for prolonged periods, experiencing frequent need to change positions throughout the night. Pain affects her ability to care for her 2-year-old child, limiting her mobility.    She reports left hip instability, feeling like it might give out. She has difficulty with activities such as putting on shoes and socks. She has a cane but tries not to use it. She notes a long-standing swelling on her leg that has been XRed.    She had PT about a year ago but stopped due to family circumstances. Recent imaging includes an MRI in April and XRs in March. Lab work was performed, but an attempted hip aspiration  "was unsuccessful in obtaining fluid.    She denies any history of heart attack, stroke, or blood clots.    PREVIOUS TREATMENTS:  - PT: Approximately one year ago, discontinued due to family circumstances    SURGICAL HISTORY:  - Left total hip arthroplasty: 2017, initially successful with no complications or issues with wound healing    WORK STATUS:  - Caring for a 2-year-old child  - Experiencing difficulty in  activities due to hip pain          Surgical History: R posterior BILLY on 11/26/2018 by Dr. Art Redding  Raciel Trident2 Tritanium acetabular cup size 50mm   Raciel Accolade II femoral stem size 1   [place] 36mm +7.5  ceramic head   25mm Raciel acetabular screw     Immediate Complications: none - pain returned 2 years ago. Hx of a fall in her home around this time. Appreciates numbness/tingling down the entire posterior leg    Feelings of instability?: yes - denies dislocation event     Hip pain is worsened by walking distances, sitting for prolonged time, sit-to stand, putting on shoes.     Conservative Treatments:     Assistive Device: occasional cane use    Physical Therapy: previously, in 2023    Infection Workup:   ESR/CRP - ESR: 49, CRP: 2.3 mg/L    Aspiration - attempted on 5/13/25 by Dr. Lea but only obtained a " few drops of clear yellow fluid" (not sent to lab due to insufficient sample size   Metal levels - WNL, see below    Denies Hx blood clot, heart attack, stroke, and not on blood thinners.  No Hx fragility fracture or osteoporosis   Other prior surgeries on the back/hip/knee/leg: none     Due to the ongoing issue with hip pain following replacement, the patient presents today for evaluation.    Past Medical History:  Past Medical History:   Diagnosis Date    Avascular necrosis     Diabetes mellitus     Encounter for blood transfusion     Glaucoma     Graves disease     Hypertension     Other abnormal glucose     Pre-Diabetic    Sciatica         Surgical History:  Past " Surgical History:   Procedure Laterality Date    CATARACT EXTRACTION W/  INTRAOCULAR LENS IMPLANT Right 01/26/2023        CATARACT EXTRACTION W/  INTRAOCULAR LENS IMPLANT Left 09/08/2022        COLONOSCOPY N/A 03/27/2023    Procedure: COLONOSCOPY;  Surgeon: Yohannes Francois MD;  Location: [facility] ENDOSCOPY;  Service: Endoscopy;  Laterality: N/A;  instr via portal; AP  3/24 pre call complete, pt stated she would have a ride -CE    HIP ARTHROPLASTY Right 11/26/2018    Procedure: ARTHROPLASTY, HIP, Raciel, peg board, first assist;  Surgeon: Olayinka Redding MD;  Location: [facility] OR;  Service: Orthopedics;  Laterality: Right;  RACIEL ORTHO CONFIRMED 11/20/DME FIRST ASSISTANT CONFIRMED 11/21/DME    HIP REPLACEMENT ARTHROPLASTY Right     INTRAOCULAR PROSTHESES INSERTION Left 09/08/2022    Procedure: INSERTION, IOL PROSTHESIS;  Surgeon: Soniya Macias MD;  Location: [facility] OR [address] [place] [address];  Service: Ophthalmology;  Laterality: Left;    INTRAOCULAR PROSTHESES INSERTION Right 01/26/2023    Procedure: INSERTION, IOL PROSTHESIS;  Surgeon: Soniya Macias MD;  [facility];  Service: Ophthalmology;  Laterality: Right;    PHACOEMULSIFICATION OF CATARACT Left 09/08/2022    Procedure: PHACOEMULSIFICATION, CATARACT;  Surgeon: Soniya Macias MD;  [facility];  Service: Ophthalmology;  Laterality: Left;    PHACOEMULSIFICATION OF CATARACT Right 01/26/2023    Procedure: PHACOEMULSIFICATION, CATARACT;  Surgeon: Soniya Macias MD;  Location: [facility] OR [address] [place] [address];  Service: Ophthalmology;  Laterality: Right;    TUBAL LIGATION          Social History:  She reports that she quit smoking about 8 months ago. Her smoking use included cigarettes. She has a 10 pack-year smoking history. She has never used smokeless tobacco. She reports that she does not drink alcohol and does not use drugs.     Family History:  family history includes Blindness in her mother; Breast cancer (age of onset: 33) in her  "sister; Diabetes in her mother and sister; Glaucoma in her brother and mother; Heart attack (age of onset: 40) in her brother; Hypertension in her mother; Kidney failure in her mother; Stomach cancer in her father.     Medications Ordered Prior to Encounter[1]  Review of patient's allergies indicates:  No Known Allergies       Physical exam:  Height 5' 3" (1.6 m), weight 99.8 kg (220 lb 0.3 oz).  General: no apparent distress    Gait: + antalgic, + limp, - use of assistive devices    Physical Exam    Musculoskeletal: Incision looks really good. Tenderness in the front of the hip. Tenderness on the side of the hip. Strain when leg is straightened. Pain when bending the knee. Pain when rotating hip outwards. Pain when rotating hip inwards. Pain when lifting right leg. Pain along the side of the hip and thigh.  IMAGING:  - XR Left Hip: March 2023  - MRI Left Hip: April 2023, findings include fluid noted on the side of the hip bone, white area visible on the side of the bone indicating swelling         Physical Exam    Musculoskeletal: Incision looks really good. Tenderness in the front of the hip. Tenderness on the side of the hip. Strain when leg is straightened. Pain when bending the knee. Pain when rotating hip outwards. Pain when rotating hip inwards. Pain when lifting right leg. Pain along the side of the hip and thigh.  IMAGING:  - XR Left Hip: March 2023  - MRI Left Hip: April 2023, findings include fluid noted on the side of the hip bone, white area visible on the side of the bone indicating swelling       right hip:   Skin: intact, well healed posterior incision   TTP at the greater troch   Range of motion: 90 degrees of flexion, 20 degrees internal rotation, 20 degrees external rotation  Strength: 4/5 with abduction, 4/5 with flexion- pain limited  Provocative testing: lateral pain with + FADIR, + ADILENE, - logroll, - SLR  Neurovascular: + DP pulse, WWP, Light touch sensation intact       Relevant " Results:  Imaging:  Plain x-rays of the R hip were obtained 3/17/25 and independently reviewed by me today, 05/21/2025, and demonstrate cementless BILLY, [place] components, slighltly vertical cup position, but no sign of loosening, migration, osteolysis, or fracture. No change in position when compared to prior XRs from 2023, 2019, and 2018.    MRI of the R hip 4/1/25: Impression - Operative change of right total hip arthroplasty.  No evidence for loosening or adverse local tissue reaction.         Labs:  ESR: 49  CRP: 2.3  WBC: 11.26  Hb: 11.1  Cr: 0.8  Alb: 4.1  Co: 0.3  Chr: 0.3  Ti: 1      This note was generated with the assistance of ambient listening technology. Verbal consent was obtained by the patient and accompanying visitor(s) for the recording of patient appointment to facilitate this note. I attest to having reviewed and edited the generated note for accuracy, though some syntax or spelling errors may persist. Please contact the author of this note for any clarification.                            [1]   Current Outpatient Medications on File Prior to Visit   Medication Sig Dispense Refill    albuterol (PROVENTIL/VENTOLIN HFA) 90 mcg/actuation inhaler INHALE 2 PUFFS INTO THE LUNGS EVERY 6 (SIX) HOURS AS NEEDED FOR WHEEZING. RESCUE 18 g 0    atorvastatin (LIPITOR) 10 MG tablet Take 2 tablets (20 mg total) by mouth once daily.      cycloSPORINE (VEVYE) 0.1 % Drop Place 1 drop into both eyes 2 (two) times a day. (Patient not taking: Reported on 5/13/2025) 2 mL 11    diclofenac sodium (VOLTAREN ARTHRITIS PAIN) 1 % Gel Apply 2 g topically once daily. 100 each 1    dorzolamide-timolol, PF, (COSOPT PF) 2-0.5 % Dpet ophthalmic solution Place 1 drop into both eyes 2 (two) times daily. 60 each 6    latanoprost 0.005 % ophthalmic solution 1 drop.      levothyroxine (SYNTHROID) 75 MCG tablet Take 1 tablet (75 mcg total) by mouth before breakfast. 30 tablet 11    metFORMIN (GLUMETZA) 1000 MG (MOD) 24hr tablet Take 1  tablet (1,000 mg total) by mouth daily with breakfast. 90 tablet 1    methylPREDNISolone (MEDROL DOSEPACK) 4 mg tablet use as directed 1 each 0    mometasone (ELOCON) 0.1 % ointment aaa bid. Not more than 2 weeks straight in same location. Avoid use on face and groin 60 g 1    perfluorohexyloctane, PF, (MIEBO, PF,) 100 % Drop Place 1 drop into both eyes 2 (two) times a day. 3 mL 11    propylene glycoL (VISTA MEIBO TEARS) 0.6 % Drop Place 1 drop into both eyes 2 (two) times a day. 20 mL 11    valsartan-hydrochlorothiazide (DIOVAN-HCT) 160-12.5 mg per tablet TAKE 1 TABLET BY MOUTH EVERY DAY 90 tablet 1     No current facility-administered medications on file prior to visit.

## 2025-05-20 ENCOUNTER — OFFICE VISIT (OUTPATIENT)
Dept: ORTHOPEDICS | Facility: CLINIC | Age: 70
End: 2025-05-20
Payer: MEDICARE

## 2025-05-20 VITALS — HEIGHT: 63 IN | BODY MASS INDEX: 38.98 KG/M2 | WEIGHT: 220 LBS

## 2025-05-20 DIAGNOSIS — M70.61 GREATER TROCHANTERIC BURSITIS OF RIGHT HIP: Primary | ICD-10-CM

## 2025-05-20 DIAGNOSIS — Z96.641 S/P HIP REPLACEMENT, RIGHT: ICD-10-CM

## 2025-05-20 PROCEDURE — 3066F NEPHROPATHY DOC TX: CPT | Mod: CPTII,S$GLB,, | Performed by: STUDENT IN AN ORGANIZED HEALTH CARE EDUCATION/TRAINING PROGRAM

## 2025-05-20 PROCEDURE — 3061F NEG MICROALBUMINURIA REV: CPT | Mod: CPTII,S$GLB,, | Performed by: STUDENT IN AN ORGANIZED HEALTH CARE EDUCATION/TRAINING PROGRAM

## 2025-05-20 PROCEDURE — 1125F AMNT PAIN NOTED PAIN PRSNT: CPT | Mod: CPTII,S$GLB,, | Performed by: STUDENT IN AN ORGANIZED HEALTH CARE EDUCATION/TRAINING PROGRAM

## 2025-05-20 PROCEDURE — 99214 OFFICE O/P EST MOD 30 MIN: CPT | Mod: 25,S$GLB,, | Performed by: STUDENT IN AN ORGANIZED HEALTH CARE EDUCATION/TRAINING PROGRAM

## 2025-05-20 PROCEDURE — 1160F RVW MEDS BY RX/DR IN RCRD: CPT | Mod: CPTII,S$GLB,, | Performed by: STUDENT IN AN ORGANIZED HEALTH CARE EDUCATION/TRAINING PROGRAM

## 2025-05-20 PROCEDURE — 99999 PR PBB SHADOW E&M-EST. PATIENT-LVL II: CPT | Mod: PBBFAC,,, | Performed by: STUDENT IN AN ORGANIZED HEALTH CARE EDUCATION/TRAINING PROGRAM

## 2025-05-20 PROCEDURE — 3008F BODY MASS INDEX DOCD: CPT | Mod: CPTII,S$GLB,, | Performed by: STUDENT IN AN ORGANIZED HEALTH CARE EDUCATION/TRAINING PROGRAM

## 2025-05-20 PROCEDURE — 20610 DRAIN/INJ JOINT/BURSA W/O US: CPT | Mod: RT,S$GLB,, | Performed by: STUDENT IN AN ORGANIZED HEALTH CARE EDUCATION/TRAINING PROGRAM

## 2025-05-20 PROCEDURE — 3044F HG A1C LEVEL LT 7.0%: CPT | Mod: CPTII,S$GLB,, | Performed by: STUDENT IN AN ORGANIZED HEALTH CARE EDUCATION/TRAINING PROGRAM

## 2025-05-20 PROCEDURE — 1159F MED LIST DOCD IN RCRD: CPT | Mod: CPTII,S$GLB,, | Performed by: STUDENT IN AN ORGANIZED HEALTH CARE EDUCATION/TRAINING PROGRAM

## 2025-05-20 RX ADMIN — LIDOCAINE HYDROCHLORIDE 10 ML: 10 INJECTION, SOLUTION INFILTRATION; PERINEURAL at 08:05

## 2025-05-21 ENCOUNTER — TELEPHONE (OUTPATIENT)
Dept: ORTHOPEDICS | Facility: CLINIC | Age: 70
End: 2025-05-21
Payer: MEDICARE

## 2025-05-21 RX ORDER — LIDOCAINE HYDROCHLORIDE 10 MG/ML
10 INJECTION, SOLUTION INFILTRATION; PERINEURAL
Status: DISCONTINUED | OUTPATIENT
Start: 2025-05-20 | End: 2025-05-21 | Stop reason: HOSPADM

## 2025-05-21 NOTE — PROCEDURES
Large Joint Aspiration/Injection: R greater trochanteric bursa    Date/Time: 5/20/2025 8:20 AM    Performed by: Bill Avalos MD  Authorized by: Bill Avalos MD    Consent Done?:  Yes (Verbal)  Indications:  Diagnostic evaluation and pain  Timeout: prior to procedure the correct patient, procedure, and site was verified      Details:  Needle Size:  22 G  Approach:  Lateral  Location:  Hip  Site:  R greater trochanteric bursa  Medications:  10 mL LIDOcaine HCL 10 mg/ml (1%) 10 mg/mL (1 %)  Patient tolerance:  Patient tolerated the procedure well with no immediate complications

## 2025-05-22 ENCOUNTER — OFFICE VISIT (OUTPATIENT)
Dept: INTERNAL MEDICINE | Facility: CLINIC | Age: 70
End: 2025-05-22
Payer: MEDICARE

## 2025-05-22 VITALS
HEART RATE: 76 BPM | WEIGHT: 219.13 LBS | BODY MASS INDEX: 38.83 KG/M2 | DIASTOLIC BLOOD PRESSURE: 72 MMHG | OXYGEN SATURATION: 99 % | HEIGHT: 63 IN | SYSTOLIC BLOOD PRESSURE: 118 MMHG

## 2025-05-22 DIAGNOSIS — R25.2 MUSCLE CRAMPS: ICD-10-CM

## 2025-05-22 DIAGNOSIS — E66.01 MORBID (SEVERE) OBESITY DUE TO EXCESS CALORIES: ICD-10-CM

## 2025-05-22 DIAGNOSIS — E78.5 HYPERLIPIDEMIA, UNSPECIFIED HYPERLIPIDEMIA TYPE: ICD-10-CM

## 2025-05-22 DIAGNOSIS — E11.9 CONTROLLED TYPE 2 DIABETES MELLITUS WITHOUT COMPLICATION, WITHOUT LONG-TERM CURRENT USE OF INSULIN: ICD-10-CM

## 2025-05-22 DIAGNOSIS — M25.559 HIP PAIN, UNSPECIFIED LATERALITY: Primary | ICD-10-CM

## 2025-05-22 DIAGNOSIS — Z96.641 STATUS POST TOTAL REPLACEMENT OF RIGHT HIP: ICD-10-CM

## 2025-05-22 DIAGNOSIS — I10 HYPERTENSION, UNSPECIFIED TYPE: ICD-10-CM

## 2025-05-22 DIAGNOSIS — E03.9 HYPOTHYROIDISM, UNSPECIFIED TYPE: ICD-10-CM

## 2025-05-22 DIAGNOSIS — D64.9 ANEMIA, UNSPECIFIED TYPE: ICD-10-CM

## 2025-05-22 PROBLEM — M87.051 AVASCULAR NECROSIS OF RIGHT FEMUR: Status: RESOLVED | Noted: 2018-11-26 | Resolved: 2025-05-22

## 2025-05-22 PROBLEM — R94.31 ELECTROCARDIOGRAM ABNORMAL: Status: RESOLVED | Noted: 2017-01-16 | Resolved: 2025-05-22

## 2025-05-22 PROCEDURE — 99999 PR PBB SHADOW E&M-EST. PATIENT-LVL IV: CPT | Mod: PBBFAC,GC,,

## 2025-05-22 NOTE — PROGRESS NOTES
[facility]  CLINIC NOTE    Patient Name: Carolee Bartlett  YOB: 1955    PRESENTING HISTORY       History of Present Illness:  Ms. Carolee Bartlett is a 70 y.o. female with a past medical history of HLD, HTN, diabetes, hypothyroid 2/2 Grave's disease who presents to clinic for follow-up on her cholesterol and TSH. However, patient did not obtain repeat lab prior to visit.    Ongoing right sided hip pain has significantly worsened. Was seen by Ortho in April and inflammatory markers ordered due to suspicion of prosthetic joint infection. ESR 64 CRP 9.5. Joint was subsequently aspirated on 5/13, though unsuccessful. Symptoms thought to be due to trochanteric bursitis. Was evaluated by Dr. Avalos on Tuesday (5/20) and received a cortisone injection with some relief. Has another injection scheduled next for next week with plan to continue every 3 months. Tried Tylenol, Naproxen, and PT a few times in the past, without much improvement. She continues to do chair exercises she learned at home.     Continues to have fatigue but reports some improvement in hair loss and skin changes. Last TSH obtained in 2/2025 and 0.094. Levothyroxine modified to 75 mcg and patient has remained compliant. Patient also reporting random, intermittent diffuse body cramps. Denies recent changes in activity. Also denies dysphagia, sore throat, cough, post nasal drip, recent trauma.       Social History  EtOH: social  Tobacco: former, quit in September 2024 (15 years, pack would last about a week)  Recreational Drug use: none  Diet: attempting to eat a more balanced diet  Exercise: limited due to arthritic pain    Health Maintenance  Mammogram: Next due on 2/3/2026  Bone Density Screening: Next due on 11/14/2026  Colon Cancer screening: Next due on 3/27/2028  Missing Vaccinations: COVID, RSV, shingles, tetanus  Diabetes Screening: Next due on 2/27/2026    Review of Systems   Constitutional:  Positive  for malaise/fatigue. Negative for chills, fever and weight loss.        Hair loss   HENT:          Negative dysphagia   Eyes:         Positive for bilateral eye dryness   Respiratory:  Negative for shortness of breath.    Cardiovascular:  Negative for chest pain.   Gastrointestinal:  Negative for constipation, diarrhea, nausea and vomiting.   Genitourinary: Negative.    Musculoskeletal:  Positive for back pain and joint pain (right sided hip pain).        Positive for diffuse muscle cramping   Skin:         Skin darkening across knuckles (improved)   Neurological:  Positive for weakness (bilateral LE). Negative for dizziness, tingling and headaches.       PAST HISTORY:     Past Medical History:   Diagnosis Date    Avascular necrosis     Diabetes mellitus     Encounter for blood transfusion     Glaucoma     Graves disease     Hypertension     Other abnormal glucose     Pre-Diabetic    Sciatica        Past Surgical History:   Procedure Laterality Date    CATARACT EXTRACTION W/  INTRAOCULAR LENS IMPLANT Right 01/26/2023        CATARACT EXTRACTION W/  INTRAOCULAR LENS IMPLANT Left 09/08/2022        COLONOSCOPY N/A 03/27/2023    Procedure: COLONOSCOPY;  Surgeon: Yohannes Francois MD;  Location: [facility] ENDOSCOPY;  Service: Endoscopy;  Laterality: N/A;  instr via portal; AP  3/24 pre call complete, pt stated she would have a ride -CE    HIP ARTHROPLASTY Right 11/26/2018    Procedure: ARTHROPLASTY, HIP, Raciel, peg board, first assist;  Surgeon: Olayinka Redding MD;  Location: [facility];  Service: Orthopedics;  Laterality: Right;  RACIEL ORTHO CONFIRMED 11/20/DME FIRST ASSISTANT CONFIRMED 11/21/DME    HIP REPLACEMENT ARTHROPLASTY Right     INTRAOCULAR PROSTHESES INSERTION Left 09/08/2022    Procedure: INSERTION, IOL PROSTHESIS;  Surgeon: Soniya Macias MD;  Location: [address];  Service: Ophthalmology;  Laterality: Left;    INTRAOCULAR PROSTHESES INSERTION Right 01/26/2023    Procedure:  INSERTION, IOL PROSTHESIS;  Surgeon: Soniya Macias MD;  Location: [address];  Service: Ophthalmology;  Laterality: Right;    PHACOEMULSIFICATION OF CATARACT Left 2022    Procedure: PHACOEMULSIFICATION, CATARACT;  Surgeon: Soniya Macias MD;  Location: [facility] OR [address] [place] [address];  Service: Ophthalmology;  Laterality: Left;    PHACOEMULSIFICATION OF CATARACT Right 2023    Procedure: PHACOEMULSIFICATION, CATARACT;  Surgeon: Soniya Macias MD;  Location: [facility] OR [address] [place] [address];  Service: Ophthalmology;  Laterality: Right;    TUBAL LIGATION         Family History   Problem Relation Name Age of Onset    Kidney failure Mother      Hypertension Mother          was on list to have kidney, lung, and heart transplant    Glaucoma Mother      Blindness Mother      Diabetes Mother      Stomach cancer Father      Breast cancer Sister  33        double mastectomy    Diabetes Sister      Heart attack Brother  40    Glaucoma Brother      Amblyopia Neg Hx      Cataracts Neg Hx      Macular degeneration Neg Hx      Retinal detachment Neg Hx      Strabismus Neg Hx         Social History     Socioeconomic History    Marital status:     Number of children: 1   Occupational History     Comment: volunteer at the school    Tobacco Use    Smoking status: Former     Current packs/day: 0.00     Average packs/day: 0.3 packs/day for 40.0 years (10.0 ttl pk-yrs)     Types: Cigarettes     Quit date: 2024     Years since quittin.7    Smokeless tobacco: Never   Substance and Sexual Activity    Alcohol use: No    Drug use: No     Social Drivers of Health     Financial Resource Strain: Low Risk  (2025)    Overall Financial Resource Strain (CARDIA)     Difficulty of Paying Living Expenses: Not hard at all   Food Insecurity: No Food Insecurity (2025)    Hunger Vital Sign     Worried About Running Out of Food in the Last Year: Never true     Ran Out of Food in the Last Year: Never true   Recent Concern: Food Insecurity -  High Risk (2/6/2025)    Received from [facility] SDOH Screening     In the past year have you or any family members you live with been unable to get any of the following when it was really needed?  check all that apply: Foodutilities   Transportation Needs: No Transportation Needs (2/20/2025)    PRAPARE - Transportation     Lack of Transportation (Medical): No     Lack of Transportation (Non-Medical): No   Physical Activity: Unknown (2/20/2025)    Exercise Vital Sign     Days of Exercise per Week: 1 day   Stress: No Stress Concern Present (2/20/2025)    [employer] - Occupational Stress Questionnaire     Feeling of Stress : Not at all   Housing Stability: Low Risk  (2/20/2025)    Housing Stability Vital Sign     Unable to Pay for Housing in the Last Year: No     Homeless in the Last Year: No       MEDICATIONS & ALLERGIES:     Current Outpatient Medications on File Prior to Visit   Medication Sig    albuterol (PROVENTIL/VENTOLIN HFA) 90 mcg/actuation inhaler INHALE 2 PUFFS INTO THE LUNGS EVERY 6 (SIX) HOURS AS NEEDED FOR WHEEZING. RESCUE    atorvastatin (LIPITOR) 10 MG tablet Take 2 tablets (20 mg total) by mouth once daily.    cycloSPORINE (VEVYE) 0.1 % Drop Place 1 drop into both eyes 2 (two) times a day. (Patient not taking: Reported on 5/13/2025)    diclofenac sodium (VOLTAREN ARTHRITIS PAIN) 1 % Gel Apply 2 g topically once daily.    dorzolamide-timolol, PF, (COSOPT PF) 2-0.5 % Dpet ophthalmic solution Place 1 drop into both eyes 2 (two) times daily.    latanoprost 0.005 % ophthalmic solution 1 drop.    levothyroxine (SYNTHROID) 75 MCG tablet Take 1 tablet (75 mcg total) by mouth before breakfast.    metFORMIN (GLUMETZA) 1000 MG (MOD) 24hr tablet Take 1 tablet (1,000 mg total) by mouth daily with breakfast.    methylPREDNISolone (MEDROL DOSEPACK) 4 mg tablet use as directed    mometasone (ELOCON) 0.1 % ointment aaa bid. Not more than 2 weeks straight in same location.  "Avoid use on face and groin    perfluorohexyloctane, PF, (MIEBO, PF,) 100 % Drop Place 1 drop into both eyes 2 (two) times a day.    propylene glycoL ([place] TEARS) 0.6 % Drop Place 1 drop into both eyes 2 (two) times a day.    valsartan-hydrochlorothiazide (DIOVAN-HCT) 160-12.5 mg per tablet TAKE 1 TABLET BY MOUTH EVERY DAY     Current Facility-Administered Medications on File Prior to Visit   Medication    [DISCONTINUED] LIDOcaine HCL 10 mg/ml (1%) injection 10 mL       Review of patient's allergies indicates:  No Known Allergies    OBJECTIVE:   Vital Signs:  Vitals:    05/22/25 1309   BP: 118/72   Pulse: 76   SpO2: 99%   Weight: 99.4 kg (219 lb 2.2 oz)   Height: 5' 3" (1.6 m)       No results found for this or any previous visit (from the past 24 hours).      Physical Exam  Constitutional:       Appearance: She is not ill-appearing.   HENT:      Head: Normocephalic and atraumatic.      Mouth/Throat:      Mouth: Mucous membranes are dry.   Eyes:      Extraocular Movements: Extraocular movements intact.   Cardiovascular:      Rate and Rhythm: Normal rate and regular rhythm.      Pulses: Normal pulses.      Heart sounds: Normal heart sounds.   Pulmonary:      Effort: Pulmonary effort is normal.      Breath sounds: Normal breath sounds.   Abdominal:      Palpations: Abdomen is soft.   Musculoskeletal:         General: Tenderness present.      Cervical back: Normal range of motion.   Skin:     General: Skin is warm and dry.      Capillary Refill: Capillary refill takes less than 2 seconds.   Neurological:      Mental Status: She is alert and oriented to person, place, and time.      Sensory: No sensory deficit.      Motor: No weakness.      Coordination: Coordination normal.      Deep Tendon Reflexes: Reflexes normal.   Psychiatric:         Behavior: Behavior normal.         Laboratory  Lab Results   Component Value Date    WBC 11.26 02/27/2025    HGB 11.1 (L) 02/27/2025    HCT 37.0 02/27/2025    MCV 81 (L) " 02/27/2025     02/27/2025     Lab Results   Component Value Date    HGBA1C 6.9 (H) 02/27/2025       ASSESSMENT & PLAN:     71 y/o F with pmhx of HLD, HTN, diabetes, hypothyroid 2/2 Grave's presenting to follow-up on her hypothyroidism and hyperlipidemia. TSH obtained in February low at 0.094 and Levothyroxine dose modified/decreased to 75 mcg. Patient seems to be tolerating well. However, repeat labs not obtained prior to visit so patient will have these done by next week. Additionally, lipid panel with elevated total cholesterol at 238, , and . Patient was started on Lipitor 20 mg by her [employer] program and seems to be tolerating. As stated above, patient did not obtain repeat labs prior to visit. Once complete, I will review them and call the patient to notify of any changes to be made.    Additional complaints of ongoing right hip pain addressed. Following with Ortho and currently getting cortisone injections with some relief. Continue to experience generalized weakness. CBC obtained during our last visit did show slight microcytic anemia with hgb 11.1 (BS 12-13) and MCV 81. Will order iron panel and ferritin to further investigate as patient reports to have dealt with anemia in the past. BP well controlled on medication. 118/72 today and 96/60 during our last visit. Reviewed sugar trends, however, patient has mostly been obtaining post-prandial reads. I recommend that she continue to log her glucose levels but to randomize checks. A1c in February 6.9. Will have her obtain repeat A1c with other labs to determine if further adjustments to medication/lifestyle are warranted. She does report compliance with Metformin and that she has been more mindful with diet.     Also addressed new reports of intermittent muscle cramping. I recommend that patient manage with stretches at home for now. We will continue to observe and may consider further work-up or trying muscle relaxers if symptoms  worsen.    Health maintenance/Care gaps reviewed and deferred for today. I will have patient follow-up with me in 3 months to review the results.      Hyperlipidemia, unspecified hyperlipidemia type  -     Lipid Panel; Future    Controlled type 2 diabetes mellitus without complication, without long-term current use of insulin  -     Hemoglobin A1C; Future  -  Continue to monitor sugars at home, discuss randomizing checks to obtain more accurate data    Status post total replacement of right hip       - Treating with cortisone injections, continue to follow with Ortho    Hypertension, unspecified type       -   Well-controlled, continue present treatment with Diovan    Hypothyroidism, unspecified type  -     TSH; Future    Morbid (severe) obesity due to excess calories        -  Discussed life-style modifications including diet and exercise    Anemia, unspecified type  -     Iron and TIBC; Future  -     Ferritin; Future    Muscle cramps         -  Continue to manage with stretches at home, please contact me if symptoms do not improve/worsen      RTC in 3 months    Discussed with Dr. Barrientos - staff attestation to follow    Alysa Rubio DO  Internal Medicine PGY-1  [facility]  [address] [place] [address]

## 2025-05-22 NOTE — PROGRESS NOTES
Troch bursa CS injection done today as patient had excellent relief after diagnostic injection done last week. See separate procedure note.

## 2025-05-22 NOTE — PATIENT INSTRUCTIONS
Please make sure to obtain the following labs at your earliest convenience:     TSH, hemoglobin A1c, and lipid panel    I have also ordered an iron panel for you to obtain.

## 2025-05-27 ENCOUNTER — PATIENT MESSAGE (OUTPATIENT)
Dept: INTERNAL MEDICINE | Facility: CLINIC | Age: 70
End: 2025-05-27
Payer: MEDICARE

## 2025-05-27 ENCOUNTER — OFFICE VISIT (OUTPATIENT)
Dept: ORTHOPEDICS | Facility: CLINIC | Age: 70
End: 2025-05-27
Payer: MEDICARE

## 2025-05-27 ENCOUNTER — LAB VISIT (OUTPATIENT)
Dept: LAB | Facility: HOSPITAL | Age: 70
End: 2025-05-27
Payer: MEDICARE

## 2025-05-27 VITALS — WEIGHT: 223.88 LBS | BODY MASS INDEX: 39.67 KG/M2 | HEIGHT: 63 IN

## 2025-05-27 DIAGNOSIS — E11.65 TYPE 2 DIABETES MELLITUS WITH HYPERGLYCEMIA, WITHOUT LONG-TERM CURRENT USE OF INSULIN: ICD-10-CM

## 2025-05-27 DIAGNOSIS — E78.5 HYPERLIPIDEMIA, UNSPECIFIED HYPERLIPIDEMIA TYPE: ICD-10-CM

## 2025-05-27 DIAGNOSIS — E11.9 CONTROLLED TYPE 2 DIABETES MELLITUS WITHOUT COMPLICATION, WITHOUT LONG-TERM CURRENT USE OF INSULIN: ICD-10-CM

## 2025-05-27 DIAGNOSIS — M70.61 GREATER TROCHANTERIC BURSITIS OF RIGHT HIP: Primary | ICD-10-CM

## 2025-05-27 DIAGNOSIS — E03.9 HYPOTHYROIDISM, UNSPECIFIED TYPE: ICD-10-CM

## 2025-05-27 DIAGNOSIS — E89.0 POSTABLATIVE HYPOTHYROIDISM: ICD-10-CM

## 2025-05-27 DIAGNOSIS — D64.9 ANEMIA, UNSPECIFIED TYPE: ICD-10-CM

## 2025-05-27 DIAGNOSIS — Z96.641 S/P HIP REPLACEMENT, RIGHT: ICD-10-CM

## 2025-05-27 DIAGNOSIS — D64.9 ANEMIA, UNSPECIFIED TYPE: Primary | ICD-10-CM

## 2025-05-27 LAB
CHOLEST SERPL-MCNC: 233 MG/DL (ref 120–199)
CHOLEST/HDLC SERPL: 3.8 {RATIO} (ref 2–5)
EAG (OHS): 166 MG/DL (ref 68–131)
FERRITIN SERPL-MCNC: 17 NG/ML (ref 20–300)
HBA1C MFR BLD: 7.4 % (ref 4–5.6)
HDLC SERPL-MCNC: 62 MG/DL (ref 40–75)
HDLC SERPL: 26.6 % (ref 20–50)
IRON SATN MFR SERPL: 7 % (ref 20–50)
IRON SERPL-MCNC: 37 UG/DL (ref 30–160)
LDLC SERPL CALC-MCNC: 141.2 MG/DL (ref 63–159)
NONHDLC SERPL-MCNC: 171 MG/DL
T4 FREE SERPL-MCNC: 0.83 NG/DL (ref 0.71–1.51)
TIBC SERPL-MCNC: 522 UG/DL (ref 250–450)
TRANSFERRIN SERPL-MCNC: 353 MG/DL (ref 200–375)
TRIGL SERPL-MCNC: 149 MG/DL (ref 30–150)
TSH SERPL-ACNC: 4.04 UIU/ML (ref 0.4–4)

## 2025-05-27 PROCEDURE — 80061 LIPID PANEL: CPT

## 2025-05-27 PROCEDURE — 82728 ASSAY OF FERRITIN: CPT

## 2025-05-27 PROCEDURE — 20610 DRAIN/INJ JOINT/BURSA W/O US: CPT | Mod: RT,S$GLB,, | Performed by: STUDENT IN AN ORGANIZED HEALTH CARE EDUCATION/TRAINING PROGRAM

## 2025-05-27 PROCEDURE — 84439 ASSAY OF FREE THYROXINE: CPT

## 2025-05-27 PROCEDURE — 36415 COLL VENOUS BLD VENIPUNCTURE: CPT

## 2025-05-27 PROCEDURE — 99499 UNLISTED E&M SERVICE: CPT | Mod: S$GLB,,, | Performed by: STUDENT IN AN ORGANIZED HEALTH CARE EDUCATION/TRAINING PROGRAM

## 2025-05-27 PROCEDURE — 84443 ASSAY THYROID STIM HORMONE: CPT

## 2025-05-27 PROCEDURE — 83036 HEMOGLOBIN GLYCOSYLATED A1C: CPT

## 2025-05-27 PROCEDURE — 84466 ASSAY OF TRANSFERRIN: CPT

## 2025-05-27 PROCEDURE — 99999 PR PBB SHADOW E&M-EST. PATIENT-LVL III: CPT | Mod: PBBFAC,,, | Performed by: STUDENT IN AN ORGANIZED HEALTH CARE EDUCATION/TRAINING PROGRAM

## 2025-05-27 RX ORDER — FERROUS SULFATE 325(65) MG
325 TABLET ORAL
Qty: 30 TABLET | Refills: 0 | Status: SHIPPED | OUTPATIENT
Start: 2025-05-27

## 2025-05-27 RX ORDER — LIDOCAINE HYDROCHLORIDE 10 MG/ML
5 INJECTION, SOLUTION INFILTRATION; PERINEURAL
Status: DISCONTINUED | OUTPATIENT
Start: 2025-05-27 | End: 2025-05-27 | Stop reason: HOSPADM

## 2025-05-27 RX ORDER — TRIAMCINOLONE ACETONIDE 40 MG/ML
40 INJECTION, SUSPENSION INTRA-ARTICULAR; INTRAMUSCULAR
Status: DISCONTINUED | OUTPATIENT
Start: 2025-05-27 | End: 2025-05-27 | Stop reason: HOSPADM

## 2025-05-27 RX ORDER — SEMAGLUTIDE 0.68 MG/ML
0.25 INJECTION, SOLUTION SUBCUTANEOUS
Qty: 1.5 ML | Refills: 2 | Status: SHIPPED | OUTPATIENT
Start: 2025-05-27

## 2025-05-27 RX ORDER — LEVOTHYROXINE SODIUM 88 UG/1
88 TABLET ORAL
Qty: 30 TABLET | Refills: 11 | Status: SHIPPED | OUTPATIENT
Start: 2025-05-27 | End: 2026-05-27

## 2025-05-27 RX ADMIN — LIDOCAINE HYDROCHLORIDE 5 ML: 10 INJECTION, SOLUTION INFILTRATION; PERINEURAL at 09:05

## 2025-05-27 RX ADMIN — TRIAMCINOLONE ACETONIDE 40 MG: 40 INJECTION, SUSPENSION INTRA-ARTICULAR; INTRAMUSCULAR at 09:05

## 2025-05-27 NOTE — TELEPHONE ENCOUNTER
Reviewed lab work obtained today. The patient's TSH has significantly increased from 2 months ago. 4.035 today. I will modify her Levothyroxine to 88 mcg and see if this will be more therapeutic for her.  Iron labs obtained and patient with elevated TIBC and low iron saturation and ferritin indicating EDER. Will send over a prescription for Iron as well. Lastly, the patient's A1c has increased from 6.9 to 7.4. She's currently on Metformin 1000 mg. I think she would benefit from starting a GLP-1 like Ozempic, as this will not only help her sugars but also her weight. I will sent this prescription [facility] to [facility]. Spoke with patient about results and new prescriptions to be filled. She verbalized understanding and agreement. She will follow-up with me in clinic in 3 months.

## 2025-05-27 NOTE — PROCEDURES
Large Joint Aspiration/Injection: R greater trochanteric bursa    Date/Time: 5/27/2025 9:00 AM    Performed by: Bill Avalos MD  Authorized by: Bill Avalos MD    Consent Done?:  Yes (Verbal)  Indications:  Diagnostic evaluation and pain  Timeout: prior to procedure the correct patient, procedure, and site was verified      Details:  Needle Size:  22 G  Approach:  Lateral  Location:  Hip  Site:  R greater trochanteric bursa  Medications:  5 mL LIDOcaine HCL 10 mg/ml (1%) 10 mg/mL (1 %); 40 mg triamcinolone acetonide 40 mg/mL  Patient tolerance:  Patient tolerated the procedure well with no immediate complications

## 2025-06-17 ENCOUNTER — TELEPHONE (OUTPATIENT)
Dept: ORTHOPEDICS | Facility: CLINIC | Age: 70
End: 2025-06-17
Payer: MEDICARE

## 2025-07-03 DIAGNOSIS — E11.65 TYPE 2 DIABETES MELLITUS WITH HYPERGLYCEMIA, WITHOUT LONG-TERM CURRENT USE OF INSULIN: ICD-10-CM

## 2025-07-06 RX ORDER — SEMAGLUTIDE 0.68 MG/ML
0.25 INJECTION, SOLUTION SUBCUTANEOUS
Qty: 3 ML | Refills: 2 | Status: SHIPPED | OUTPATIENT
Start: 2025-07-06

## 2025-07-09 NOTE — PROGRESS NOTES
Assessment:  Encounter Diagnoses   Name Primary?    Greater trochanteric bursitis of right hip Yes    S/P hip replacement, right        Plan: We discussed that too many GT CSIs can cause weakening of the tendon and exacerbate the  problem, therefore we do not recommend another CSI at this time. The last CSI only worked for a month, and we discussed that she needs to work with PT on strengthening for long term relief. Also referred to Dr. Humphries, non-operative  for potential alternative treatments.  - Continue topical treatments such as patches and creams as desired.  - Started oral Voltaren anti-inflammatory medication, twice daily. Discontinue OTC Ibuprofen and Advil.  Continue Voltaren cream as desired.  - Follow up with me in 2 months.        Patient ID: Carolee Bartlett is a 70 y.o. female.  Chief Complaint   Patient presents with    Right Hip - Pain        History of Present Illness    CHIEF COMPLAINT:  - Right hip pain    HPI:  Ms. Bartlett presents for follow-up of right hip pain. She reports lateral right hip pain that subsided for about 4-5 weeks after her last injection but has since recurred. Pain is constant, present during walking and lying down. She describes a new symptom of right hip instability while walking, stating that it feels as if it might give out and has a wobbly sensation. Pain impacts her daily activities, preventing her from attending social events.    For pain management, she frequently takes OTC medications throughout the day, alternating between 800mg of Tylenol (two tablets) and Advil (four tablets) more than 3 times daily. She notes that pain relief from these medications does not last long. She has also been using various topical treatments, including lidocaine patches, bio-freeze patches, Bengay ointment, and Voltaren cream. She applies these treatments frequently throughout the day.    Pain is localized to the lateral aspect of the hip and extends to an area  above the knee. She denies any issues with wounds or complications from the previous injection site. She denies any history of stomach ulcers.    PREVIOUS TREATMENTS:  - Ms. Bartlett received a hip injection approximately 4-5 weeks ago, which provided pain relief for that duration.  - Ms. Bartlett tried PT a year ago, but it was not effective in resolving the pain.    MEDICATIONS:  - Tylenol: 800 mg 2 tablets multiple times daily  - Advil (Ibuprofen): 4 tablets multiple times daily  - Lidocaine patches  - Bio-freeze patches  - Bengay ointment: applied throughout the day  - Voltaren cream: applied topically    SURGICAL HISTORY:  - Hip replacement    WORK STATUS:  - Unable to attend social events due to pain            CC: right Hip pain    70 y.o. Female presents today for follow up evaluation of her right Hip pain. Since last visit, patient reports pain has Remained unchanged. Recall, she had a GTB CSI on 5/27/25.    Surgical History: R BILLY in 2017 by Dr Redding    Pain score: 8/10    Pain location: lateral aspect of the Hip   Pain quality: Sharp / Stabbing and Dull / Aching    History of trauma/injury: none    Feelings of instability?:occasionally     Hip pain is worsened by General walking, difficulty going up/down steps, and difficulty getting in/out of the car.     Conservative Treatments:     Assistive Device:    Physical Therapy:    Infection Workup:   ESR/CRP   Aspiration   Metal levels    5/20/25 HPI:  Ms. Bartlett presents for evaluation of left hip pain following a left total hip arthroplasty performed in 2017 with Dr. Redding. Initially, the surgery was successful with no complications or issues with wound healing. Approximately two years ago, she began experiencing gradual onset pain in the left hip, with no specific inciting event. Pain has progressively worsened over time.   She reports pain throughout the hip, including the front, back, and outside. Sharp pain and numbness/tingling occur in the back of the  leg, extending down to the knee and occasionally beyond. She has difficulty sitting or standing for prolonged periods, experiencing frequent need to change positions throughout the night. Pain affects her ability to care for her 2-year-old child, limiting her mobility.   She reports left hip instability, feeling like it might give out. She has difficulty with activities such as putting on shoes and socks. She has a cane but tries not to use it. She notes a long-standing swelling on her leg that has been XRed.   She had PT about a year ago but stopped due to family circumstances. Recent imaging includes an MRI in April and XRs in March. Lab work was performed, but an attempted hip aspiration was unsuccessful in obtaining fluid.   She denies any history of heart attack, stroke, or blood clots.   PREVIOUS TREATMENTS:  - PT: Approximately one year ago, discontinued due to family circumstances   SURGICAL HISTORY:  - Left total hip arthroplasty: 2017, initially successful with no complications or issues with wound healing   WORK STATUS:  - Caring for a 2-year-old child  - Experiencing difficulty in  activities due to hip pain       Surgical History: R posterior BILLY on 11/26/2018 by Dr. Art Redding  [place] Trident2 Tritanium acetabular cup size 50mm   [place] Accolade II femoral stem size 1   Raciel 36mm +7.5  ceramic head   25mm [place] acetabular screw    Immediate Complications: none - pain returned 2 years ago. Hx of a fall in her home around this time. Appreciates numbness/tingling down the entire posterior leg   Feelings of instability?: yes - denies dislocation event    Hip pain is worsened by walking distances, sitting for prolonged time, sit-to stand, putting on shoes.    Conservative Treatments:    Assistive Device: occasional cane use   Physical Therapy: previously, in 2023   Infection Workup:              ESR/CRP - ESR: 49, CRP: 2.3 mg/L               Aspiration - attempted on 5/13/25 by   "Venu but only obtained a " few drops of clear yellow fluid" (not sent to lab due to insufficient sample size              Metal levels - WNL, see below   Denies Hx blood clot, heart attack, stroke, and not on blood thinners.  No Hx fragility fracture or osteoporosis   Other prior surgeries on the back/hip/knee/leg: none    Due to the ongoing issue with hip pain following replacement, the patient presents today for evaluation.      Past Medical History:  Past Medical History:   Diagnosis Date    Avascular necrosis     Diabetes mellitus     Encounter for blood transfusion     Glaucoma     Graves disease     Hypertension     Other abnormal glucose     Pre-Diabetic    Sciatica         Surgical History:  Past Surgical History:   Procedure Laterality Date    CATARACT EXTRACTION W/  INTRAOCULAR LENS IMPLANT Right 01/26/2023        CATARACT EXTRACTION W/  INTRAOCULAR LENS IMPLANT Left 09/08/2022        COLONOSCOPY N/A 03/27/2023    Procedure: COLONOSCOPY;  Surgeon: Yohannes Francois MD;  Location: [facility] ENDOSCOPY;  Service: Endoscopy;  Laterality: N/A;  instr via portal; AP  3/24 pre call complete, pt stated she would have a ride -CE    HIP ARTHROPLASTY Right 11/26/2018    Procedure: ARTHROPLASTY, HIP, [place], peg board, first assist;  Surgeon: Olayinka Redding MD;  Location: [facility] OR;  Service: Orthopedics;  Laterality: Right;  NOE ORTHO CONFIRMED 11/20/DME FIRST ASSISTANT CONFIRMED 11/21/DME    HIP REPLACEMENT ARTHROPLASTY Right     INTRAOCULAR PROSTHESES INSERTION Left 09/08/2022    Procedure: INSERTION, IOL PROSTHESIS;  Surgeon: Soniya Macias MD;  Location: [facility] OR [address] [place] [address];  Service: Ophthalmology;  Laterality: Left;    INTRAOCULAR PROSTHESES INSERTION Right 01/26/2023    Procedure: INSERTION, IOL PROSTHESIS;  Surgeon: Soniya Macias MD;  Location: [facility] OR [address] [place] [address];  Service: Ophthalmology;  Laterality: Right;    PHACOEMULSIFICATION OF CATARACT Left 09/08/2022    Procedure: " "PHACOEMULSIFICATION, CATARACT;  Surgeon: Soniya Macias MD;  Location: [facility] OR [address] [place] [address];  Service: Ophthalmology;  Laterality: Left;    PHACOEMULSIFICATION OF CATARACT Right 01/26/2023    Procedure: PHACOEMULSIFICATION, CATARACT;  Surgeon: Soniya Macias MD;  Location: [facility] OR [address] [place] [address];  Service: Ophthalmology;  Laterality: Right;    TUBAL LIGATION          Social History:  She reports that she quit smoking about 10 months ago. Her smoking use included cigarettes. She has a 10 pack-year smoking history. She has never used smokeless tobacco. She reports that she does not drink alcohol and does not use drugs.     Family History:  family history includes Blindness in her mother; Breast cancer (age of onset: 33) in her sister; Diabetes in her mother and sister; Glaucoma in her brother and mother; Heart attack (age of onset: 40) in her brother; Hypertension in her mother; Kidney failure in her mother; Stomach cancer in her father.     Medications Ordered Prior to Encounter[1]  Review of patient's allergies indicates:  No Known Allergies       Physical exam:  Height 5' 3" (1.6 m), weight 97.3 kg (214 lb 8.1 oz).  General: no apparent distress    Gait:  antalgic, significant right sided limp, without use of assistive devices    Physical Exam    MSK: Spine - Hip: Pain on hip adduction. Pain on hip abduction. Pain with straight leg raise on right side. Normal hip range of motion.  Skin: Incision appears normal.  IMAGING:  - MRI Right Hip: Hip replacement appears to be in good condition, Edema (white area) noted on the side of the bone, consistent with bursitis or tendinitis           right hip:   Skin: intact with a well healed posterior hip incision   TTP at the greater troch and mildly TTP in groin over anterior hip  Range of motion: 90 degrees of flexion, 20 degrees internal rotation, 10 degrees external rotation  Strength: 4/5 with abduction, 4/5 with flexion  Provocative testing: + FADIR, + ADILENE, - logroll, - " SLR  Neurovascular: Palpable DP pulse, WWP, Light touch sensation intact       Relevant Results:  Imaging:  Plain x-rays of the R hip were obtained 3/17/25 and independently reviewed by me and demonstrate cementless BILLY, McConnell components, slighltly vertical cup position, but no sign of loosening, migration, osteolysis, or fracture. No change in position when compared to prior XRs from 2023, 2019, and 2018.     MRI of the R hip 4/1/25: Impression - Operative change of right total hip arthroplasty.  No evidence for loosening or adverse local tissue reaction.       Labs:  Lab Results   Component Value Date    HGB 11.1 (L) 02/27/2025    WBC 11.26 02/27/2025    CREATININE 0.8 02/27/2025    ALBUMIN 4.1 02/27/2025    HGBA1C 7.4 (H) 05/27/2025    SEDRATE 49 (H) 04/08/2025    CRP 2.3 04/08/2025           BMI Readings from Last 1 Encounters:   05/27/25 39.66 kg/m²         This note was generated with the assistance of ambient listening technology. Verbal consent was obtained by the patient and accompanying visitor(s) for the recording of patient appointment to facilitate this note. I attest to having reviewed and edited the generated note for accuracy, though some syntax or spelling errors may persist. Please contact the author of this note for any clarification.                                       [1]   Current Outpatient Medications on File Prior to Visit   Medication Sig Dispense Refill    albuterol (PROVENTIL/VENTOLIN HFA) 90 mcg/actuation inhaler INHALE 2 PUFFS INTO THE LUNGS EVERY 6 (SIX) HOURS AS NEEDED FOR WHEEZING. RESCUE 18 g 0    atorvastatin (LIPITOR) 40 MG tablet Take 1 tablet (40 mg total) by mouth every evening. 90 tablet 1    diclofenac sodium (VOLTAREN ARTHRITIS PAIN) 1 % Gel Apply 2 g topically once daily. 100 each 1    dorzolamide-timolol, PF, (COSOPT PF) 2-0.5 % Dpet ophthalmic solution Place 1 drop into both eyes 2 (two) times daily. 60 each 6    ferrous sulfate (IRON) 325 mg (65 mg iron) Tab tablet  Take 1 tablet (325 mg total) by mouth daily with breakfast. 30 tablet 0    latanoprost 0.005 % ophthalmic solution 1 drop.      levothyroxine (SYNTHROID) 88 MCG tablet Take 1 tablet (88 mcg total) by mouth before breakfast. 30 tablet 11    mometasone (ELOCON) 0.1 % ointment aaa bid. Not more than 2 weeks straight in same location. Avoid use on face and groin 60 g 1    perfluorohexyloctane, PF, (MIEBO, PF,) 100 % Drop Place 1 drop into both eyes 2 (two) times a day. 3 mL 11    propylene glycoL (VISTA MEIBO TEARS) 0.6 % Drop Place 1 drop into both eyes 2 (two) times a day. 20 mL 11    semaglutide (OZEMPIC) 0.25 mg or 0.5 mg (2 mg/3 mL) pen injector Inject 0.25 mg into the skin every 7 days. 3 mL 2    valsartan-hydrochlorothiazide (DIOVAN-HCT) 160-12.5 mg per tablet TAKE 1 TABLET BY MOUTH EVERY DAY 90 tablet 1     No current facility-administered medications on file prior to visit.

## 2025-07-17 ENCOUNTER — OFFICE VISIT (OUTPATIENT)
Dept: ORTHOPEDICS | Facility: CLINIC | Age: 70
End: 2025-07-17
Payer: MEDICARE

## 2025-07-17 VITALS — HEIGHT: 63 IN | BODY MASS INDEX: 38.01 KG/M2 | WEIGHT: 214.5 LBS

## 2025-07-17 DIAGNOSIS — M70.61 GREATER TROCHANTERIC BURSITIS OF RIGHT HIP: Primary | ICD-10-CM

## 2025-07-17 DIAGNOSIS — Z96.641 S/P HIP REPLACEMENT, RIGHT: ICD-10-CM

## 2025-07-17 PROCEDURE — 99214 OFFICE O/P EST MOD 30 MIN: CPT | Mod: S$GLB,,, | Performed by: STUDENT IN AN ORGANIZED HEALTH CARE EDUCATION/TRAINING PROGRAM

## 2025-07-17 PROCEDURE — 3051F HG A1C>EQUAL 7.0%<8.0%: CPT | Mod: CPTII,S$GLB,, | Performed by: STUDENT IN AN ORGANIZED HEALTH CARE EDUCATION/TRAINING PROGRAM

## 2025-07-17 PROCEDURE — 1159F MED LIST DOCD IN RCRD: CPT | Mod: CPTII,S$GLB,, | Performed by: STUDENT IN AN ORGANIZED HEALTH CARE EDUCATION/TRAINING PROGRAM

## 2025-07-17 PROCEDURE — 1125F AMNT PAIN NOTED PAIN PRSNT: CPT | Mod: CPTII,S$GLB,, | Performed by: STUDENT IN AN ORGANIZED HEALTH CARE EDUCATION/TRAINING PROGRAM

## 2025-07-17 PROCEDURE — 3288F FALL RISK ASSESSMENT DOCD: CPT | Mod: CPTII,S$GLB,, | Performed by: STUDENT IN AN ORGANIZED HEALTH CARE EDUCATION/TRAINING PROGRAM

## 2025-07-17 PROCEDURE — 3066F NEPHROPATHY DOC TX: CPT | Mod: CPTII,S$GLB,, | Performed by: STUDENT IN AN ORGANIZED HEALTH CARE EDUCATION/TRAINING PROGRAM

## 2025-07-17 PROCEDURE — 3008F BODY MASS INDEX DOCD: CPT | Mod: CPTII,S$GLB,, | Performed by: STUDENT IN AN ORGANIZED HEALTH CARE EDUCATION/TRAINING PROGRAM

## 2025-07-17 PROCEDURE — 99999 PR PBB SHADOW E&M-EST. PATIENT-LVL III: CPT | Mod: PBBFAC,,, | Performed by: STUDENT IN AN ORGANIZED HEALTH CARE EDUCATION/TRAINING PROGRAM

## 2025-07-17 PROCEDURE — 1101F PT FALLS ASSESS-DOCD LE1/YR: CPT | Mod: CPTII,S$GLB,, | Performed by: STUDENT IN AN ORGANIZED HEALTH CARE EDUCATION/TRAINING PROGRAM

## 2025-07-17 PROCEDURE — 3061F NEG MICROALBUMINURIA REV: CPT | Mod: CPTII,S$GLB,, | Performed by: STUDENT IN AN ORGANIZED HEALTH CARE EDUCATION/TRAINING PROGRAM

## 2025-07-17 RX ORDER — DICLOFENAC SODIUM 75 MG/1
75 TABLET, DELAYED RELEASE ORAL 2 TIMES DAILY WITH MEALS
Qty: 60 TABLET | Refills: 1 | Status: SHIPPED | OUTPATIENT
Start: 2025-07-17

## 2025-07-24 ENCOUNTER — CLINICAL SUPPORT (OUTPATIENT)
Dept: REHABILITATION | Facility: HOSPITAL | Age: 70
End: 2025-07-24
Payer: MEDICARE

## 2025-07-24 DIAGNOSIS — Z96.641 S/P HIP REPLACEMENT, RIGHT: ICD-10-CM

## 2025-07-24 DIAGNOSIS — R29.898 WEAKNESS OF RIGHT HIP: ICD-10-CM

## 2025-07-24 DIAGNOSIS — M70.61 GREATER TROCHANTERIC BURSITIS OF RIGHT HIP: ICD-10-CM

## 2025-07-24 DIAGNOSIS — M25.651 DECREASED RANGE OF RIGHT HIP MOVEMENT: Primary | ICD-10-CM

## 2025-07-24 DIAGNOSIS — R26.89 IMPAIRED GAIT AND MOBILITY: ICD-10-CM

## 2025-07-24 PROCEDURE — 97161 PT EVAL LOW COMPLEX 20 MIN: CPT

## 2025-07-24 PROCEDURE — 97110 THERAPEUTIC EXERCISES: CPT

## 2025-07-24 NOTE — PROGRESS NOTES
Outpatient Rehab    Physical Therapy Evaluation    Patient Name: Carolee Bartlett  MRN: [id number]  YOB: 1955  Encounter Date: 7/24/2025    Therapy Diagnosis:   Encounter Diagnoses   Name Primary?    Greater trochanteric bursitis of right hip     S/P hip replacement, right     Decreased range of right hip movement Yes    Weakness of right hip     Impaired gait and mobility      Physician: Bill Avalos MD    Physician Orders: Eval and Treat  Medical Diagnosis: Greater trochanteric bursitis of right hip  S/P hip replacement, right  Surgical Diagnosis: Not applicable for this Episode   Surgical Date: Not applicable for this Episode  Days Since Last Surgery: Not applicable for this Episode    Visit # / Visits Authorized:  1 / 1  Insurance Authorization Period: 7/17/2025 to 7/17/2026  Date of Evaluation: 7/24/2025  Plan of Care Certification: 7/24/2025 to 9/26/2025     Time In: 1505   Time Out: 1600  Total Time (in minutes): 55   Total Billable Time (in minutes): 55    Intake Outcome Measure for FOTO Survey    Therapist reviewed FOTO scores for Carolee Bartlett on 7/24/2025.   FOTO report - see Media section or FOTO account episode details.     Intake Score (%): 49    Precautions:       Subjective   History of Present Illness  Carolee is a 70 y.o. female who reports to physical therapy with a chief concern of Right hip pain. She had a Right BILLY in 2018. She reports the hip pain returned about a year ago and has been progressively getting worse since. The pain is Right lateral hip and into her groin. She reports difficulty weight bearing / walking due to the pain.      Pain     Patient reports a current pain level of 7/10. Pain at best is reported as 5/10. Pain at worst is reported as 8/10.   Location: Right hip  Pain Qualities: Aching, Tenderness, Sharp  Pain-Aggravating Factors: Walking, Standing, Movement, Bending       Past Medical History/Physical Systems Review:   Carolee Bartlett  has a past  medical history of Avascular necrosis, Diabetes mellitus, Encounter for blood transfusion, Glaucoma, Graves disease, Hypertension, Other abnormal glucose, and Sciatica.    Carolee Bartlett  has a past surgical history that includes Tubal ligation; Hip Arthroplasty (Right, 11/26/2018); Phacoemulsification of cataract (Left, 09/08/2022); Intraocular prosthesis insertion (Left, 09/08/2022); Hip replacement arthroplasty (Right); Phacoemulsification of cataract (Right, 01/26/2023); Intraocular prosthesis insertion (Right, 01/26/2023); Colonoscopy (N/A, 03/27/2023); Cataract extraction w/  intraocular lens implant (Right, 01/26/2023); and Cataract extraction w/  intraocular lens implant (Left, 09/08/2022).    Carolee has a current medication list which includes the following prescription(s): albuterol, atorvastatin, diclofenac, diclofenac sodium, dorzolamide-timolol (pf), ferrous sulfate, latanoprost, levothyroxine, mometasone, miebo (pf), vista meibo tears, ozempic, and valsartan-hydrochlorothiazide.    Review of patient's allergies indicates:  No Known Allergies     Objective          Gait: antalgic, decreased stance time Right LE    Observation/Posture: standing: weight shifted onto Left LE, Right knee flexed     Hip Range of Motion: (measured in degrees)   Right  Left   Hip Extension NT NT   Hip External Rotation (hip flexed to 90) 40, end range p! 35   Hip Internal Rotation (hip flexed to 90) 35, p! 35   Hip Flexion 90, p! 100     Lower Extremity Strength  Right LE  Left LE  Goal   Knee extension: 4/5, p! Knee extension: 5/5 5/5   Knee flexion: 4/5 Knee flexion: 5/5 5/5   Hip flexion: 4-/5, p! Hip flexion: 4/5 5/5   Hip extension:  NT Hip extension: NT 5/5   Hip abduction: NT Hip abduction: NT 5/5   Hip IR: 4-/5 Hip IR: 4+/5 5/5   Hip ER: 3+/5 Hip ER: 4/5    Ankle dorsiflexion: 4/5 Ankle dorsiflexion: 5/5 5/5   Ankle plantarflexion: 4+/5 Ankle plantarflexion: 5/5 5/5       DTR:   Right Left Comment   Patellar (L3-4)  2+ 2+    Achilles (S1) 2+ 2+        Palpation: moderate TTP Right glute med, greater trochanter, proximal quad    Sensation: intact bilateral LE      Treatment:     HEP  instruction:    Supine hip abduction isometrics  Quad sets   SAQ  Standing weight shifts onto Right LE    Time Entry(in minutes):  PT Evaluation (Low) Time Entry: 45  Therapeutic Exercise Time Entry: 10    Assessment & Plan   Assessment  Carolee presents with a condition of Low complexity.           Functional Limitations: Decreased ambulation distance/endurance, Activity tolerance, Functional mobility, Gait limitations, Increased risk of fall, Standing tolerance, Pain with ADLs/IADLs, Painful locomotion/ambulation  Impairments: Abnormal gait, Abnormal or restricted range of motion, Impaired physical strength    Prognosis: Good  Assessment Details: Carolee is a 70 y.o. female referred to outpatient Physical Therapy with a medical diagnosis of Greater trochanteric bursitis of right hip, S/P hip replacement, right. Upon physical assessment, patient demonstrates: decreased Right hip range of motion, bilateral hip weakness, impaired gait, and impaired functional mobility. Patient will benefit from skilled outpatient Physical Therapy to address the deficits stated, provide patient/family education, and to maximize patient's quality of life.     Plan  From a physical therapy perspective, the patient would benefit from: Skilled Rehab Services    Planned therapy interventions include: Therapeutic exercise, Therapeutic activities, Neuromuscular re-education, Manual therapy, and Gait training.    Planned modalities to include: Biofeedback, Cryotherapy (cold pack), Electrical stimulation - attended, Electrical stimulation - passive/unattended, and Thermotherapy (hot pack).        Visit Frequency: 2 times Per Week for 8 Weeks.       This plan was discussed with Patient.   Discussion participants: Agreed Upon Plan of Care         The patient's spiritual, cultural,  and educational needs were considered, and the patient is agreeable to the plan of care and goals.         Goals:   Active       Long Term Goals       Long Term Goals       Start:  07/30/25    Expected End:  09/19/25       1.     Patient will demonstrate bilateral hip strength 4+/5.  2.     Pain Rating at Worst: 3/10 or less to improve overall Quality of Life.    3.     Patient will meet predicted functional outcome (FOTO) score: 60% functional ability or greater to increase self-perceived functional ability.  4.     Patient will be independent with updated HEP at discharge.               Short Term Goals       Short Term Goals       Start:  07/30/25    Expected End:  08/22/25       1.     Patient will be independent with HEP to supplement therapy in return to maximal function.  2.     Pain rating at Worst: 5/10 or less for improved tolerance with walking.  3.     Patient will demonstrate Right hip strength at least 4/5.                 Mary Beth Munson, PT

## 2025-07-29 PROBLEM — R29.898 WEAKNESS OF RIGHT HIP: Status: ACTIVE | Noted: 2025-07-29

## 2025-07-29 PROBLEM — R26.89 IMPAIRED GAIT AND MOBILITY: Status: ACTIVE | Noted: 2025-07-29

## 2025-07-29 PROBLEM — M25.651 DECREASED RANGE OF RIGHT HIP MOVEMENT: Status: ACTIVE | Noted: 2025-07-29

## 2025-07-29 PROBLEM — M25.651 DECREASED RANGE OF RIGHT HIP MOVEMENT: Status: RESOLVED | Noted: 2023-08-27 | Resolved: 2025-07-29

## 2025-07-30 ENCOUNTER — CLINICAL SUPPORT (OUTPATIENT)
Dept: REHABILITATION | Facility: HOSPITAL | Age: 70
End: 2025-07-30
Payer: MEDICARE

## 2025-07-30 DIAGNOSIS — R29.898 WEAKNESS OF RIGHT HIP: ICD-10-CM

## 2025-07-30 DIAGNOSIS — M25.651 DECREASED RANGE OF RIGHT HIP MOVEMENT: Primary | ICD-10-CM

## 2025-07-30 DIAGNOSIS — R26.89 IMPAIRED GAIT AND MOBILITY: ICD-10-CM

## 2025-07-30 PROCEDURE — 97110 THERAPEUTIC EXERCISES: CPT | Mod: CQ

## 2025-07-30 PROCEDURE — 97530 THERAPEUTIC ACTIVITIES: CPT | Mod: CQ

## 2025-07-30 NOTE — PROGRESS NOTES
Outpatient Rehab    Physical Therapy Visit    Patient Name: Carolee Bartlett  MRN: [id number]  YOB: 1955  Encounter Date: 7/30/2025    Therapy Diagnosis:   Encounter Diagnoses   Name Primary?    Decreased range of right hip movement Yes    Weakness of right hip     Impaired gait and mobility      Physician: Bill Avalos MD    Physician Orders: Eval and Treat  Medical Diagnosis: Greater trochanteric bursitis of right hip  S/P hip replacement, right  Surgical Diagnosis: Not applicable for this Episode   Surgical Date: Not applicable for this Episode  Days Since Last Surgery: Not applicable for this Episode    Visit # / Visits Authorized:  1 / 10  Insurance Authorization Period: 7/22/2025 to 12/31/2025  Date of Evaluation: 7/24/2025  Plan of Care Certification:       PT/PTA: PTA   Number of PTA visits since last PT visit:1  Time In: 1000   Time Out: 1100  Total Time (in minutes): 60   Total Billable Time (in minutes):   60    FOTO:  Intake Score (%): 49  Survey Score 2 (%): Not applicable for this Episode  Survey Score 3 (%): Not applicable for this Episode    Precautions:     Subjective   Patient reports discomfort throughout right hip upon entering treatment session today..  Pain reported as 4/10.      Objective    Objective Measures updated at progress report unless specified.      Treatment:   therapeutic exercises 38 minutes including:  Scifit 6 minutes    Bridges 2 x 10   Left Quad Sets 2 x 10 3 second hold each   Bilateral short arch quads 2 x 10 each   Bilateral straight leg raises 2 x 10 each (right limited 2* pain)  Bend knee fallout green thera band 2 x 10 each side   Long arch quads 2 x 10 each side 3 second hold each   Seated hip internal rotation green thera band 2 x 10 each side     manual therapy techniques: 00 minutes, including:    therapeutic activities 15 minutes, including:    Sit to stands 2 x 10   Standing hip abduction 2 x 10 each side    Standing hip extension 2 x 10 each side      Assessment & Plan   Assessment:   Patient able to complete therapeutic exercise today, however, patient with some discomfort throughout session. Patient with increased hip pain with right lower extremity straight leg raises, however, with limited range of motion throughout task patient able to complete task properly. Patient required verbal cues with standing hip abduction due to patient with external rotation of lower extremity throughout task, however, after verbal cues to lead with heel patient able to complete task properly.      The patient will continue to benefit from skilled outpatient physical therapy in order to address the deficits listed in the problem list on the initial evaluation, provide patient and family education, and maximize the patients level of independence in the home and community environments.     The patient's spiritual, cultural, and educational needs were considered, and the patient is agreeable to the plan of care and goals.       Plan:   Continue with Physical Therapist Plan of Care.     Goals: see caryl Poon, PTA

## 2025-08-05 ENCOUNTER — CLINICAL SUPPORT (OUTPATIENT)
Dept: REHABILITATION | Facility: HOSPITAL | Age: 70
End: 2025-08-05
Payer: MEDICARE

## 2025-08-05 ENCOUNTER — LAB VISIT (OUTPATIENT)
Dept: LAB | Facility: HOSPITAL | Age: 70
End: 2025-08-05
Attending: EMERGENCY MEDICINE
Payer: MEDICARE

## 2025-08-05 DIAGNOSIS — E78.5 HYPERLIPIDEMIA, UNSPECIFIED HYPERLIPIDEMIA TYPE: ICD-10-CM

## 2025-08-05 DIAGNOSIS — R29.898 WEAKNESS OF RIGHT HIP: ICD-10-CM

## 2025-08-05 DIAGNOSIS — R26.89 IMPAIRED GAIT AND MOBILITY: ICD-10-CM

## 2025-08-05 DIAGNOSIS — M25.651 DECREASED RANGE OF RIGHT HIP MOVEMENT: Primary | ICD-10-CM

## 2025-08-05 LAB
ALBUMIN SERPL BCP-MCNC: 4.1 G/DL (ref 3.5–5.2)
ALP SERPL-CCNC: 149 UNIT/L (ref 40–150)
ALT SERPL W/O P-5'-P-CCNC: 27 UNIT/L (ref 0–55)
ANION GAP (OHS): 8 MMOL/L (ref 8–16)
AST SERPL-CCNC: 21 UNIT/L (ref 0–50)
BILIRUB SERPL-MCNC: 0.5 MG/DL (ref 0.1–1)
BUN SERPL-MCNC: 14 MG/DL (ref 8–23)
CALCIUM SERPL-MCNC: 10.2 MG/DL (ref 8.7–10.5)
CHLORIDE SERPL-SCNC: 107 MMOL/L (ref 95–110)
CHOLEST SERPL-MCNC: 170 MG/DL (ref 120–199)
CHOLEST/HDLC SERPL: 3 {RATIO} (ref 2–5)
CO2 SERPL-SCNC: 25 MMOL/L (ref 23–29)
CREAT SERPL-MCNC: 0.9 MG/DL (ref 0.5–1.4)
GFR SERPLBLD CREATININE-BSD FMLA CKD-EPI: >60 ML/MIN/1.73/M2
GLUCOSE SERPL-MCNC: 98 MG/DL (ref 70–110)
HDLC SERPL-MCNC: 57 MG/DL (ref 40–75)
HDLC SERPL: 33.5 % (ref 20–50)
LDLC SERPL CALC-MCNC: 94 MG/DL (ref 63–159)
NONHDLC SERPL-MCNC: 113 MG/DL
POTASSIUM SERPL-SCNC: 4.5 MMOL/L (ref 3.5–5.1)
PROT SERPL-MCNC: 8.1 GM/DL (ref 6–8.4)
SODIUM SERPL-SCNC: 140 MMOL/L (ref 136–145)
TRIGL SERPL-MCNC: 95 MG/DL (ref 30–150)

## 2025-08-05 PROCEDURE — 82247 BILIRUBIN TOTAL: CPT

## 2025-08-05 PROCEDURE — 82310 ASSAY OF CALCIUM: CPT

## 2025-08-05 PROCEDURE — 36415 COLL VENOUS BLD VENIPUNCTURE: CPT

## 2025-08-05 PROCEDURE — 97140 MANUAL THERAPY 1/> REGIONS: CPT

## 2025-08-05 PROCEDURE — 83718 ASSAY OF LIPOPROTEIN: CPT

## 2025-08-05 PROCEDURE — 97110 THERAPEUTIC EXERCISES: CPT

## 2025-08-05 NOTE — PROGRESS NOTES
Outpatient Rehab    Physical Therapy Visit    Patient Name: Carolee Bartlett  MRN: [id number]  YOB: 1955  Encounter Date: 8/5/2025    Therapy Diagnosis:   Encounter Diagnoses   Name Primary?    Decreased range of right hip movement Yes    Weakness of right hip     Impaired gait and mobility      Physician: Bill Avalos MD    Physician Orders: Eval and Treat  Medical Diagnosis: Greater trochanteric bursitis of right hip  S/P hip replacement, right  Surgical Diagnosis: Not applicable for this Episode   Surgical Date: Not applicable for this Episode  Days Since Last Surgery: Not applicable for this Episode    Visit # / Visits Authorized: 2 / 10  Insurance Authorization Period: 7/22/2025 to 12/31/2025  Date of Evaluation: 7/24/2025  Plan of Care Certification: 7/24/2025 to 9/19/2025      PT/PTA:     Number of PTA visits since last PT visit:   Time In: 1005   Time Out: 1100  Total Time (in minutes): 55   Total Billable Time (in minutes): 55     FOTO:  Intake Score (%): 49  Survey Score 2 (%): Not applicable for this Episode  Survey Score 3 (%): Not applicable for this Episode    Precautions:     Subjective   Patient reports hip pain is about the same today..  Pain reported as 5/10.      Objective    Objective Measures updated at progress report unless specified.      Treatment:     therapeutic exercises 40 minutes including:    Scifit 6 minutes    Quad sets R LE 10 sec hold x 10  SAQ R LE 2# AW 2 x 10  Bridges 2 x 10   Sidelying clamshells R LE only (pillow between knees) 2 x 10  Long arch quad R LE no weight 3 x 8  Seated hip internal rotation green thera band 2 x 10 each side   Standing TKE R LE green TB 5 sec hold 2 x 10    Not performed:  Bilateral straight leg raises 2 x 10 each (right limited 2* pain)  Bend knee fallout green thera band 2 x 10 each side     manual therapy techniques: 10 minutes, including:    Right hip long axis distraction  Right hip inferior and lateral mobs with mobilization  renny, grade II-III  Right hip PROM     therapeutic activities 5 minutes, including:    Sit to stands with Air Ex on chair 2 x 10     Not performed:  Standing hip abduction 2 x 10 each side    Standing hip extension 2 x 10 each side     Assessment & Plan   Assessment:   Patient responded well to Right hip joint mobs and PROM with improved hip flexion demonstrated following. Therapeutic exercise continued to focus on Right quad and hip strengthening which she tolerated well. Progressed SAQ by adding 2 lb ankle weight; added sidelying clamshells and standing TKE today. Will continue to progress treatment as tolerated.      The patient will continue to benefit from skilled outpatient physical therapy in order to address the deficits listed in the problem list on the initial evaluation, provide patient and family education, and maximize the patients level of independence in the home and community environments.     The patient's spiritual, cultural, and educational needs were considered, and the patient is agreeable to the plan of care and goals.       Plan:   Continue with Physical Therapist Plan of Care.     Goals:   Active       Long Term Goals       Long Term Goals       Start:  07/30/25    Expected End:  09/19/25       1.     Patient will demonstrate bilateral hip strength 4+/5.  2.     Pain Rating at Worst: 3/10 or less to improve overall Quality of Life.    3.     Patient will meet predicted functional outcome (FOTO) score: 60% functional ability or greater to increase self-perceived functional ability.  4.     Patient will be independent with updated HEP at discharge.               Short Term Goals       Short Term Goals       Start:  07/30/25    Expected End:  08/22/25       1.     Patient will be independent with HEP to supplement therapy in return to maximal function.  2.     Pain rating at Worst: 5/10 or less for improved tolerance with walking.  3.     Patient will demonstrate Right hip strength at least  4/5.                 Mary Beth Munson, PT

## 2025-08-07 ENCOUNTER — CLINICAL SUPPORT (OUTPATIENT)
Dept: REHABILITATION | Facility: HOSPITAL | Age: 70
End: 2025-08-07
Payer: MEDICARE

## 2025-08-07 DIAGNOSIS — R29.898 WEAKNESS OF RIGHT HIP: ICD-10-CM

## 2025-08-07 DIAGNOSIS — M25.651 DECREASED RANGE OF RIGHT HIP MOVEMENT: Primary | ICD-10-CM

## 2025-08-07 DIAGNOSIS — R26.89 IMPAIRED GAIT AND MOBILITY: ICD-10-CM

## 2025-08-07 PROCEDURE — 97140 MANUAL THERAPY 1/> REGIONS: CPT | Mod: CQ

## 2025-08-07 PROCEDURE — 97110 THERAPEUTIC EXERCISES: CPT | Mod: CQ

## 2025-08-07 NOTE — PROGRESS NOTES
Outpatient Rehab    Physical Therapy Visit    Patient Name: Carolee Bartlett  MRN: [id number]  YOB: 1955  Encounter Date: 8/7/2025    Therapy Diagnosis:   Encounter Diagnoses   Name Primary?    Decreased range of right hip movement Yes    Weakness of right hip     Impaired gait and mobility      Physician: Bill Avalos MD    Physician Orders: Eval and Treat  Medical Diagnosis: Greater trochanteric bursitis of right hip  S/P hip replacement, right  Surgical Diagnosis: Not applicable for this Episode   Surgical Date: Not applicable for this Episode  Days Since Last Surgery: Not applicable for this Episode    Visit # / Visits Authorized:  3 / 10  Insurance Authorization Period: 7/22/2025 to 12/31/2025  Date of Evaluation: 7/24/2025  Plan of Care Certification: 7/24/2025 to 9/19/2025      PT/PTA: PTA   Number of PTA visits since last PT visit:1  Time In: 1000   Time Out: 1100  Total Time (in minutes): 60   Total Billable Time (in minutes):   60    FOTO:  Intake Score (%): 49  Survey Score 2 (%): Not applicable for this Episode  Survey Score 3 (%): Not applicable for this Episode    Precautions:     Subjective   Patient reports feeling better, however, patient still with some discomfort throughout lower extermity..  Pain reported as 3/10.      Objective    Objective Measures updated at progress report unless specified.      Treatment:   therapeutic exercises 40 minutes including:     Scifit 6 minutes             Quad sets R LE 10 sec hold x 10  SAQ R LE 2# AW 2 x 10  Bridges 2 x 10   Sidelying clamshells R LE only (pillow between knees) 2 x 10  Long arch quad R LE no weight 3 x 8  Seated hip internal rotation green thera band 2 x 10 each side   Standing TKE R LE green TB 5 sec hold 2 x 10     Not performed:  Bilateral straight leg raises 2 x 10 each (right limited 2* pain)  Bend knee fallout green thera band 2 x 10 each side      manual therapy techniques: 10 minutes, including:     Right hip long axis  distraction  Right hip inferior and lateral mobs with mobilization belt, grade II-III  Right hip PROM      therapeutic activities 5 minutes, including:     Sit to stands with Air Ex on chair 2 x 10      Not performed:  Standing hip abduction 2 x 10 each side         Standing hip extension 2 x 10 each side     Assessment & Plan   Assessment:   Patient with some tightness throughout right hip throughout manual stretching, however, patient without reproduced discomfort throughout treatment. Patient with increased fatigue throughout sit to stands, however, patient with brief rest break to complete full reps.      The patient will continue to benefit from skilled outpatient physical therapy in order to address the deficits listed in the problem list on the initial evaluation, provide patient and family education, and maximize the patients level of independence in the home and community environments.     The patient's spiritual, cultural, and educational needs were considered, and the patient is agreeable to the plan of care and goals.       Plan:   Continue with Physical Therapist Plan of Care.     Goals:   Active       Long Term Goals       Long Term Goals       Start:  07/30/25    Expected End:  09/19/25       1.     Patient will demonstrate bilateral hip strength 4+/5.  2.     Pain Rating at Worst: 3/10 or less to improve overall Quality of Life.    3.     Patient will meet predicted functional outcome (FOTO) score: 60% functional ability or greater to increase self-perceived functional ability.  4.     Patient will be independent with updated HEP at discharge.               Short Term Goals       Short Term Goals       Start:  07/30/25    Expected End:  08/22/25       1.     Patient will be independent with HEP to supplement therapy in return to maximal function.  2.     Pain rating at Worst: 5/10 or less for improved tolerance with walking.  3.     Patient will demonstrate Right hip strength at least 4/5.                  Stu Poon, PTA

## 2025-08-13 ENCOUNTER — OFFICE VISIT (OUTPATIENT)
Dept: SPORTS MEDICINE | Facility: CLINIC | Age: 70
End: 2025-08-13
Payer: MEDICARE

## 2025-08-13 ENCOUNTER — CLINICAL SUPPORT (OUTPATIENT)
Dept: REHABILITATION | Facility: HOSPITAL | Age: 70
End: 2025-08-13
Payer: MEDICARE

## 2025-08-13 ENCOUNTER — OFFICE VISIT (OUTPATIENT)
Facility: CLINIC | Age: 70
End: 2025-08-13
Payer: MEDICARE

## 2025-08-13 ENCOUNTER — HOSPITAL ENCOUNTER (OUTPATIENT)
Dept: RADIOLOGY | Facility: HOSPITAL | Age: 70
Discharge: HOME | End: 2025-08-13
Payer: MEDICARE

## 2025-08-13 VITALS
HEART RATE: 71 BPM | SYSTOLIC BLOOD PRESSURE: 101 MMHG | WEIGHT: 213.88 LBS | HEIGHT: 63 IN | BODY MASS INDEX: 37.89 KG/M2 | DIASTOLIC BLOOD PRESSURE: 57 MMHG

## 2025-08-13 DIAGNOSIS — M25.651 DECREASED RANGE OF RIGHT HIP MOVEMENT: Primary | ICD-10-CM

## 2025-08-13 DIAGNOSIS — G89.29 CHRONIC RIGHT HIP PAIN: Primary | ICD-10-CM

## 2025-08-13 DIAGNOSIS — M25.551 CHRONIC RIGHT HIP PAIN: Primary | ICD-10-CM

## 2025-08-13 DIAGNOSIS — R26.89 IMPAIRED GAIT AND MOBILITY: ICD-10-CM

## 2025-08-13 DIAGNOSIS — M25.551 GREATER TROCHANTERIC PAIN SYNDROME OF RIGHT LOWER EXTREMITY: ICD-10-CM

## 2025-08-13 DIAGNOSIS — M79.671 RIGHT FOOT PAIN: ICD-10-CM

## 2025-08-13 DIAGNOSIS — S92.514A CLOSED NONDISPLACED FRACTURE OF PROXIMAL PHALANX OF LESSER TOE OF RIGHT FOOT, INITIAL ENCOUNTER: Primary | ICD-10-CM

## 2025-08-13 DIAGNOSIS — S92.501A CLOSED FRACTURE OF PHALANX OF RIGHT FOURTH TOE, INITIAL ENCOUNTER: ICD-10-CM

## 2025-08-13 DIAGNOSIS — Z96.641 S/P HIP REPLACEMENT, RIGHT: ICD-10-CM

## 2025-08-13 DIAGNOSIS — R29.898 WEAKNESS OF RIGHT HIP: ICD-10-CM

## 2025-08-13 PROCEDURE — 3066F NEPHROPATHY DOC TX: CPT | Mod: CPTII,S$GLB,, | Performed by: ORTHOPAEDIC SURGERY

## 2025-08-13 PROCEDURE — 1159F MED LIST DOCD IN RCRD: CPT | Mod: CPTII,S$GLB,, | Performed by: ORTHOPAEDIC SURGERY

## 2025-08-13 PROCEDURE — 76882 US LMTD JT/FCL EVL NVASC XTR: CPT | Mod: S$GLB,,, | Performed by: ORTHOPAEDIC SURGERY

## 2025-08-13 PROCEDURE — 3078F DIAST BP <80 MM HG: CPT | Mod: CPTII,S$GLB,, | Performed by: ORTHOPAEDIC SURGERY

## 2025-08-13 PROCEDURE — 73630 X-RAY EXAM OF FOOT: CPT | Mod: TC,RT

## 2025-08-13 PROCEDURE — 1125F AMNT PAIN NOTED PAIN PRSNT: CPT | Mod: CPTII,S$GLB,, | Performed by: ORTHOPAEDIC SURGERY

## 2025-08-13 PROCEDURE — 99214 OFFICE O/P EST MOD 30 MIN: CPT | Mod: 25,S$GLB,, | Performed by: ORTHOPAEDIC SURGERY

## 2025-08-13 PROCEDURE — 3074F SYST BP LT 130 MM HG: CPT | Mod: CPTII,S$GLB,, | Performed by: ORTHOPAEDIC SURGERY

## 2025-08-13 PROCEDURE — 1160F RVW MEDS BY RX/DR IN RCRD: CPT | Mod: CPTII,S$GLB,, | Performed by: ORTHOPAEDIC SURGERY

## 2025-08-13 PROCEDURE — 99999 PR PBB SHADOW E&M-EST. PATIENT-LVL III: CPT | Mod: PBBFAC,,, | Performed by: ORTHOPAEDIC SURGERY

## 2025-08-13 PROCEDURE — 3288F FALL RISK ASSESSMENT DOCD: CPT | Mod: CPTII,S$GLB,, | Performed by: ORTHOPAEDIC SURGERY

## 2025-08-13 PROCEDURE — 73630 X-RAY EXAM OF FOOT: CPT | Mod: 26,RT,, | Performed by: RADIOLOGY

## 2025-08-13 PROCEDURE — 1101F PT FALLS ASSESS-DOCD LE1/YR: CPT | Mod: CPTII,S$GLB,, | Performed by: ORTHOPAEDIC SURGERY

## 2025-08-13 PROCEDURE — 3061F NEG MICROALBUMINURIA REV: CPT | Mod: CPTII,S$GLB,, | Performed by: ORTHOPAEDIC SURGERY

## 2025-08-13 PROCEDURE — 99999 PR PBB SHADOW E&M-EST. PATIENT-LVL III: CPT | Mod: PBBFAC,,,

## 2025-08-13 PROCEDURE — 97110 THERAPEUTIC EXERCISES: CPT | Mod: CQ

## 2025-08-13 PROCEDURE — 3051F HG A1C>EQUAL 7.0%<8.0%: CPT | Mod: CPTII,S$GLB,, | Performed by: ORTHOPAEDIC SURGERY

## 2025-08-13 PROCEDURE — 3008F BODY MASS INDEX DOCD: CPT | Mod: CPTII,S$GLB,, | Performed by: ORTHOPAEDIC SURGERY

## 2025-08-13 RX ORDER — MELOXICAM 7.5 MG/1
7.5 TABLET ORAL DAILY
Qty: 30 TABLET | Refills: 0 | Status: SHIPPED | OUTPATIENT
Start: 2025-08-13

## 2025-08-13 RX ORDER — OXYCODONE HYDROCHLORIDE 5 MG/1
TABLET ORAL
Qty: 28 TABLET | Refills: 0 | Status: SHIPPED | OUTPATIENT
Start: 2025-08-13

## 2025-08-13 RX ORDER — MELOXICAM 7.5 MG/1
7.5 TABLET ORAL DAILY
Qty: 30 TABLET | Refills: 0 | Status: CANCELLED | OUTPATIENT
Start: 2025-08-13

## 2025-08-19 ENCOUNTER — CLINICAL SUPPORT (OUTPATIENT)
Dept: REHABILITATION | Facility: HOSPITAL | Age: 70
End: 2025-08-19
Payer: MEDICARE

## 2025-08-19 DIAGNOSIS — R29.898 WEAKNESS OF RIGHT HIP: ICD-10-CM

## 2025-08-19 DIAGNOSIS — R26.89 IMPAIRED GAIT AND MOBILITY: ICD-10-CM

## 2025-08-19 DIAGNOSIS — M25.651 DECREASED RANGE OF RIGHT HIP MOVEMENT: Primary | ICD-10-CM

## 2025-08-19 PROCEDURE — 97140 MANUAL THERAPY 1/> REGIONS: CPT | Mod: CQ

## 2025-08-19 PROCEDURE — 97110 THERAPEUTIC EXERCISES: CPT | Mod: CQ

## 2025-08-20 ENCOUNTER — OFFICE VISIT (OUTPATIENT)
Dept: ORTHOPEDICS | Facility: CLINIC | Age: 70
End: 2025-08-20
Payer: MEDICARE

## 2025-08-20 ENCOUNTER — HOSPITAL ENCOUNTER (OUTPATIENT)
Dept: RADIOLOGY | Facility: HOSPITAL | Age: 70
Discharge: HOME | End: 2025-08-20
Attending: PHYSICIAN ASSISTANT
Payer: MEDICARE

## 2025-08-20 VITALS — HEIGHT: 63 IN | WEIGHT: 213.88 LBS | BODY MASS INDEX: 37.89 KG/M2

## 2025-08-20 DIAGNOSIS — S92.514A CLOSED NONDISPLACED FRACTURE OF PROXIMAL PHALANX OF LESSER TOE OF RIGHT FOOT, INITIAL ENCOUNTER: ICD-10-CM

## 2025-08-20 DIAGNOSIS — S92.514A CLOSED NONDISPLACED FRACTURE OF PROXIMAL PHALANX OF LESSER TOE OF RIGHT FOOT, INITIAL ENCOUNTER: Primary | ICD-10-CM

## 2025-08-20 PROCEDURE — 1125F AMNT PAIN NOTED PAIN PRSNT: CPT | Mod: CPTII,S$GLB,, | Performed by: PHYSICIAN ASSISTANT

## 2025-08-20 PROCEDURE — 99999 PR PBB SHADOW E&M-EST. PATIENT-LVL II: CPT | Mod: PBBFAC,,, | Performed by: PHYSICIAN ASSISTANT

## 2025-08-20 PROCEDURE — 3008F BODY MASS INDEX DOCD: CPT | Mod: CPTII,S$GLB,, | Performed by: PHYSICIAN ASSISTANT

## 2025-08-20 PROCEDURE — 73630 X-RAY EXAM OF FOOT: CPT | Mod: TC,RT

## 2025-08-20 PROCEDURE — 99213 OFFICE O/P EST LOW 20 MIN: CPT | Mod: S$GLB,,, | Performed by: PHYSICIAN ASSISTANT

## 2025-08-20 PROCEDURE — 3061F NEG MICROALBUMINURIA REV: CPT | Mod: CPTII,S$GLB,, | Performed by: PHYSICIAN ASSISTANT

## 2025-08-20 PROCEDURE — 3066F NEPHROPATHY DOC TX: CPT | Mod: CPTII,S$GLB,, | Performed by: PHYSICIAN ASSISTANT

## 2025-08-20 PROCEDURE — 73630 X-RAY EXAM OF FOOT: CPT | Mod: 26,RT,, | Performed by: RADIOLOGY

## 2025-08-20 PROCEDURE — 3051F HG A1C>EQUAL 7.0%<8.0%: CPT | Mod: CPTII,S$GLB,, | Performed by: PHYSICIAN ASSISTANT

## 2025-08-21 ENCOUNTER — TELEPHONE (OUTPATIENT)
Dept: ORTHOPEDICS | Facility: CLINIC | Age: 70
End: 2025-08-21
Payer: MEDICARE

## 2025-08-27 ENCOUNTER — OFFICE VISIT (OUTPATIENT)
Dept: ORTHOPEDICS | Facility: CLINIC | Age: 70
End: 2025-08-27
Payer: MEDICARE

## 2025-08-27 ENCOUNTER — HOSPITAL ENCOUNTER (OUTPATIENT)
Dept: RADIOLOGY | Facility: HOSPITAL | Age: 70
Discharge: HOME | End: 2025-08-27
Attending: PHYSICIAN ASSISTANT
Payer: MEDICARE

## 2025-08-27 VITALS — BODY MASS INDEX: 37.89 KG/M2 | WEIGHT: 213.88 LBS | HEIGHT: 63 IN

## 2025-08-27 DIAGNOSIS — S92.514A CLOSED NONDISPLACED FRACTURE OF PROXIMAL PHALANX OF LESSER TOE OF RIGHT FOOT, INITIAL ENCOUNTER: Primary | ICD-10-CM

## 2025-08-27 DIAGNOSIS — S92.514A CLOSED NONDISPLACED FRACTURE OF PROXIMAL PHALANX OF LESSER TOE OF RIGHT FOOT, INITIAL ENCOUNTER: ICD-10-CM

## 2025-08-27 PROCEDURE — 1125F AMNT PAIN NOTED PAIN PRSNT: CPT | Mod: CPTII,S$GLB,, | Performed by: PHYSICIAN ASSISTANT

## 2025-08-27 PROCEDURE — 73630 X-RAY EXAM OF FOOT: CPT | Mod: 26,RT,, | Performed by: RADIOLOGY

## 2025-08-27 PROCEDURE — 3061F NEG MICROALBUMINURIA REV: CPT | Mod: CPTII,S$GLB,, | Performed by: PHYSICIAN ASSISTANT

## 2025-08-27 PROCEDURE — 3008F BODY MASS INDEX DOCD: CPT | Mod: CPTII,S$GLB,, | Performed by: PHYSICIAN ASSISTANT

## 2025-08-27 PROCEDURE — 1159F MED LIST DOCD IN RCRD: CPT | Mod: CPTII,S$GLB,, | Performed by: PHYSICIAN ASSISTANT

## 2025-08-27 PROCEDURE — 99213 OFFICE O/P EST LOW 20 MIN: CPT | Mod: S$GLB,,, | Performed by: PHYSICIAN ASSISTANT

## 2025-08-27 PROCEDURE — 99999 PR PBB SHADOW E&M-EST. PATIENT-LVL III: CPT | Mod: PBBFAC,,, | Performed by: PHYSICIAN ASSISTANT

## 2025-08-27 PROCEDURE — 73630 X-RAY EXAM OF FOOT: CPT | Mod: TC,RT

## 2025-08-27 PROCEDURE — 3066F NEPHROPATHY DOC TX: CPT | Mod: CPTII,S$GLB,, | Performed by: PHYSICIAN ASSISTANT

## 2025-08-27 PROCEDURE — 3051F HG A1C>EQUAL 7.0%<8.0%: CPT | Mod: CPTII,S$GLB,, | Performed by: PHYSICIAN ASSISTANT

## 2025-09-04 ENCOUNTER — OFFICE VISIT (OUTPATIENT)
Dept: OPHTHALMOLOGY | Facility: CLINIC | Age: 70
End: 2025-09-04
Payer: MEDICARE

## 2025-09-04 DIAGNOSIS — H02.403 PTOSIS, BILATERAL: ICD-10-CM

## 2025-09-04 DIAGNOSIS — H04.123 DRY EYE SYNDROME OF BOTH LACRIMAL GLANDS: ICD-10-CM

## 2025-09-04 DIAGNOSIS — Z96.1 PSEUDOPHAKIA, BOTH EYES: ICD-10-CM

## 2025-09-04 DIAGNOSIS — H40.53X3 SEVERE STAGE SECONDARY GLAUCOMA OF BOTH EYES DUE TO COMBINATION MECHANISMS: Primary | ICD-10-CM

## 2025-09-04 DIAGNOSIS — G51.31 HEMIFACIAL SPASM OF RIGHT SIDE OF FACE: ICD-10-CM

## 2025-09-04 PROCEDURE — 1126F AMNT PAIN NOTED NONE PRSNT: CPT | Mod: CPTII,S$GLB,, | Performed by: STUDENT IN AN ORGANIZED HEALTH CARE EDUCATION/TRAINING PROGRAM

## 2025-09-04 PROCEDURE — 99999 PR PBB SHADOW E&M-EST. PATIENT-LVL III: CPT | Mod: PBBFAC,,, | Performed by: STUDENT IN AN ORGANIZED HEALTH CARE EDUCATION/TRAINING PROGRAM

## 2025-09-04 PROCEDURE — 1159F MED LIST DOCD IN RCRD: CPT | Mod: CPTII,S$GLB,, | Performed by: STUDENT IN AN ORGANIZED HEALTH CARE EDUCATION/TRAINING PROGRAM

## 2025-09-04 PROCEDURE — 3061F NEG MICROALBUMINURIA REV: CPT | Mod: CPTII,S$GLB,, | Performed by: STUDENT IN AN ORGANIZED HEALTH CARE EDUCATION/TRAINING PROGRAM

## 2025-09-04 PROCEDURE — 99214 OFFICE O/P EST MOD 30 MIN: CPT | Mod: S$GLB,,, | Performed by: STUDENT IN AN ORGANIZED HEALTH CARE EDUCATION/TRAINING PROGRAM

## 2025-09-04 PROCEDURE — 3066F NEPHROPATHY DOC TX: CPT | Mod: CPTII,S$GLB,, | Performed by: STUDENT IN AN ORGANIZED HEALTH CARE EDUCATION/TRAINING PROGRAM

## 2025-09-04 PROCEDURE — 3288F FALL RISK ASSESSMENT DOCD: CPT | Mod: CPTII,S$GLB,, | Performed by: STUDENT IN AN ORGANIZED HEALTH CARE EDUCATION/TRAINING PROGRAM

## 2025-09-04 PROCEDURE — 1101F PT FALLS ASSESS-DOCD LE1/YR: CPT | Mod: CPTII,S$GLB,, | Performed by: STUDENT IN AN ORGANIZED HEALTH CARE EDUCATION/TRAINING PROGRAM

## 2025-09-04 PROCEDURE — 92133 CPTRZD OPH DX IMG PST SGM ON: CPT | Mod: S$GLB,,, | Performed by: STUDENT IN AN ORGANIZED HEALTH CARE EDUCATION/TRAINING PROGRAM

## 2025-09-04 PROCEDURE — 3051F HG A1C>EQUAL 7.0%<8.0%: CPT | Mod: CPTII,S$GLB,, | Performed by: STUDENT IN AN ORGANIZED HEALTH CARE EDUCATION/TRAINING PROGRAM

## 2025-09-04 PROCEDURE — G2211 COMPLEX E/M VISIT ADD ON: HCPCS | Mod: S$GLB,,, | Performed by: STUDENT IN AN ORGANIZED HEALTH CARE EDUCATION/TRAINING PROGRAM

## 2025-09-05 ENCOUNTER — TELEPHONE (OUTPATIENT)
Dept: SPORTS MEDICINE | Facility: CLINIC | Age: 70
End: 2025-09-05
Payer: MEDICARE

## 2025-09-05 ENCOUNTER — OFFICE VISIT (OUTPATIENT)
Dept: SPORTS MEDICINE | Facility: CLINIC | Age: 70
End: 2025-09-05
Payer: MEDICARE

## 2025-09-05 VITALS
WEIGHT: 209.75 LBS | DIASTOLIC BLOOD PRESSURE: 68 MMHG | HEART RATE: 86 BPM | SYSTOLIC BLOOD PRESSURE: 105 MMHG | HEIGHT: 63 IN | BODY MASS INDEX: 37.16 KG/M2

## 2025-09-05 DIAGNOSIS — G89.29 CHRONIC RIGHT HIP PAIN: Primary | ICD-10-CM

## 2025-09-05 DIAGNOSIS — M25.551 GREATER TROCHANTERIC PAIN SYNDROME OF RIGHT LOWER EXTREMITY: ICD-10-CM

## 2025-09-05 DIAGNOSIS — Z96.641 S/P HIP REPLACEMENT, RIGHT: ICD-10-CM

## 2025-09-05 DIAGNOSIS — M25.551 CHRONIC RIGHT HIP PAIN: Primary | ICD-10-CM

## 2025-09-05 PROCEDURE — 99999 PR PBB SHADOW E&M-EST. PATIENT-LVL III: CPT | Mod: PBBFAC,,, | Performed by: ORTHOPAEDIC SURGERY

## 2025-09-05 RX ORDER — TRIAMCINOLONE ACETONIDE 40 MG/ML
12 INJECTION, SUSPENSION INTRA-ARTICULAR; INTRAMUSCULAR
Status: COMPLETED | OUTPATIENT
Start: 2025-09-05 | End: 2025-09-05

## 2025-09-05 RX ADMIN — TRIAMCINOLONE ACETONIDE 12 MG: 40 INJECTION, SUSPENSION INTRA-ARTICULAR; INTRAMUSCULAR at 03:09

## (undated) DEVICE — SHIELD COLLAGEN 12HR CORNEAL

## (undated) DEVICE — NDL FLTR 5MCRN BLNT TIP 18GX1

## (undated) DEVICE — SHIELD EYE CLEAR ACRYLIC UNIV

## (undated) DEVICE — GOWN SURGICAL X-LARGE

## (undated) DEVICE — DRAPE STERI INCISE MED 130X130

## (undated) DEVICE — KIT GREY EYE

## (undated) DEVICE — PAD EYE OVAL CNTOUR 1.62X2.62

## (undated) DEVICE — DRAPE THREE-QTR REINF 53X77IN

## (undated) DEVICE — SOL BETADINE 5%

## (undated) DEVICE — SOL WATER STRL IRR 1000ML